# Patient Record
Sex: FEMALE | Race: WHITE | Employment: OTHER | URBAN - METROPOLITAN AREA
[De-identification: names, ages, dates, MRNs, and addresses within clinical notes are randomized per-mention and may not be internally consistent; named-entity substitution may affect disease eponyms.]

---

## 2017-05-03 ENCOUNTER — HOSPITAL ENCOUNTER (OUTPATIENT)
Dept: PHYSICAL THERAPY | Age: 67
Discharge: HOME | End: 2017-05-31
Attending: INTERNAL MEDICINE | Admitting: INTERNAL MEDICINE

## 2017-06-01 ENCOUNTER — HOSPITAL ENCOUNTER (OUTPATIENT)
Dept: OTHER | Age: 67
Discharge: HOME | End: 2017-06-30
Attending: INTERNAL MEDICINE | Admitting: INTERNAL MEDICINE

## 2017-10-03 NOTE — ANESTHESIA PRE-OP
Department of Anesthesiology  Preprocedure Note       Name:  Yang Marie   Age:  78 y.o.  :  1950                                          MRN:  [id number]         Date:  10/2/2017      Surgeon:    Procedure: colonoscopy    Medications prior to admission:   Prior to Admission medications    Medication Sig Start Date End Date Taking? Authorizing Provider   calcium-vitamin D (OSCAL-500) 500-200 MG-UNIT per tablet Take 1 tablet by mouth daily    Historical Provider, MD   methylcellulose (CITRUCEL) 500 MG TABS Take by mouth 2 times daily    Historical Provider, MD   Multiple Vitamin (MULTI VITAMIN DAILY PO) Take by mouth    Historical Provider, MD   vitamin E 400 UNIT capsule Take 400 Units by mouth daily    Historical Provider, MD   betamethasone valerate (VALISONE) 0.1 % cream APPLY THIN APPLICATION TO AFFECTED AREA BID PRN FOR DERMATITIS 17   Historical Provider, MD   hydrochlorothiazide (HYDRODIURIL) 25 MG tablet daily  16   Historical Provider, MD   nadolol (CORGARD) 80 MG tablet daily  16   Historical Provider, MD   letrozole (FEMARA) 2.5 MG tablet Take 2.5 mg by mouth daily    Historical Provider, MD   aspirin 81 MG tablet Take 81 mg by mouth daily    Historical Provider, MD   Loratadine 10 MG CAPS Take by mouth    Historical Provider, MD   traMADol (ULTRAM) 50 MG tablet Take 50 mg by mouth every 6 hours as needed for Pain.     Historical Provider, MD       Current medications:    Current Outpatient Prescriptions   Medication Sig Dispense Refill    calcium-vitamin D (OSCAL-500) 500-200 MG-UNIT per tablet Take 1 tablet by mouth daily      methylcellulose (CITRUCEL) 500 MG TABS Take by mouth 2 times daily      Multiple Vitamin (MULTI VITAMIN DAILY PO) Take by mouth      vitamin E 400 UNIT capsule Take 400 Units by mouth daily      betamethasone valerate (VALISONE) 0.1 % cream APPLY THIN APPLICATION TO AFFECTED AREA BID PRN FOR DERMATITIS  1    hydrochlorothiazide (HYDRODIURIL) 25 MG TIVA     intravenous induction       Pre Anesthesia Assessment complete. Chart reviewed on 10/2/2017. This is a chart review only. Rickey Guerra,    10/2/2017      =================  Addendum =================  Patient records were reviewed and the patient seen and evaluated. Most Recent Results:  BP (!) 173/86  Pulse 84  Temp 98 °F (36.7 °C) (Temporal)   Resp 16  Ht 5' 2\" (1.575 m)  Wt 156 lb (70.8 kg)  SpO2 98%  BMI 28.53 kg/m2    Lab Results   Component Value Date/Time     03/15/2016 09:25 AM    K 3.8 03/15/2016 09:25 AM     03/15/2016 09:25 AM    CO2 27 03/15/2016 09:25 AM    BUN 11 03/15/2016 09:25 AM    CREATININE 0.5 (L) 03/15/2016 09:25 AM     Anesthesiology focused physical examination:  MP2  RRR without murmur  Lungs clear    Assessment/Plan:  NPO status confirmed  beta-blocker - took nadolol @ 0600 this AM  Plan MAC/TIVA    PS 2    Anesthesia procedures discussed - all questions answered.   Information/plan discussed with CRNA involved in the anesthesia care team.    Gaby Landaverde MD

## 2017-10-04 ENCOUNTER — HOSPITAL ENCOUNTER (OUTPATIENT)
Dept: SURGERY | Age: 67
Discharge: HOME | End: 2017-10-04
Attending: SPECIALIST | Admitting: SPECIALIST

## 2017-10-04 VITALS
HEART RATE: 79 BPM | RESPIRATION RATE: 16 BRPM | BODY MASS INDEX: 28.71 KG/M2 | TEMPERATURE: 98.2 F | WEIGHT: 156 LBS | SYSTOLIC BLOOD PRESSURE: 156 MMHG | HEIGHT: 62 IN | DIASTOLIC BLOOD PRESSURE: 87 MMHG | OXYGEN SATURATION: 99 %

## 2017-10-04 RX ORDER — SODIUM CHLORIDE, SODIUM LACTATE, POTASSIUM CHLORIDE, CALCIUM CHLORIDE 600; 310; 30; 20 MG/100ML; MG/100ML; MG/100ML; MG/100ML
INJECTION, SOLUTION INTRAVENOUS CONTINUOUS
Status: DISCONTINUED | OUTPATIENT
Start: 2017-10-04 | End: 2017-10-05 | Stop reason: HOSPADM

## 2017-10-04 RX ADMIN — SODIUM CHLORIDE, SODIUM LACTATE, POTASSIUM CHLORIDE, CALCIUM CHLORIDE: 600; 310; 30; 20 INJECTION, SOLUTION INTRAVENOUS at 10:28

## 2017-10-04 ASSESSMENT — PAIN SCALES - GENERAL
PAINLEVEL_OUTOF10: 0
PAINLEVEL_OUTOF10: 0

## 2017-10-04 ASSESSMENT — PAIN - FUNCTIONAL ASSESSMENT: PAIN_FUNCTIONAL_ASSESSMENT: 0-10

## 2017-10-04 NOTE — IP AVS SNAPSHOT
Take 50 mg by mouth every 6 hours as needed for Pain. 10/4/17  MAY USE AS NEEDED                       vitamin E 400 UNIT capsule   Take 400 Units by mouth daily                  10/4/17        1:00 PM                       Allergies as of 10/4/2017        Reactions    Sulfa Antibiotics       Immunizations as of 10/4/2017     No immunizations on file. Last Vitals          Most Recent Value    Temp  98.2 °F (36.8 °C)    Pulse  83    Resp  16    BP  (!)  137/90         After Visit Summary    This summary was created for you. Thank you for entrusting your care to us. The following information includes details about your hospital/visit stay along with steps you should take to help with your recovery once you leave the hospital.  In this packet, you will find information about the topics listed below:    · Instructions about your medications including a list of your home medications  · A summary of your hospital visit  · Follow-up appointments once you have left the hospital  · Your care plan at home      You may receive a survey regarding the care you received during your stay. Your input is valuable to us. We encourage you to complete and return your survey in the envelope provided. We hope you will choose us in the future for your healthcare needs. Patient Information     Patient Name MEE Marroquin [facility] [place] 1950      Care Provided at:     Name Address Phone       [address] [phone]            Your Visit    Here you will find information about your visit, including the reason for your visit. Please take this sheet with you when you visit your doctor or other health care provider in the future. It will help determine the best possible medical care for you at that time. If you have any questions once you leave the hospital, please call the department phone number listed below. Why you were here     Your primary diagnosis was:  Not on File      Visit Information     Date & Time Provider Department Dept. Phone    10/4/2017 Valdo Herzog MD [facility] [phone]       Follow-up Appointments    Below is a list of your follow-up and future appointments. This may not be a complete list as you may have made appointments directly with providers that we are not aware of or your providers may have made some for you. Please call your providers to confirm appointments. It is important to keep your appointments. Please bring your current insurance card, photo ID, co-pay, and all medication bottles to your appointment. If self-pay, payment is expected at the time of service. Future Appointments     3/19/2018 3:15 PM     Appointment with Elijah Butts MD at [address] [place] [address] [facility] ([phone])   Please arrive 15 minutes prior to appointment, bring photo ID and insurance card. [address]         Preventive Care        Date Due    Hepatitis C screening is recommended for all adults regardless of risk factors born between [facility] at least once (lifetime) who have never been tested. 1950    Tetanus Combination Vaccine (1 - Tdap) 2/2/1970    Cholesterol Screening 2/2/1991    Colonoscopy 2/2/2001    Zoster Vaccine 2/2/2011    Mammograms are recommended every 2 years for low/average risk patients aged 48 - 69, and every year for high risk patients per updated national guidelines. However these guidelines can be individualized by your provider. 11/19/2015    Osteoporosis screening or a bone density scan (Dexa) is recommended once at age 72. Based upon the results and risk factors for bone loss, your provider will recommend whether this needs to be repeated.  2/2/2016    Pneumococcal Vaccines (two) for all adults aged 72 and over (1 of 2 - PCV13) 2/2/2016    Yearly Flu Vaccine (1) 9/1/2017 medical emergencies, dial 911. For questions regarding your [employer] account call [phone]. If you have a clinical question, please call your doctor's office. View your information online  ? Review your current list of  medications, immunization, and allergies. ? Review your future test results online . ? Review your discharge instructions provided by your caregivers at discharge    Certain functionality such as prescription refills, scheduling appointments or sending messages to your provider are not activated if your provider does not use [employer] in his/her office    For questions regarding your MyChart account call [phone]. If you have a clinical question, please call your doctor's office. The information on all pages of the After Visit Summary has been reviewed with me, the patient and/or responsible adult, by my health care provider(s). I had the opportunity to ask questions regarding this information. I understand I should dispose of my armband safely at home to protect my health information. A complete copy of the After Visit Summary has been given to me, the patient and/or responsible adult.            Patient Signature/Responsible Adult:____________________    Clinician Signature:_____________________    Date:_____________________    Time:_____________________

## 2017-10-04 NOTE — BRIEF OP NOTE
BRIEF COLONOSCOPY REPORT:    Impression:    1) mild sigmoid divetrtics    2) otherwise normal        Suggest:   1) repeat in 10 years       The complete operative report (including photos) is available in the following locations:   1) soft chart now   2) report will also be scanned and can then be found by going to \"chart review\" then \"notes\" then \"op report\" or by going to \"chart review\" then \"media\" then \"op report\". For review of photos, may need to go to page 2.

## 2017-10-04 NOTE — ANESTHESIA POST-OP
Anesthesia Post-op Note    Patient:    Hernandez Altamirano  MRN:     [id number]   YOB: 1950    Anesthesia Post Evaluation    Final anesthesia type:  MAC-TIVA  Location of evaluation:  PACU  Patient participation:   complete - patient participated  Level of consciousness:  awake  Pain score:    1  Airway patency:   patent  Nausea & Vomiting:   no nausea and no vomiting  Complications:  no  Cardiovascular status:  blood pressure returned to baseline  Respiratory status:   acceptable  Hydration status:   euvolemic    Gaby Landaverde MD

## 2018-04-26 ENCOUNTER — HOSPITAL ENCOUNTER (OUTPATIENT)
Dept: OTHER | Age: 68
Discharge: HOME | End: 2018-04-26
Attending: INTERNAL MEDICINE | Admitting: INTERNAL MEDICINE

## 2018-04-26 LAB
ALBUMIN SERPL-MCNC: 4.9 GM/DL (ref 3.4–5)
ALP BLD-CCNC: 74 IU/L (ref 40–128)
ALT SERPL-CCNC: 18 U/L (ref 10–40)
ANION GAP SERPL CALCULATED.3IONS-SCNC: 14 MMOL/L (ref 4–16)
AST SERPL-CCNC: 22 IU/L (ref 15–37)
BILIRUB SERPL-MCNC: 0.5 MG/DL (ref 0–1)
BUN BLDV-MCNC: 13 MG/DL (ref 6–23)
CALCIUM SERPL-MCNC: 10.7 MG/DL (ref 8.3–10.6)
CHLORIDE BLD-SCNC: 97 MMOL/L (ref 99–110)
CO2: 31 MMOL/L (ref 21–32)
CREAT SERPL-MCNC: 0.6 MG/DL (ref 0.6–1.1)
GFR AFRICAN AMERICAN: >60 ML/MIN/1.73M2
GFR NON-AFRICAN AMERICAN: >60 ML/MIN/1.73M2
GLUCOSE BLD-MCNC: 96 MG/DL (ref 70–99)
POTASSIUM SERPL-SCNC: 3.9 MMOL/L (ref 3.5–5.1)
SODIUM BLD-SCNC: 142 MMOL/L (ref 135–145)
TOTAL PROTEIN: 7.5 GM/DL (ref 6.4–8.2)

## 2018-04-28 LAB — CA 27.29: 14.1 U/ML

## 2018-05-08 ENCOUNTER — HOSPITAL ENCOUNTER (OUTPATIENT)
Dept: OTHER | Age: 68
Discharge: HOME | End: 2018-05-08
Attending: INTERNAL MEDICINE | Admitting: INTERNAL MEDICINE

## 2018-05-08 LAB
ANION GAP SERPL CALCULATED.3IONS-SCNC: 14 MMOL/L (ref 4–16)
BUN BLDV-MCNC: 12 MG/DL (ref 6–23)
CALCIUM SERPL-MCNC: 10.2 MG/DL (ref 8.3–10.6)
CHLORIDE BLD-SCNC: 100 MMOL/L (ref 99–110)
CO2: 28 MMOL/L (ref 21–32)
CREAT SERPL-MCNC: 0.6 MG/DL (ref 0.6–1.1)
GFR AFRICAN AMERICAN: >60 ML/MIN/1.73M2
GFR NON-AFRICAN AMERICAN: >60 ML/MIN/1.73M2
GLUCOSE BLD-MCNC: 94 MG/DL (ref 70–99)
POTASSIUM SERPL-SCNC: 3.8 MMOL/L (ref 3.5–5.1)
SODIUM BLD-SCNC: 142 MMOL/L (ref 135–145)

## 2018-05-09 LAB — PARATHYROID HORMONE INTACT: 19

## 2019-02-22 ENCOUNTER — HOSPITAL ENCOUNTER (OUTPATIENT)
Age: 69
Setting detail: SPECIMEN
Discharge: HOME | End: 2019-02-22
Payer: MEDICARE

## 2019-02-22 LAB
ALBUMIN SERPL-MCNC: 4.4 GM/DL (ref 3.4–5)
ALP BLD-CCNC: 204 IU/L (ref 40–129)
ALT SERPL-CCNC: 22 U/L (ref 10–40)
ANION GAP SERPL CALCULATED.3IONS-SCNC: 15 MMOL/L (ref 4–16)
AST SERPL-CCNC: 26 IU/L (ref 15–37)
BILIRUB SERPL-MCNC: 0.4 MG/DL (ref 0–1)
BUN BLDV-MCNC: 12 MG/DL (ref 6–23)
CALCIUM SERPL-MCNC: 9.2 MG/DL (ref 8.3–10.6)
CHLORIDE BLD-SCNC: 89 MMOL/L (ref 99–110)
CO2: 29 MMOL/L (ref 21–32)
CREAT SERPL-MCNC: 0.6 MG/DL (ref 0.6–1.1)
GFR AFRICAN AMERICAN: >60 ML/MIN/1.73M2
GFR NON-AFRICAN AMERICAN: >60 ML/MIN/1.73M2
GLUCOSE BLD-MCNC: 126 MG/DL (ref 70–99)
POTASSIUM SERPL-SCNC: 4.1 MMOL/L (ref 3.5–5.1)
SODIUM BLD-SCNC: 133 MMOL/L (ref 135–145)
TOTAL PROTEIN: 7.2 GM/DL (ref 6.4–8.2)

## 2019-02-22 PROCEDURE — 86300 IMMUNOASSAY TUMOR CA 15-3: CPT

## 2019-02-22 PROCEDURE — 83883 ASSAY NEPHELOMETRY NOT SPEC: CPT

## 2019-02-22 PROCEDURE — 80053 COMPREHEN METABOLIC PANEL: CPT

## 2019-02-22 PROCEDURE — 84165 PROTEIN E-PHORESIS SERUM: CPT

## 2019-02-25 LAB
ALBUMIN ELP: 3.8 GM/DL (ref 3.2–5.6)
ALPHA-1-GLOBULIN: 0.4 GM/DL (ref 0.1–0.4)
ALPHA-2-GLOBULIN: 1.1 GM/DL (ref 0.4–1.2)
BETA GLOBULIN: 1 GM/DL (ref 0.5–1.3)
CA 27.29: 37.1 U/ML
GAMMA GLOBULIN: 0.9 GM/DL (ref 0.5–1.6)
KAPPA QUANT FREE LIGHT CHAINS: 1.11
KAPPA/LAMBDA FREE LIGHT CHAIN RATIO: 1.31
LAMBDA FREE LIGHT CHAINS URINE/ VOL: 0.85
TOTAL PROTEIN: 7.2 GM/DL (ref 6.4–8.2)

## 2019-02-28 ENCOUNTER — HOSPITAL ENCOUNTER (OUTPATIENT)
Dept: PET IMAGING | Age: 69
Discharge: HOME | End: 2019-02-28
Payer: MEDICARE

## 2019-02-28 DIAGNOSIS — C50.412 MALIGNANT NEOPLASM OF UPPER-OUTER QUADRANT OF LEFT FEMALE BREAST, UNSPECIFIED ESTROGEN RECEPTOR STATUS (HCC): ICD-10-CM

## 2019-02-28 PROCEDURE — 3430000000 HC RX DIAGNOSTIC RADIOPHARMACEUTICAL: Performed by: INTERNAL MEDICINE

## 2019-02-28 PROCEDURE — 78815 PET IMAGE W/CT SKULL-THIGH: CPT

## 2019-02-28 PROCEDURE — 2580000003 HC RX 258: Performed by: INTERNAL MEDICINE

## 2019-02-28 PROCEDURE — A9552 F18 FDG: HCPCS | Performed by: INTERNAL MEDICINE

## 2019-02-28 RX ORDER — FLUDEOXYGLUCOSE F 18 200 MCI/ML
13.06 INJECTION, SOLUTION INTRAVENOUS
Status: COMPLETED | OUTPATIENT
Start: 2019-02-28 | End: 2019-02-28

## 2019-02-28 RX ORDER — SODIUM CHLORIDE 0.9 % (FLUSH) 0.9 %
10 SYRINGE (ML) INJECTION PRN
Status: DISCONTINUED | OUTPATIENT
Start: 2019-02-28 | End: 2019-03-01 | Stop reason: HOSPADM

## 2019-02-28 RX ADMIN — SODIUM CHLORIDE, PRESERVATIVE FREE 10 ML: 5 INJECTION INTRAVENOUS at 13:05

## 2019-02-28 RX ADMIN — FLUDEOXYGLUCOSE F 18 13.06 MILLICURIE: 200 INJECTION, SOLUTION INTRAVENOUS at 13:05

## 2019-03-13 RX ORDER — ASCORBIC ACID 500 MG
500 TABLET ORAL DAILY
COMMUNITY

## 2019-03-13 RX ORDER — OXYCODONE HYDROCHLORIDE 5 MG/1
5 CAPSULE ORAL EVERY 4 HOURS PRN
COMMUNITY
End: 2019-07-12 | Stop reason: ALTCHOICE

## 2019-03-13 RX ORDER — TRAMADOL HYDROCHLORIDE 50 MG/1
50 TABLET ORAL EVERY 6 HOURS PRN
COMMUNITY
End: 2019-07-12 | Stop reason: ALTCHOICE

## 2019-03-13 RX ORDER — IBUPROFEN 600 MG/1
600 TABLET ORAL EVERY 8 HOURS PRN
COMMUNITY
End: 2019-07-12 | Stop reason: ALTCHOICE

## 2019-03-13 RX ORDER — ACETAMINOPHEN 325 MG/1
650 TABLET ORAL EVERY 6 HOURS PRN
COMMUNITY
End: 2020-03-20

## 2019-03-13 RX ORDER — PREDNISONE 1 MG/1
5 TABLET ORAL DAILY
COMMUNITY
End: 2019-07-12 | Stop reason: ALTCHOICE

## 2019-03-13 RX ORDER — OMEPRAZOLE 20 MG/1
20 CAPSULE, DELAYED RELEASE ORAL DAILY
COMMUNITY

## 2019-03-14 ENCOUNTER — HOSPITAL ENCOUNTER (OUTPATIENT)
Dept: INTERVENTIONAL RADIOLOGY/VASCULAR | Age: 69
Discharge: HOME | End: 2019-03-14
Payer: MEDICARE

## 2019-03-14 ENCOUNTER — HOSPITAL ENCOUNTER (OUTPATIENT)
Dept: CT IMAGING | Age: 69
Discharge: HOME | End: 2019-03-14
Payer: MEDICARE

## 2019-03-14 VITALS
OXYGEN SATURATION: 93 % | BODY MASS INDEX: 28.89 KG/M2 | HEIGHT: 62 IN | SYSTOLIC BLOOD PRESSURE: 138 MMHG | HEART RATE: 95 BPM | DIASTOLIC BLOOD PRESSURE: 68 MMHG | RESPIRATION RATE: 15 BRPM | TEMPERATURE: 98.5 F | WEIGHT: 157 LBS

## 2019-03-14 VITALS
TEMPERATURE: 98.8 F | DIASTOLIC BLOOD PRESSURE: 71 MMHG | OXYGEN SATURATION: 95 % | RESPIRATION RATE: 16 BRPM | SYSTOLIC BLOOD PRESSURE: 148 MMHG | HEART RATE: 93 BPM

## 2019-03-14 DIAGNOSIS — M89.9 BONE LESION: ICD-10-CM

## 2019-03-14 LAB
APTT: 21.3 SECONDS (ref 21.2–33)
HCT VFR BLD CALC: 33.8 % (ref 37–47)
HEMOGLOBIN: 10.8 GM/DL (ref 12.5–16)
INR BLD: 1.19 INDEX
MCH RBC QN AUTO: 30.5 PG (ref 27–31)
MCHC RBC AUTO-ENTMCNC: 32 % (ref 32–36)
MCV RBC AUTO: 95.5 FL (ref 78–100)
PDW BLD-RTO: 14 % (ref 11.7–14.9)
PLATELET # BLD: 340 K/CU MM (ref 140–440)
PMV BLD AUTO: 9.2 FL (ref 7.5–11.1)
PROTHROMBIN TIME: 13.8 SECONDS (ref 9.12–12.5)
RBC # BLD: 3.54 M/CU MM (ref 4.2–5.4)
WBC # BLD: 10.1 K/CU MM (ref 4–10.5)

## 2019-03-14 PROCEDURE — 85730 THROMBOPLASTIN TIME PARTIAL: CPT

## 2019-03-14 PROCEDURE — 88342 IMHCHEM/IMCYTCHM 1ST ANTB: CPT

## 2019-03-14 PROCEDURE — 85027 COMPLETE CBC AUTOMATED: CPT

## 2019-03-14 PROCEDURE — 88333 PATH CONSLTJ SURG CYTO XM 1: CPT

## 2019-03-14 PROCEDURE — 77012 CT SCAN FOR NEEDLE BIOPSY: CPT

## 2019-03-14 PROCEDURE — 6370000000 HC RX 637 (ALT 250 FOR IP): Performed by: RADIOLOGY

## 2019-03-14 PROCEDURE — 88360 TUMOR IMMUNOHISTOCHEM/MANUAL: CPT

## 2019-03-14 PROCEDURE — 88307 TISSUE EXAM BY PATHOLOGIST: CPT

## 2019-03-14 PROCEDURE — 88341 IMHCHEM/IMCYTCHM EA ADD ANTB: CPT

## 2019-03-14 PROCEDURE — 7100000011 HC PHASE II RECOVERY - ADDTL 15 MIN

## 2019-03-14 PROCEDURE — 7100000010 HC PHASE II RECOVERY - FIRST 15 MIN

## 2019-03-14 PROCEDURE — 2580000003 HC RX 258: Performed by: RADIOLOGY

## 2019-03-14 PROCEDURE — 2709999900 HC NON-CHARGEABLE SUPPLY

## 2019-03-14 PROCEDURE — 85610 PROTHROMBIN TIME: CPT

## 2019-03-14 RX ORDER — SODIUM CHLORIDE 0.9 % (FLUSH) 0.9 %
10 SYRINGE (ML) INJECTION 2 TIMES DAILY
Status: DISCONTINUED | OUTPATIENT
Start: 2019-03-14 | End: 2019-03-15 | Stop reason: HOSPADM

## 2019-03-14 RX ORDER — ACETAMINOPHEN 325 MG/1
650 TABLET ORAL EVERY 4 HOURS PRN
Status: DISCONTINUED | OUTPATIENT
Start: 2019-03-14 | End: 2019-03-15 | Stop reason: HOSPADM

## 2019-03-14 RX ADMIN — ACETAMINOPHEN 650 MG: 325 TABLET, FILM COATED ORAL at 11:46

## 2019-03-14 RX ADMIN — SODIUM CHLORIDE, PRESERVATIVE FREE 10 ML: 5 INJECTION INTRAVENOUS at 08:26

## 2019-03-14 ASSESSMENT — PAIN - FUNCTIONAL ASSESSMENT: PAIN_FUNCTIONAL_ASSESSMENT: 0-10

## 2019-03-14 ASSESSMENT — PAIN DESCRIPTION - FREQUENCY
FREQUENCY: CONTINUOUS

## 2019-03-14 ASSESSMENT — PAIN DESCRIPTION - ONSET
ONSET: ON-GOING

## 2019-03-14 ASSESSMENT — PAIN DESCRIPTION - DESCRIPTORS
DESCRIPTORS: ACHING

## 2019-03-14 ASSESSMENT — PAIN DESCRIPTION - LOCATION
LOCATION: BACK

## 2019-03-14 ASSESSMENT — PAIN DESCRIPTION - PROGRESSION
CLINICAL_PROGRESSION: NOT CHANGED
CLINICAL_PROGRESSION: GRADUALLY IMPROVING
CLINICAL_PROGRESSION: GRADUALLY IMPROVING

## 2019-03-14 ASSESSMENT — PAIN DESCRIPTION - PAIN TYPE
TYPE: CHRONIC PAIN

## 2019-03-14 ASSESSMENT — PAIN DESCRIPTION - ORIENTATION
ORIENTATION: MID;LOWER

## 2019-03-14 ASSESSMENT — PAIN SCALES - GENERAL
PAINLEVEL_OUTOF10: 5
PAINLEVEL_OUTOF10: 3
PAINLEVEL_OUTOF10: 3

## 2019-03-21 ENCOUNTER — HOSPITAL ENCOUNTER (OUTPATIENT)
Age: 69
Setting detail: SPECIMEN
Discharge: HOME | End: 2019-03-21
Payer: MEDICARE

## 2019-03-21 LAB
ALBUMIN SERPL-MCNC: 4 GM/DL (ref 3.4–5)
ALP BLD-CCNC: 215 IU/L (ref 40–129)
ALT SERPL-CCNC: 24 U/L (ref 10–40)
ANION GAP SERPL CALCULATED.3IONS-SCNC: 15 MMOL/L (ref 4–16)
AST SERPL-CCNC: 32 IU/L (ref 15–37)
BILIRUB SERPL-MCNC: 0.3 MG/DL (ref 0–1)
BUN BLDV-MCNC: 10 MG/DL (ref 6–23)
CALCIUM SERPL-MCNC: 10.7 MG/DL (ref 8.3–10.6)
CHLORIDE BLD-SCNC: 93 MMOL/L (ref 99–110)
CO2: 30 MMOL/L (ref 21–32)
CREAT SERPL-MCNC: 0.6 MG/DL (ref 0.6–1.1)
GFR AFRICAN AMERICAN: >60 ML/MIN/1.73M2
GFR NON-AFRICAN AMERICAN: >60 ML/MIN/1.73M2
GLUCOSE BLD-MCNC: 133 MG/DL (ref 70–99)
POTASSIUM SERPL-SCNC: 3.6 MMOL/L (ref 3.5–5.1)
SODIUM BLD-SCNC: 138 MMOL/L (ref 135–145)
TOTAL PROTEIN: 6.9 GM/DL (ref 6.4–8.2)

## 2019-03-21 PROCEDURE — 80053 COMPREHEN METABOLIC PANEL: CPT

## 2019-03-25 ENCOUNTER — HOSPITAL ENCOUNTER (OUTPATIENT)
Age: 69
Setting detail: SPECIMEN
Discharge: HOME | End: 2019-03-25
Payer: MEDICARE

## 2019-03-25 LAB
ALBUMIN SERPL-MCNC: 3.9 GM/DL (ref 3.4–5)
ALP BLD-CCNC: 202 IU/L (ref 40–128)
ALT SERPL-CCNC: 23 U/L (ref 10–40)
ANION GAP SERPL CALCULATED.3IONS-SCNC: 13 MMOL/L (ref 4–16)
AST SERPL-CCNC: 32 IU/L (ref 15–37)
BILIRUB SERPL-MCNC: 0.3 MG/DL (ref 0–1)
BUN BLDV-MCNC: 12 MG/DL (ref 6–23)
CALCIUM SERPL-MCNC: 11.3 MG/DL (ref 8.3–10.6)
CHLORIDE BLD-SCNC: 94 MMOL/L (ref 99–110)
CO2: 32 MMOL/L (ref 21–32)
CREAT SERPL-MCNC: 0.7 MG/DL (ref 0.6–1.1)
GFR AFRICAN AMERICAN: >60 ML/MIN/1.73M2
GFR NON-AFRICAN AMERICAN: >60 ML/MIN/1.73M2
GLUCOSE BLD-MCNC: 102 MG/DL (ref 70–99)
POTASSIUM SERPL-SCNC: 3.3 MMOL/L (ref 3.5–5.1)
SODIUM BLD-SCNC: 139 MMOL/L (ref 135–145)
TOTAL PROTEIN: 6.6 GM/DL (ref 6.4–8.2)

## 2019-03-25 PROCEDURE — 80053 COMPREHEN METABOLIC PANEL: CPT

## 2019-04-08 ENCOUNTER — HOSPITAL ENCOUNTER (OUTPATIENT)
Age: 69
Setting detail: SPECIMEN
Discharge: HOME | End: 2019-04-08
Payer: MEDICARE

## 2019-04-08 LAB
ALBUMIN SERPL-MCNC: 3.7 GM/DL (ref 3.4–5)
ALP BLD-CCNC: 160 IU/L (ref 40–129)
ALT SERPL-CCNC: 23 U/L (ref 10–40)
ANION GAP SERPL CALCULATED.3IONS-SCNC: 13 MMOL/L (ref 4–16)
AST SERPL-CCNC: 41 IU/L (ref 15–37)
BILIRUB SERPL-MCNC: 0.2 MG/DL (ref 0–1)
BUN BLDV-MCNC: 12 MG/DL (ref 6–23)
CALCIUM SERPL-MCNC: 8.9 MG/DL (ref 8.3–10.6)
CHLORIDE BLD-SCNC: 95 MMOL/L (ref 99–110)
CO2: 29 MMOL/L (ref 21–32)
CREAT SERPL-MCNC: 0.6 MG/DL (ref 0.6–1.1)
GFR AFRICAN AMERICAN: >60 ML/MIN/1.73M2
GFR NON-AFRICAN AMERICAN: >60 ML/MIN/1.73M2
GLUCOSE BLD-MCNC: 97 MG/DL (ref 70–99)
POTASSIUM SERPL-SCNC: 3.4 MMOL/L (ref 3.5–5.1)
SODIUM BLD-SCNC: 137 MMOL/L (ref 135–145)
TOTAL PROTEIN: 6.2 GM/DL (ref 6.4–8.2)

## 2019-04-08 PROCEDURE — 80053 COMPREHEN METABOLIC PANEL: CPT

## 2019-04-22 ENCOUNTER — HOSPITAL ENCOUNTER (OUTPATIENT)
Age: 69
Setting detail: SPECIMEN
Discharge: HOME | End: 2019-04-22
Payer: MEDICARE

## 2019-04-22 LAB
ALBUMIN SERPL-MCNC: 3.8 GM/DL (ref 3.4–5)
ALP BLD-CCNC: 241 IU/L (ref 40–129)
ALT SERPL-CCNC: 29 U/L (ref 10–40)
ANION GAP SERPL CALCULATED.3IONS-SCNC: 11 MMOL/L (ref 4–16)
AST SERPL-CCNC: 29 IU/L (ref 15–37)
BILIRUB SERPL-MCNC: 0.2 MG/DL (ref 0–1)
BUN BLDV-MCNC: 22 MG/DL (ref 6–23)
CALCIUM SERPL-MCNC: 9.2 MG/DL (ref 8.3–10.6)
CHLORIDE BLD-SCNC: 94 MMOL/L (ref 99–110)
CO2: 29 MMOL/L (ref 21–32)
CREAT SERPL-MCNC: 0.7 MG/DL (ref 0.6–1.1)
GFR AFRICAN AMERICAN: >60 ML/MIN/1.73M2
GFR NON-AFRICAN AMERICAN: >60 ML/MIN/1.73M2
GLUCOSE BLD-MCNC: 106 MG/DL (ref 70–99)
POTASSIUM SERPL-SCNC: 3.7 MMOL/L (ref 3.5–5.1)
SODIUM BLD-SCNC: 134 MMOL/L (ref 135–145)
TOTAL PROTEIN: 6.4 GM/DL (ref 6.4–8.2)

## 2019-04-22 PROCEDURE — 80053 COMPREHEN METABOLIC PANEL: CPT

## 2019-04-29 ENCOUNTER — HOSPITAL ENCOUNTER (OUTPATIENT)
Age: 69
Setting detail: SPECIMEN
Discharge: HOME | End: 2019-04-29
Payer: MEDICARE

## 2019-04-29 LAB
ALBUMIN SERPL-MCNC: 3.9 GM/DL (ref 3.4–5)
ALP BLD-CCNC: 183 IU/L (ref 40–128)
ALT SERPL-CCNC: 24 U/L (ref 10–40)
ANION GAP SERPL CALCULATED.3IONS-SCNC: 14 MMOL/L (ref 4–16)
AST SERPL-CCNC: 34 IU/L (ref 15–37)
BILIRUB SERPL-MCNC: 0.3 MG/DL (ref 0–1)
BUN BLDV-MCNC: 17 MG/DL (ref 6–23)
CALCIUM SERPL-MCNC: 9.5 MG/DL (ref 8.3–10.6)
CHLORIDE BLD-SCNC: 99 MMOL/L (ref 99–110)
CO2: 27 MMOL/L (ref 21–32)
CREAT SERPL-MCNC: 0.6 MG/DL (ref 0.6–1.1)
GFR AFRICAN AMERICAN: >60 ML/MIN/1.73M2
GFR NON-AFRICAN AMERICAN: >60 ML/MIN/1.73M2
GLUCOSE BLD-MCNC: 92 MG/DL (ref 70–99)
POTASSIUM SERPL-SCNC: 3.6 MMOL/L (ref 3.5–5.1)
SODIUM BLD-SCNC: 140 MMOL/L (ref 135–145)
TOTAL PROTEIN: 6 GM/DL (ref 6.4–8.2)

## 2019-04-29 PROCEDURE — 80053 COMPREHEN METABOLIC PANEL: CPT

## 2019-05-06 ENCOUNTER — HOSPITAL ENCOUNTER (OUTPATIENT)
Age: 69
Setting detail: SPECIMEN
Discharge: HOME | End: 2019-05-06
Payer: MEDICARE

## 2019-05-06 LAB
ALBUMIN SERPL-MCNC: 4 GM/DL (ref 3.4–5)
ALP BLD-CCNC: 203 IU/L (ref 40–128)
ALT SERPL-CCNC: 24 U/L (ref 10–40)
ANION GAP SERPL CALCULATED.3IONS-SCNC: 16 MMOL/L (ref 4–16)
AST SERPL-CCNC: 29 IU/L (ref 15–37)
BILIRUB SERPL-MCNC: 0.3 MG/DL (ref 0–1)
BUN BLDV-MCNC: 17 MG/DL (ref 6–23)
CALCIUM SERPL-MCNC: 9.7 MG/DL (ref 8.3–10.6)
CHLORIDE BLD-SCNC: 92 MMOL/L (ref 99–110)
CO2: 30 MMOL/L (ref 21–32)
CREAT SERPL-MCNC: 0.6 MG/DL (ref 0.6–1.1)
GFR AFRICAN AMERICAN: >60 ML/MIN/1.73M2
GFR NON-AFRICAN AMERICAN: >60 ML/MIN/1.73M2
GLUCOSE BLD-MCNC: 92 MG/DL (ref 70–99)
POTASSIUM SERPL-SCNC: 3.1 MMOL/L (ref 3.5–5.1)
SODIUM BLD-SCNC: 138 MMOL/L (ref 135–145)
TOTAL PROTEIN: 6.1 GM/DL (ref 6.4–8.2)

## 2019-05-06 PROCEDURE — 80053 COMPREHEN METABOLIC PANEL: CPT

## 2019-05-20 ENCOUNTER — HOSPITAL ENCOUNTER (OUTPATIENT)
Age: 69
Setting detail: SPECIMEN
Discharge: HOME | End: 2019-05-20
Payer: MEDICARE

## 2019-05-20 PROCEDURE — 86300 IMMUNOASSAY TUMOR CA 15-3: CPT

## 2019-05-22 ENCOUNTER — HOSPITAL ENCOUNTER (OUTPATIENT)
Age: 69
Setting detail: SPECIMEN
Discharge: HOME | End: 2019-05-22
Payer: MEDICARE

## 2019-05-22 LAB
BACTERIA: NEGATIVE /HPF
BILIRUBIN URINE: NEGATIVE MG/DL
BLOOD, URINE: NEGATIVE
CA 27.29: 166.9 U/ML (ref 0–40)
CA 27.29: ABNORMAL U/ML (ref 0–40)
CLARITY: CLEAR
COLOR: ABNORMAL
GLUCOSE, URINE: NEGATIVE MG/DL
KETONES, URINE: NEGATIVE MG/DL
LEUKOCYTE ESTERASE, URINE: NEGATIVE
MUCUS: ABNORMAL HPF
NITRITE URINE, QUANTITATIVE: NEGATIVE
PH, URINE: 7 (ref 5–8)
PROTEIN UA: NEGATIVE MG/DL
RBC URINE: ABNORMAL /HPF (ref 0–6)
SPECIFIC GRAVITY UA: 1 (ref 1–1.03)
SQUAMOUS EPITHELIAL: <1 /HPF
TRICHOMONAS: ABNORMAL /HPF
UROBILINOGEN, URINE: NORMAL MG/DL (ref 0.2–1)
WBC UA: 1 /HPF (ref 0–5)

## 2019-05-22 PROCEDURE — 87086 URINE CULTURE/COLONY COUNT: CPT

## 2019-05-22 PROCEDURE — 81001 URINALYSIS AUTO W/SCOPE: CPT

## 2019-05-24 LAB
CULTURE: ABNORMAL
Lab: ABNORMAL
SPECIMEN: ABNORMAL
TOTAL COLONY COUNT: ABNORMAL

## 2019-06-12 ENCOUNTER — HOSPITAL ENCOUNTER (OUTPATIENT)
Age: 69
Setting detail: SPECIMEN
Discharge: HOME | End: 2019-06-12
Payer: MEDICARE

## 2019-06-12 LAB
ALBUMIN SERPL-MCNC: 3.3 GM/DL (ref 3.4–5)
ALP BLD-CCNC: 330 IU/L (ref 40–128)
ALT SERPL-CCNC: 51 U/L (ref 10–40)
ANION GAP SERPL CALCULATED.3IONS-SCNC: 14 MMOL/L (ref 4–16)
AST SERPL-CCNC: 32 IU/L (ref 15–37)
BILIRUB SERPL-MCNC: 0.3 MG/DL (ref 0–1)
BUN BLDV-MCNC: 19 MG/DL (ref 6–23)
CALCIUM SERPL-MCNC: 8.7 MG/DL (ref 8.3–10.6)
CHLORIDE BLD-SCNC: 94 MMOL/L (ref 99–110)
CO2: 23 MMOL/L (ref 21–32)
CREAT SERPL-MCNC: 0.4 MG/DL (ref 0.6–1.1)
GFR AFRICAN AMERICAN: >60 ML/MIN/1.73M2
GFR NON-AFRICAN AMERICAN: >60 ML/MIN/1.73M2
GLUCOSE BLD-MCNC: 146 MG/DL (ref 70–99)
POTASSIUM SERPL-SCNC: 4.2 MMOL/L (ref 3.5–5.1)
SODIUM BLD-SCNC: 131 MMOL/L (ref 135–145)
TOTAL PROTEIN: 6 GM/DL (ref 6.4–8.2)

## 2019-06-12 PROCEDURE — 80053 COMPREHEN METABOLIC PANEL: CPT

## 2019-07-11 ENCOUNTER — HOSPITAL ENCOUNTER (OUTPATIENT)
Age: 69
Setting detail: SPECIMEN
Discharge: HOME | End: 2019-07-11
Payer: MEDICARE

## 2019-07-11 LAB
ALBUMIN SERPL-MCNC: 3.1 GM/DL (ref 3.4–5)
ALP BLD-CCNC: 229 IU/L (ref 40–128)
ALT SERPL-CCNC: 41 U/L (ref 10–40)
ANION GAP SERPL CALCULATED.3IONS-SCNC: 12 MMOL/L (ref 4–16)
AST SERPL-CCNC: 39 IU/L (ref 15–37)
BILIRUB SERPL-MCNC: 0.2 MG/DL (ref 0–1)
BUN BLDV-MCNC: 15 MG/DL (ref 6–23)
CALCIUM SERPL-MCNC: 7.4 MG/DL (ref 8.3–10.6)
CHLORIDE BLD-SCNC: 97 MMOL/L (ref 99–110)
CO2: 28 MMOL/L (ref 21–32)
CREAT SERPL-MCNC: 0.4 MG/DL (ref 0.6–1.1)
GFR AFRICAN AMERICAN: >60 ML/MIN/1.73M2
GFR NON-AFRICAN AMERICAN: >60 ML/MIN/1.73M2
GLUCOSE BLD-MCNC: 104 MG/DL (ref 70–99)
POTASSIUM SERPL-SCNC: ABNORMAL MMOL/L (ref 3.5–5.1)
SODIUM BLD-SCNC: 137 MMOL/L (ref 135–145)
TOTAL PROTEIN: 4.8 GM/DL (ref 6.4–8.2)

## 2019-07-11 PROCEDURE — 86850 RBC ANTIBODY SCREEN: CPT

## 2019-07-11 PROCEDURE — 86300 IMMUNOASSAY TUMOR CA 15-3: CPT

## 2019-07-11 PROCEDURE — P9016 RBC LEUKOCYTES REDUCED: HCPCS

## 2019-07-11 PROCEDURE — 86922 COMPATIBILITY TEST ANTIGLOB: CPT

## 2019-07-11 PROCEDURE — 80053 COMPREHEN METABOLIC PANEL: CPT

## 2019-07-11 PROCEDURE — 86900 BLOOD TYPING SEROLOGIC ABO: CPT

## 2019-07-11 PROCEDURE — 86901 BLOOD TYPING SEROLOGIC RH(D): CPT

## 2019-07-12 ENCOUNTER — HOSPITAL ENCOUNTER (OUTPATIENT)
Dept: INFUSION THERAPY | Age: 69
Setting detail: INFUSION SERIES
Discharge: HOME | End: 2019-07-12
Payer: MEDICARE

## 2019-07-12 VITALS
HEART RATE: 87 BPM | TEMPERATURE: 98.5 F | RESPIRATION RATE: 14 BRPM | DIASTOLIC BLOOD PRESSURE: 70 MMHG | OXYGEN SATURATION: 98 % | SYSTOLIC BLOOD PRESSURE: 110 MMHG

## 2019-07-12 PROCEDURE — P9034 PLATELETS, PHERESIS: HCPCS

## 2019-07-12 PROCEDURE — 6370000000 HC RX 637 (ALT 250 FOR IP): Performed by: INTERNAL MEDICINE

## 2019-07-12 PROCEDURE — 99211 OFF/OP EST MAY X REQ PHY/QHP: CPT

## 2019-07-12 PROCEDURE — 2580000003 HC RX 258: Performed by: INTERNAL MEDICINE

## 2019-07-12 PROCEDURE — 96374 THER/PROPH/DIAG INJ IV PUSH: CPT

## 2019-07-12 PROCEDURE — 6360000002 HC RX W HCPCS: Performed by: INTERNAL MEDICINE

## 2019-07-12 PROCEDURE — 36430 TRANSFUSION BLD/BLD COMPNT: CPT

## 2019-07-12 PROCEDURE — 2580000003 HC RX 258

## 2019-07-12 PROCEDURE — P9016 RBC LEUKOCYTES REDUCED: HCPCS

## 2019-07-12 RX ORDER — DIPHENHYDRAMINE HCL 25 MG
25 TABLET ORAL SEE ADMIN INSTRUCTIONS
Status: COMPLETED | OUTPATIENT
Start: 2019-07-12 | End: 2019-07-12

## 2019-07-12 RX ORDER — HEPARIN SODIUM (PORCINE) LOCK FLUSH IV SOLN 100 UNIT/ML 100 UNIT/ML
500 SOLUTION INTRAVENOUS PRN
Status: DISCONTINUED | OUTPATIENT
Start: 2019-07-12 | End: 2019-07-13 | Stop reason: HOSPADM

## 2019-07-12 RX ORDER — OLANZAPINE 2.5 MG/1
2.5 TABLET ORAL
COMMUNITY
End: 2019-10-17

## 2019-07-12 RX ORDER — SODIUM CHLORIDE 0.9 % (FLUSH) 0.9 %
10 SYRINGE (ML) INJECTION PRN
Status: DISCONTINUED | OUTPATIENT
Start: 2019-07-12 | End: 2019-07-13 | Stop reason: HOSPADM

## 2019-07-12 RX ORDER — 0.9 % SODIUM CHLORIDE 0.9 %
250 INTRAVENOUS SOLUTION INTRAVENOUS ONCE
Status: COMPLETED | OUTPATIENT
Start: 2019-07-12 | End: 2019-07-12

## 2019-07-12 RX ORDER — FUROSEMIDE 20 MG/1
20 TABLET ORAL DAILY
Status: ON HOLD | COMMUNITY
End: 2019-11-08 | Stop reason: SDUPTHER

## 2019-07-12 RX ORDER — POTASSIUM CHLORIDE 29.8 MG/ML
20 INJECTION INTRAVENOUS ONCE
Status: COMPLETED | OUTPATIENT
Start: 2019-07-12 | End: 2019-07-12

## 2019-07-12 RX ORDER — ONDANSETRON HYDROCHLORIDE 8 MG/1
8 TABLET, FILM COATED ORAL EVERY 8 HOURS PRN
COMMUNITY

## 2019-07-12 RX ORDER — FUROSEMIDE 10 MG/ML
20 INJECTION INTRAMUSCULAR; INTRAVENOUS SEE ADMIN INSTRUCTIONS
Status: DISCONTINUED | OUTPATIENT
Start: 2019-07-12 | End: 2019-07-13 | Stop reason: HOSPADM

## 2019-07-12 RX ORDER — METHYLPREDNISOLONE SODIUM SUCCINATE 40 MG/ML
20 INJECTION, POWDER, LYOPHILIZED, FOR SOLUTION INTRAMUSCULAR; INTRAVENOUS ONCE
Status: COMPLETED | OUTPATIENT
Start: 2019-07-12 | End: 2019-07-12

## 2019-07-12 RX ORDER — MORPHINE SULFATE 30 MG/1
30 CAPSULE, EXTENDED RELEASE ORAL 2 TIMES DAILY
Status: ON HOLD | COMMUNITY
End: 2019-10-02 | Stop reason: SDUPTHER

## 2019-07-12 RX ORDER — ACETAMINOPHEN 325 MG/1
650 TABLET ORAL SEE ADMIN INSTRUCTIONS
Status: COMPLETED | OUTPATIENT
Start: 2019-07-12 | End: 2019-07-12

## 2019-07-12 RX ADMIN — SODIUM CHLORIDE, PRESERVATIVE FREE 500 UNITS: 5 INJECTION INTRAVENOUS at 11:51

## 2019-07-12 RX ADMIN — POTASSIUM CHLORIDE 20 MEQ: 400 INJECTION, SOLUTION INTRAVENOUS at 10:42

## 2019-07-12 RX ADMIN — METHYLPREDNISOLONE SODIUM SUCCINATE 20 MG: 40 INJECTION, POWDER, LYOPHILIZED, FOR SOLUTION INTRAMUSCULAR; INTRAVENOUS at 08:06

## 2019-07-12 RX ADMIN — ACETAMINOPHEN 650 MG: 325 TABLET ORAL at 08:05

## 2019-07-12 RX ADMIN — WATER 10 ML: 1 INJECTION INTRAMUSCULAR; INTRAVENOUS; SUBCUTANEOUS at 08:00

## 2019-07-12 RX ADMIN — Medication 20 ML: at 11:51

## 2019-07-12 RX ADMIN — DIPHENHYDRAMINE HCL 25 MG: 25 TABLET ORAL at 08:05

## 2019-07-12 RX ADMIN — SODIUM CHLORIDE 250 ML: 9 INJECTION, SOLUTION INTRAVENOUS at 08:05

## 2019-07-12 ASSESSMENT — PAIN SCALES - GENERAL: PAINLEVEL_OUTOF10: 0

## 2019-07-13 LAB
ABO/RH: NORMAL
ANTIBODY SCREEN: NEGATIVE
CA 27.29: 126.8 U/ML (ref 0–40)
CA 27.29: ABNORMAL U/ML (ref 0–40)
COMPONENT: NORMAL
CROSSMATCH RESULT: NORMAL
STATUS: NORMAL
TRANSFUSION STATUS: NORMAL
UNIT DIVISION: 0
UNIT NUMBER: NORMAL

## 2019-07-17 ENCOUNTER — HOSPITAL ENCOUNTER (OUTPATIENT)
Age: 69
Setting detail: SPECIMEN
Discharge: HOME | End: 2019-07-17
Payer: MEDICARE

## 2019-07-17 LAB
ALBUMIN SERPL-MCNC: 3.1 GM/DL (ref 3.4–5)
ALP BLD-CCNC: 226 IU/L (ref 40–129)
ALT SERPL-CCNC: 38 U/L (ref 10–40)
ANION GAP SERPL CALCULATED.3IONS-SCNC: 10 MMOL/L (ref 4–16)
AST SERPL-CCNC: 35 IU/L (ref 15–37)
BILIRUB SERPL-MCNC: 0.2 MG/DL (ref 0–1)
BUN BLDV-MCNC: 20 MG/DL (ref 6–23)
CALCIUM SERPL-MCNC: 8.2 MG/DL (ref 8.3–10.6)
CHLORIDE BLD-SCNC: 100 MMOL/L (ref 99–110)
CO2: 27 MMOL/L (ref 21–32)
CREAT SERPL-MCNC: 0.4 MG/DL (ref 0.6–1.1)
GFR AFRICAN AMERICAN: >60 ML/MIN/1.73M2
GFR NON-AFRICAN AMERICAN: >60 ML/MIN/1.73M2
GLUCOSE BLD-MCNC: 130 MG/DL (ref 70–99)
POTASSIUM SERPL-SCNC: 3.9 MMOL/L (ref 3.5–5.1)
SODIUM BLD-SCNC: 137 MMOL/L (ref 135–145)
TOTAL PROTEIN: 5 GM/DL (ref 6.4–8.2)

## 2019-07-17 PROCEDURE — 80053 COMPREHEN METABOLIC PANEL: CPT

## 2019-08-01 ENCOUNTER — HOSPITAL ENCOUNTER (OUTPATIENT)
Age: 69
Setting detail: SPECIMEN
Discharge: HOME | End: 2019-08-01
Payer: MEDICARE

## 2019-08-01 LAB
ALBUMIN SERPL-MCNC: 3.1 GM/DL (ref 3.4–5)
ALP BLD-CCNC: 194 IU/L (ref 40–129)
ALT SERPL-CCNC: 37 U/L (ref 10–40)
ANION GAP SERPL CALCULATED.3IONS-SCNC: 12 MMOL/L (ref 4–16)
AST SERPL-CCNC: 32 IU/L (ref 15–37)
BILIRUB SERPL-MCNC: 0.2 MG/DL (ref 0–1)
BUN BLDV-MCNC: 18 MG/DL (ref 6–23)
CALCIUM SERPL-MCNC: 8.3 MG/DL (ref 8.3–10.6)
CHLORIDE BLD-SCNC: 98 MMOL/L (ref 99–110)
CO2: 29 MMOL/L (ref 21–32)
CREAT SERPL-MCNC: 0.5 MG/DL (ref 0.6–1.1)
GFR AFRICAN AMERICAN: >60 ML/MIN/1.73M2
GFR NON-AFRICAN AMERICAN: >60 ML/MIN/1.73M2
GLUCOSE BLD-MCNC: 99 MG/DL (ref 70–99)
POTASSIUM SERPL-SCNC: 3.1 MMOL/L (ref 3.5–5.1)
SODIUM BLD-SCNC: 139 MMOL/L (ref 135–145)
TOTAL PROTEIN: 5 GM/DL (ref 6.4–8.2)

## 2019-08-01 PROCEDURE — 86850 RBC ANTIBODY SCREEN: CPT

## 2019-08-01 PROCEDURE — P9016 RBC LEUKOCYTES REDUCED: HCPCS

## 2019-08-01 PROCEDURE — 86901 BLOOD TYPING SEROLOGIC RH(D): CPT

## 2019-08-01 PROCEDURE — 80053 COMPREHEN METABOLIC PANEL: CPT

## 2019-08-01 PROCEDURE — 86900 BLOOD TYPING SEROLOGIC ABO: CPT

## 2019-08-01 PROCEDURE — 36430 TRANSFUSION BLD/BLD COMPNT: CPT

## 2019-08-01 PROCEDURE — 86922 COMPATIBILITY TEST ANTIGLOB: CPT

## 2019-08-02 ENCOUNTER — HOSPITAL ENCOUNTER (OUTPATIENT)
Dept: INFUSION THERAPY | Age: 69
Setting detail: INFUSION SERIES
Discharge: HOME | End: 2019-08-02
Payer: MEDICARE

## 2019-08-02 VITALS
TEMPERATURE: 98 F | RESPIRATION RATE: 14 BRPM | DIASTOLIC BLOOD PRESSURE: 69 MMHG | SYSTOLIC BLOOD PRESSURE: 102 MMHG | OXYGEN SATURATION: 99 % | HEART RATE: 97 BPM

## 2019-08-02 PROCEDURE — 36430 TRANSFUSION BLD/BLD COMPNT: CPT

## 2019-08-02 PROCEDURE — 6360000002 HC RX W HCPCS: Performed by: INTERNAL MEDICINE

## 2019-08-02 PROCEDURE — 99211 OFF/OP EST MAY X REQ PHY/QHP: CPT

## 2019-08-02 PROCEDURE — 96374 THER/PROPH/DIAG INJ IV PUSH: CPT

## 2019-08-02 PROCEDURE — P9016 RBC LEUKOCYTES REDUCED: HCPCS

## 2019-08-02 PROCEDURE — 2580000003 HC RX 258: Performed by: INTERNAL MEDICINE

## 2019-08-02 PROCEDURE — 6370000000 HC RX 637 (ALT 250 FOR IP): Performed by: INTERNAL MEDICINE

## 2019-08-02 RX ORDER — SODIUM CHLORIDE 0.9 % (FLUSH) 0.9 %
10 SYRINGE (ML) INJECTION PRN
Status: DISCONTINUED | OUTPATIENT
Start: 2019-08-02 | End: 2019-08-03 | Stop reason: HOSPADM

## 2019-08-02 RX ORDER — FUROSEMIDE 10 MG/ML
20 INJECTION INTRAMUSCULAR; INTRAVENOUS SEE ADMIN INSTRUCTIONS
Status: DISCONTINUED | OUTPATIENT
Start: 2019-08-02 | End: 2019-08-03 | Stop reason: HOSPADM

## 2019-08-02 RX ORDER — HEPARIN SODIUM (PORCINE) LOCK FLUSH IV SOLN 100 UNIT/ML 100 UNIT/ML
500 SOLUTION INTRAVENOUS PRN
Status: DISCONTINUED | OUTPATIENT
Start: 2019-08-02 | End: 2019-08-03 | Stop reason: HOSPADM

## 2019-08-02 RX ORDER — METHYLPREDNISOLONE SODIUM SUCCINATE 40 MG/ML
20 INJECTION, POWDER, LYOPHILIZED, FOR SOLUTION INTRAMUSCULAR; INTRAVENOUS ONCE
Status: COMPLETED | OUTPATIENT
Start: 2019-08-02 | End: 2019-08-02

## 2019-08-02 RX ORDER — POTASSIUM CHLORIDE 1.5 G/1.77G
20 POWDER, FOR SOLUTION ORAL 2 TIMES DAILY
Status: ON HOLD | COMMUNITY
End: 2019-11-04

## 2019-08-02 RX ORDER — DIPHENHYDRAMINE HCL 25 MG
25 TABLET ORAL ONCE
Status: COMPLETED | OUTPATIENT
Start: 2019-08-02 | End: 2019-08-02

## 2019-08-02 RX ORDER — ACETAMINOPHEN 325 MG/1
650 TABLET ORAL SEE ADMIN INSTRUCTIONS
Status: COMPLETED | OUTPATIENT
Start: 2019-08-02 | End: 2019-08-02

## 2019-08-02 RX ORDER — DIPHENHYDRAMINE HCL 25 MG
TABLET ORAL
Status: DISPENSED
Start: 2019-08-02 | End: 2019-08-02

## 2019-08-02 RX ORDER — 0.9 % SODIUM CHLORIDE 0.9 %
250 INTRAVENOUS SOLUTION INTRAVENOUS ONCE
Status: COMPLETED | OUTPATIENT
Start: 2019-08-02 | End: 2019-08-02

## 2019-08-02 RX ADMIN — METHYLPREDNISOLONE SODIUM SUCCINATE 20 MG: 40 INJECTION, POWDER, FOR SOLUTION INTRAMUSCULAR; INTRAVENOUS at 11:24

## 2019-08-02 RX ADMIN — ACETAMINOPHEN 650 MG: 325 TABLET ORAL at 11:24

## 2019-08-02 RX ADMIN — SODIUM CHLORIDE 250 ML: 9 INJECTION, SOLUTION INTRAVENOUS at 11:25

## 2019-08-02 RX ADMIN — SODIUM CHLORIDE, PRESERVATIVE FREE 500 UNITS: 5 INJECTION INTRAVENOUS at 14:12

## 2019-08-02 RX ADMIN — DIPHENHYDRAMINE HCL 25 MG: 25 TABLET ORAL at 11:27

## 2019-08-02 RX ADMIN — SODIUM CHLORIDE, PRESERVATIVE FREE 10 ML: 5 INJECTION INTRAVENOUS at 14:12

## 2019-08-02 ASSESSMENT — PAIN SCALES - GENERAL
PAINLEVEL_OUTOF10: 3
PAINLEVEL_OUTOF10: 3

## 2019-08-02 ASSESSMENT — PAIN DESCRIPTION - LOCATION: LOCATION: BACK

## 2019-08-02 ASSESSMENT — PAIN DESCRIPTION - PAIN TYPE: TYPE: CHRONIC PAIN

## 2019-08-03 LAB
ABO/RH: NORMAL
ANTIBODY SCREEN: NEGATIVE
COMPONENT: NORMAL
CROSSMATCH RESULT: NORMAL
STATUS: NORMAL
TRANSFUSION STATUS: NORMAL
UNIT DIVISION: 0
UNIT NUMBER: NORMAL

## 2019-08-15 ENCOUNTER — HOSPITAL ENCOUNTER (OUTPATIENT)
Age: 69
Setting detail: SPECIMEN
Discharge: HOME | End: 2019-08-15
Payer: MEDICARE

## 2019-08-15 PROCEDURE — P9016 RBC LEUKOCYTES REDUCED: HCPCS

## 2019-08-15 PROCEDURE — 86850 RBC ANTIBODY SCREEN: CPT

## 2019-08-15 PROCEDURE — 86922 COMPATIBILITY TEST ANTIGLOB: CPT

## 2019-08-15 PROCEDURE — 86901 BLOOD TYPING SEROLOGIC RH(D): CPT

## 2019-08-15 PROCEDURE — 86900 BLOOD TYPING SEROLOGIC ABO: CPT

## 2019-08-16 ENCOUNTER — HOSPITAL ENCOUNTER (OUTPATIENT)
Dept: INFUSION THERAPY | Age: 69
Setting detail: INFUSION SERIES
Discharge: HOME | End: 2019-08-16
Payer: MEDICARE

## 2019-08-16 VITALS
SYSTOLIC BLOOD PRESSURE: 133 MMHG | DIASTOLIC BLOOD PRESSURE: 83 MMHG | OXYGEN SATURATION: 97 % | HEART RATE: 88 BPM | TEMPERATURE: 97.9 F | RESPIRATION RATE: 16 BRPM

## 2019-08-16 PROCEDURE — 6360000002 HC RX W HCPCS: Performed by: INTERNAL MEDICINE

## 2019-08-16 PROCEDURE — 96360 HYDRATION IV INFUSION INIT: CPT

## 2019-08-16 PROCEDURE — 36430 TRANSFUSION BLD/BLD COMPNT: CPT

## 2019-08-16 PROCEDURE — 6370000000 HC RX 637 (ALT 250 FOR IP): Performed by: INTERNAL MEDICINE

## 2019-08-16 PROCEDURE — 2580000003 HC RX 258: Performed by: INTERNAL MEDICINE

## 2019-08-16 PROCEDURE — 99211 OFF/OP EST MAY X REQ PHY/QHP: CPT

## 2019-08-16 PROCEDURE — P9016 RBC LEUKOCYTES REDUCED: HCPCS

## 2019-08-16 PROCEDURE — 96375 TX/PRO/DX INJ NEW DRUG ADDON: CPT

## 2019-08-16 PROCEDURE — 96374 THER/PROPH/DIAG INJ IV PUSH: CPT

## 2019-08-16 RX ORDER — FUROSEMIDE 10 MG/ML
20 INJECTION INTRAMUSCULAR; INTRAVENOUS SEE ADMIN INSTRUCTIONS
Status: COMPLETED | OUTPATIENT
Start: 2019-08-16 | End: 2019-08-16

## 2019-08-16 RX ORDER — ACETAMINOPHEN 325 MG/1
650 TABLET ORAL SEE ADMIN INSTRUCTIONS
Status: COMPLETED | OUTPATIENT
Start: 2019-08-16 | End: 2019-08-16

## 2019-08-16 RX ORDER — 0.9 % SODIUM CHLORIDE 0.9 %
250 INTRAVENOUS SOLUTION INTRAVENOUS ONCE
Status: COMPLETED | OUTPATIENT
Start: 2019-08-16 | End: 2019-08-16

## 2019-08-16 RX ORDER — METHYLPREDNISOLONE SODIUM SUCCINATE 40 MG/ML
20 INJECTION, POWDER, LYOPHILIZED, FOR SOLUTION INTRAMUSCULAR; INTRAVENOUS ONCE
Status: COMPLETED | OUTPATIENT
Start: 2019-08-16 | End: 2019-08-16

## 2019-08-16 RX ORDER — DIPHENHYDRAMINE HCL 25 MG
25 TABLET ORAL SEE ADMIN INSTRUCTIONS
Status: COMPLETED | OUTPATIENT
Start: 2019-08-16 | End: 2019-08-16

## 2019-08-16 RX ORDER — SODIUM CHLORIDE 0.9 % (FLUSH) 0.9 %
10 SYRINGE (ML) INJECTION PRN
Status: DISCONTINUED | OUTPATIENT
Start: 2019-08-16 | End: 2019-08-17 | Stop reason: HOSPADM

## 2019-08-16 RX ORDER — HEPARIN SODIUM (PORCINE) LOCK FLUSH IV SOLN 100 UNIT/ML 100 UNIT/ML
500 SOLUTION INTRAVENOUS PRN
Status: DISCONTINUED | OUTPATIENT
Start: 2019-08-16 | End: 2019-08-17 | Stop reason: HOSPADM

## 2019-08-16 RX ADMIN — SODIUM CHLORIDE 250 ML: 9 INJECTION, SOLUTION INTRAVENOUS at 09:50

## 2019-08-16 RX ADMIN — Medication 20 ML: at 12:35

## 2019-08-16 RX ADMIN — Medication 10 ML: at 09:45

## 2019-08-16 RX ADMIN — Medication 500 UNITS: at 12:35

## 2019-08-16 RX ADMIN — METHYLPREDNISOLONE SODIUM SUCCINATE 20 MG: 40 INJECTION, POWDER, FOR SOLUTION INTRAMUSCULAR; INTRAVENOUS at 09:50

## 2019-08-16 RX ADMIN — ACETAMINOPHEN 650 MG: 325 TABLET ORAL at 09:50

## 2019-08-16 RX ADMIN — DIPHENHYDRAMINE HYDROCHLORIDE 25 MG: 25 TABLET ORAL at 09:50

## 2019-08-16 RX ADMIN — FUROSEMIDE 20 MG: 10 INJECTION, SOLUTION INTRAVENOUS at 12:32

## 2019-08-16 ASSESSMENT — PAIN SCALES - GENERAL
PAINLEVEL_OUTOF10: 0
PAINLEVEL_OUTOF10: 0

## 2019-08-16 NOTE — PROGRESS NOTES
Reviewed discharge instructions, voiced understanding, and copies of AVS given to take home. Pt tolerated Blood Transfusion well, with no s/s of an allergic reaction. Pt to private auto via [facility] per . Refused need for volunteer services.

## 2019-09-04 ENCOUNTER — HOSPITAL ENCOUNTER (OUTPATIENT)
Age: 69
Setting detail: SPECIMEN
Discharge: HOME | End: 2019-09-04
Payer: MEDICARE

## 2019-09-04 PROCEDURE — 86901 BLOOD TYPING SEROLOGIC RH(D): CPT

## 2019-09-04 PROCEDURE — P9016 RBC LEUKOCYTES REDUCED: HCPCS

## 2019-09-04 PROCEDURE — 86850 RBC ANTIBODY SCREEN: CPT

## 2019-09-04 PROCEDURE — 86900 BLOOD TYPING SEROLOGIC ABO: CPT

## 2019-09-04 PROCEDURE — 86922 COMPATIBILITY TEST ANTIGLOB: CPT

## 2019-09-05 ENCOUNTER — HOSPITAL ENCOUNTER (OUTPATIENT)
Dept: INFUSION THERAPY | Age: 69
Setting detail: INFUSION SERIES
Discharge: HOME | End: 2019-09-05
Payer: MEDICARE

## 2019-09-05 VITALS
OXYGEN SATURATION: 97 % | TEMPERATURE: 98.4 F | BODY MASS INDEX: 28.66 KG/M2 | DIASTOLIC BLOOD PRESSURE: 77 MMHG | RESPIRATION RATE: 12 BRPM | WEIGHT: 146 LBS | HEIGHT: 60 IN | HEART RATE: 80 BPM | SYSTOLIC BLOOD PRESSURE: 119 MMHG

## 2019-09-05 PROCEDURE — 99211 OFF/OP EST MAY X REQ PHY/QHP: CPT

## 2019-09-05 PROCEDURE — 2580000003 HC RX 258: Performed by: INTERNAL MEDICINE

## 2019-09-05 PROCEDURE — 6360000002 HC RX W HCPCS: Performed by: INTERNAL MEDICINE

## 2019-09-05 PROCEDURE — 96374 THER/PROPH/DIAG INJ IV PUSH: CPT

## 2019-09-05 PROCEDURE — 6370000000 HC RX 637 (ALT 250 FOR IP): Performed by: INTERNAL MEDICINE

## 2019-09-05 PROCEDURE — 36430 TRANSFUSION BLD/BLD COMPNT: CPT

## 2019-09-05 PROCEDURE — P9016 RBC LEUKOCYTES REDUCED: HCPCS

## 2019-09-05 RX ORDER — SODIUM CHLORIDE 0.9 % (FLUSH) 0.9 %
10 SYRINGE (ML) INJECTION PRN
Status: DISCONTINUED | OUTPATIENT
Start: 2019-09-05 | End: 2019-09-06 | Stop reason: HOSPADM

## 2019-09-05 RX ORDER — METHYLPREDNISOLONE SODIUM SUCCINATE 40 MG/ML
20 INJECTION, POWDER, LYOPHILIZED, FOR SOLUTION INTRAMUSCULAR; INTRAVENOUS ONCE
Status: COMPLETED | OUTPATIENT
Start: 2019-09-05 | End: 2019-09-05

## 2019-09-05 RX ORDER — FUROSEMIDE 10 MG/ML
20 INJECTION INTRAMUSCULAR; INTRAVENOUS SEE ADMIN INSTRUCTIONS
Status: DISCONTINUED | OUTPATIENT
Start: 2019-09-05 | End: 2019-09-06 | Stop reason: HOSPADM

## 2019-09-05 RX ORDER — DIPHENHYDRAMINE HCL 25 MG
25 TABLET ORAL SEE ADMIN INSTRUCTIONS
Status: COMPLETED | OUTPATIENT
Start: 2019-09-05 | End: 2019-09-05

## 2019-09-05 RX ORDER — HEPARIN SODIUM (PORCINE) LOCK FLUSH IV SOLN 100 UNIT/ML 100 UNIT/ML
500 SOLUTION INTRAVENOUS PRN
Status: DISCONTINUED | OUTPATIENT
Start: 2019-09-05 | End: 2019-09-06 | Stop reason: HOSPADM

## 2019-09-05 RX ORDER — 0.9 % SODIUM CHLORIDE 0.9 %
250 INTRAVENOUS SOLUTION INTRAVENOUS ONCE
Status: COMPLETED | OUTPATIENT
Start: 2019-09-05 | End: 2019-09-05

## 2019-09-05 RX ORDER — ACETAMINOPHEN 325 MG/1
650 TABLET ORAL SEE ADMIN INSTRUCTIONS
Status: COMPLETED | OUTPATIENT
Start: 2019-09-05 | End: 2019-09-05

## 2019-09-05 RX ORDER — 0.9 % SODIUM CHLORIDE 0.9 %
20 VIAL (ML) INJECTION PRN
Status: DISCONTINUED | OUTPATIENT
Start: 2019-09-05 | End: 2019-09-06 | Stop reason: HOSPADM

## 2019-09-05 RX ADMIN — ACETAMINOPHEN 650 MG: 325 TABLET ORAL at 08:35

## 2019-09-05 RX ADMIN — METHYLPREDNISOLONE SODIUM SUCCINATE 20 MG: 40 INJECTION, POWDER, LYOPHILIZED, FOR SOLUTION INTRAMUSCULAR; INTRAVENOUS at 08:35

## 2019-09-05 RX ADMIN — DIPHENHYDRAMINE HCL 25 MG: 25 TABLET ORAL at 08:35

## 2019-09-05 RX ADMIN — Medication 20 ML: at 11:10

## 2019-09-05 RX ADMIN — Medication 500 UNITS: at 11:10

## 2019-09-05 RX ADMIN — Medication 10 ML: at 08:15

## 2019-09-05 RX ADMIN — SODIUM CHLORIDE 250 ML: 9 INJECTION, SOLUTION INTRAVENOUS at 08:35

## 2019-09-05 ASSESSMENT — PAIN SCALES - GENERAL: PAINLEVEL_OUTOF10: 0

## 2019-09-05 NOTE — DISCHARGE SUMMARY
Tolerated Transfusion  well. Discharge instructions explained written copy given. Understanding verbalized. Discharged home with spouse. Down to private auto per wheelchair.

## 2019-09-12 ENCOUNTER — HOSPITAL ENCOUNTER (OUTPATIENT)
Age: 69
Setting detail: SPECIMEN
Discharge: HOME | End: 2019-09-12
Payer: MEDICARE

## 2019-09-12 PROCEDURE — 86300 IMMUNOASSAY TUMOR CA 15-3: CPT

## 2019-09-15 LAB
CA 27.29: 661.6 U/ML (ref 0–40)
CA 27.29: ABNORMAL U/ML (ref 0–40)

## 2019-09-19 ENCOUNTER — HOSPITAL ENCOUNTER (OUTPATIENT)
Age: 69
Setting detail: SPECIMEN
Discharge: HOME | End: 2019-09-19
Payer: MEDICARE

## 2019-09-19 PROCEDURE — 86922 COMPATIBILITY TEST ANTIGLOB: CPT

## 2019-09-19 PROCEDURE — 86850 RBC ANTIBODY SCREEN: CPT

## 2019-09-19 PROCEDURE — 86900 BLOOD TYPING SEROLOGIC ABO: CPT

## 2019-09-19 PROCEDURE — P9016 RBC LEUKOCYTES REDUCED: HCPCS

## 2019-09-19 PROCEDURE — 86901 BLOOD TYPING SEROLOGIC RH(D): CPT

## 2019-09-20 ENCOUNTER — HOSPITAL ENCOUNTER (OUTPATIENT)
Dept: INFUSION THERAPY | Age: 69
Setting detail: INFUSION SERIES
Discharge: HOME | End: 2019-09-20
Payer: MEDICARE

## 2019-09-20 VITALS
OXYGEN SATURATION: 96 % | BODY MASS INDEX: 28.66 KG/M2 | WEIGHT: 146 LBS | HEART RATE: 90 BPM | RESPIRATION RATE: 12 BRPM | DIASTOLIC BLOOD PRESSURE: 70 MMHG | SYSTOLIC BLOOD PRESSURE: 117 MMHG | HEIGHT: 60 IN | TEMPERATURE: 98.4 F

## 2019-09-20 PROCEDURE — 96375 TX/PRO/DX INJ NEW DRUG ADDON: CPT

## 2019-09-20 PROCEDURE — 99211 OFF/OP EST MAY X REQ PHY/QHP: CPT

## 2019-09-20 PROCEDURE — 6370000000 HC RX 637 (ALT 250 FOR IP): Performed by: INTERNAL MEDICINE

## 2019-09-20 PROCEDURE — 36430 TRANSFUSION BLD/BLD COMPNT: CPT

## 2019-09-20 PROCEDURE — 2580000003 HC RX 258: Performed by: INTERNAL MEDICINE

## 2019-09-20 PROCEDURE — 96374 THER/PROPH/DIAG INJ IV PUSH: CPT

## 2019-09-20 PROCEDURE — P9016 RBC LEUKOCYTES REDUCED: HCPCS

## 2019-09-20 PROCEDURE — 6360000002 HC RX W HCPCS: Performed by: INTERNAL MEDICINE

## 2019-09-20 RX ORDER — METHYLPREDNISOLONE SODIUM SUCCINATE 40 MG/ML
20 INJECTION, POWDER, LYOPHILIZED, FOR SOLUTION INTRAMUSCULAR; INTRAVENOUS ONCE
Status: COMPLETED | OUTPATIENT
Start: 2019-09-20 | End: 2019-09-20

## 2019-09-20 RX ORDER — 0.9 % SODIUM CHLORIDE 0.9 %
10 VIAL (ML) INJECTION PRN
Status: DISCONTINUED | OUTPATIENT
Start: 2019-09-20 | End: 2019-09-21 | Stop reason: HOSPADM

## 2019-09-20 RX ORDER — DIPHENHYDRAMINE HCL 25 MG
25 TABLET ORAL SEE ADMIN INSTRUCTIONS
Status: COMPLETED | OUTPATIENT
Start: 2019-09-20 | End: 2019-09-20

## 2019-09-20 RX ORDER — 0.9 % SODIUM CHLORIDE 0.9 %
250 INTRAVENOUS SOLUTION INTRAVENOUS ONCE
Status: COMPLETED | OUTPATIENT
Start: 2019-09-20 | End: 2019-09-20

## 2019-09-20 RX ORDER — FUROSEMIDE 10 MG/ML
20 INJECTION INTRAMUSCULAR; INTRAVENOUS SEE ADMIN INSTRUCTIONS
Status: COMPLETED | OUTPATIENT
Start: 2019-09-20 | End: 2019-09-20

## 2019-09-20 RX ORDER — ACETAMINOPHEN 325 MG/1
650 TABLET ORAL SEE ADMIN INSTRUCTIONS
Status: COMPLETED | OUTPATIENT
Start: 2019-09-20 | End: 2019-09-20

## 2019-09-20 RX ORDER — HEPARIN SODIUM (PORCINE) LOCK FLUSH IV SOLN 100 UNIT/ML 100 UNIT/ML
500 SOLUTION INTRAVENOUS PRN
Status: DISCONTINUED | OUTPATIENT
Start: 2019-09-20 | End: 2019-09-21 | Stop reason: HOSPADM

## 2019-09-20 RX ADMIN — METHYLPREDNISOLONE SODIUM SUCCINATE 20 MG: 40 INJECTION, POWDER, FOR SOLUTION INTRAMUSCULAR; INTRAVENOUS at 08:30

## 2019-09-20 RX ADMIN — DIPHENHYDRAMINE HYDROCHLORIDE 25 MG: 25 TABLET ORAL at 08:25

## 2019-09-20 RX ADMIN — ACETAMINOPHEN 650 MG: 325 TABLET ORAL at 08:25

## 2019-09-20 RX ADMIN — Medication 500 UNITS: at 11:04

## 2019-09-20 RX ADMIN — Medication 500 UNITS: at 11:02

## 2019-09-20 RX ADMIN — SODIUM CHLORIDE 250 ML: 9 INJECTION, SOLUTION INTRAVENOUS at 08:30

## 2019-09-20 RX ADMIN — Medication 10 ML: at 08:25

## 2019-09-20 RX ADMIN — FUROSEMIDE 20 MG: 10 INJECTION, SOLUTION INTRAVENOUS at 11:01

## 2019-09-20 RX ADMIN — Medication 10 ML: at 11:03

## 2019-09-20 ASSESSMENT — PAIN SCALES - GENERAL: PAINLEVEL_OUTOF10: 0

## 2019-09-20 NOTE — PLAN OF CARE
To unit room 3 for Blood Transfusion. Reorientated to unit. Procedure and plan of care explained. Questions answered. Understanding verbalized.

## 2019-09-27 ENCOUNTER — HOSPITAL ENCOUNTER (INPATIENT)
Age: 69
LOS: 5 days | Discharge: NURSING, REHAB OR LONG-TERM CARE | DRG: 470 | End: 2019-10-02
Attending: EMERGENCY MEDICINE | Admitting: INTERNAL MEDICINE
Payer: MEDICARE

## 2019-09-27 ENCOUNTER — APPOINTMENT (OUTPATIENT)
Dept: GENERAL RADIOLOGY | Age: 69
DRG: 470 | End: 2019-09-27
Payer: MEDICARE

## 2019-09-27 ENCOUNTER — APPOINTMENT (OUTPATIENT)
Dept: CT IMAGING | Age: 69
DRG: 470 | End: 2019-09-27
Payer: MEDICARE

## 2019-09-27 DIAGNOSIS — S22.41XA CLOSED FRACTURE OF MULTIPLE RIBS OF RIGHT SIDE, INITIAL ENCOUNTER: ICD-10-CM

## 2019-09-27 DIAGNOSIS — C50.919 METASTATIC BREAST CANCER (HCC): ICD-10-CM

## 2019-09-27 DIAGNOSIS — S72.002A CLOSED LEFT HIP FRACTURE, INITIAL ENCOUNTER (HCC): Primary | ICD-10-CM

## 2019-09-27 PROBLEM — S72.045A NONDISPLACED FRACTURE OF BASE OF NECK OF LEFT FEMUR, INITIAL ENCOUNTER FOR CLOSED FRACTURE (HCC): Status: ACTIVE | Noted: 2019-09-27

## 2019-09-27 LAB
ALBUMIN SERPL-MCNC: 3.3 GM/DL (ref 3.4–5)
ALP BLD-CCNC: 371 IU/L (ref 40–129)
ALT SERPL-CCNC: 83 U/L (ref 10–40)
ANION GAP SERPL CALCULATED.3IONS-SCNC: 12 MMOL/L (ref 4–16)
ANISOCYTOSIS: ABNORMAL
AST SERPL-CCNC: 102 IU/L (ref 15–37)
BANDED NEUTROPHILS ABSOLUTE COUNT: 0.67 K/CU MM
BANDED NEUTROPHILS RELATIVE PERCENT: 16 % (ref 5–11)
BILIRUB SERPL-MCNC: 0.7 MG/DL (ref 0–1)
BUN BLDV-MCNC: 14 MG/DL (ref 6–23)
CALCIUM SERPL-MCNC: 8.8 MG/DL (ref 8.3–10.6)
CHLORIDE BLD-SCNC: 94 MMOL/L (ref 99–110)
CO2: 31 MMOL/L (ref 21–32)
CREAT SERPL-MCNC: 0.4 MG/DL (ref 0.6–1.1)
DIFFERENTIAL TYPE: ABNORMAL
GFR AFRICAN AMERICAN: >60 ML/MIN/1.73M2
GFR NON-AFRICAN AMERICAN: >60 ML/MIN/1.73M2
GLUCOSE BLD-MCNC: 144 MG/DL (ref 70–99)
HCT VFR BLD CALC: 27.9 % (ref 37–47)
HEMOGLOBIN: 8.7 GM/DL (ref 12.5–16)
HYPOCHROMIA: ABNORMAL
INR BLD: 1.35 INDEX
LYMPHOCYTES ABSOLUTE: 0.5 K/CU MM
LYMPHOCYTES RELATIVE PERCENT: 11 % (ref 24–44)
MCH RBC QN AUTO: 29.2 PG (ref 27–31)
MCHC RBC AUTO-ENTMCNC: 31.2 % (ref 32–36)
MCV RBC AUTO: 93.6 FL (ref 78–100)
METAMYELOCYTES ABSOLUTE COUNT: 0.13 K/CU MM
METAMYELOCYTES PERCENT: 3 %
MONOCYTES ABSOLUTE: 0.7 K/CU MM
MONOCYTES RELATIVE PERCENT: 16 % (ref 0–4)
MYELOCYTE PERCENT: 1 %
MYELOCYTES ABSOLUTE COUNT: 0.04 K/CU MM
NUCLEATED RED BLOOD CELLS: 12
PDW BLD-RTO: 18.7 % (ref 11.7–14.9)
PLATELET # BLD: ABNORMAL K/CU MM (ref 140–440)
PMV BLD AUTO: 11 FL (ref 7.5–11.1)
POTASSIUM SERPL-SCNC: ABNORMAL MMOL/L (ref 3.5–5.1)
PROMYELOCYTES ABSOLUTE COUNT: 0.04 K/CU MM
PROMYELOCYTES PERCENT: 1 %
PROTHROMBIN TIME: 15.7 SECONDS (ref 9.12–12.5)
RBC # BLD: 2.98 M/CU MM (ref 4.2–5.4)
SEGMENTED NEUTROPHILS ABSOLUTE COUNT: 2.1 K/CU MM
SEGMENTED NEUTROPHILS RELATIVE PERCENT: 52 % (ref 36–66)
SODIUM BLD-SCNC: 137 MMOL/L (ref 135–145)
TOTAL PROTEIN: 6 GM/DL (ref 6.4–8.2)
WBC # BLD: 4.2 K/CU MM (ref 4–10.5)

## 2019-09-27 PROCEDURE — 0SRS0J9 REPLACEMENT OF LEFT HIP JOINT, FEMORAL SURFACE WITH SYNTHETIC SUBSTITUTE, CEMENTED, OPEN APPROACH: ICD-10-PCS | Performed by: ORTHOPAEDIC SURGERY

## 2019-09-27 PROCEDURE — 73501 X-RAY EXAM HIP UNI 1 VIEW: CPT

## 2019-09-27 PROCEDURE — 85610 PROTHROMBIN TIME: CPT

## 2019-09-27 PROCEDURE — 99285 EMERGENCY DEPT VISIT HI MDM: CPT

## 2019-09-27 PROCEDURE — 96376 TX/PRO/DX INJ SAME DRUG ADON: CPT

## 2019-09-27 PROCEDURE — 86850 RBC ANTIBODY SCREEN: CPT

## 2019-09-27 PROCEDURE — 85007 BL SMEAR W/DIFF WBC COUNT: CPT

## 2019-09-27 PROCEDURE — P9016 RBC LEUKOCYTES REDUCED: HCPCS

## 2019-09-27 PROCEDURE — 6360000002 HC RX W HCPCS: Performed by: PHYSICIAN ASSISTANT

## 2019-09-27 PROCEDURE — 96374 THER/PROPH/DIAG INJ IV PUSH: CPT

## 2019-09-27 PROCEDURE — 6360000002 HC RX W HCPCS: Performed by: INTERNAL MEDICINE

## 2019-09-27 PROCEDURE — 86900 BLOOD TYPING SEROLOGIC ABO: CPT

## 2019-09-27 PROCEDURE — 85027 COMPLETE CBC AUTOMATED: CPT

## 2019-09-27 PROCEDURE — 86922 COMPATIBILITY TEST ANTIGLOB: CPT

## 2019-09-27 PROCEDURE — 86901 BLOOD TYPING SEROLOGIC RH(D): CPT

## 2019-09-27 PROCEDURE — 1200000000 HC SEMI PRIVATE

## 2019-09-27 PROCEDURE — 71250 CT THORAX DX C-: CPT

## 2019-09-27 PROCEDURE — 80053 COMPREHEN METABOLIC PANEL: CPT

## 2019-09-27 PROCEDURE — 71101 X-RAY EXAM UNILAT RIBS/CHEST: CPT

## 2019-09-27 RX ORDER — HYDROMORPHONE HCL 110MG/55ML
1 PATIENT CONTROLLED ANALGESIA SYRINGE INTRAVENOUS ONCE
Status: DISCONTINUED | OUTPATIENT
Start: 2019-09-27 | End: 2019-09-27

## 2019-09-27 RX ORDER — ONDANSETRON 2 MG/ML
4 INJECTION INTRAMUSCULAR; INTRAVENOUS EVERY 6 HOURS PRN
Status: DISCONTINUED | OUTPATIENT
Start: 2019-09-27 | End: 2019-10-02 | Stop reason: HOSPADM

## 2019-09-27 RX ORDER — PANTOPRAZOLE SODIUM 40 MG/1
40 TABLET, DELAYED RELEASE ORAL
Status: DISCONTINUED | OUTPATIENT
Start: 2019-09-28 | End: 2019-10-02 | Stop reason: HOSPADM

## 2019-09-27 RX ORDER — POTASSIUM CHLORIDE 7.45 MG/ML
10 INJECTION INTRAVENOUS ONCE
Status: COMPLETED | OUTPATIENT
Start: 2019-09-28 | End: 2019-09-28

## 2019-09-27 RX ORDER — HYDROCODONE BITARTRATE AND ACETAMINOPHEN 7.5; 325 MG/1; MG/1
1 TABLET ORAL EVERY 6 HOURS PRN
Status: DISCONTINUED | OUTPATIENT
Start: 2019-09-27 | End: 2019-09-29

## 2019-09-27 RX ORDER — HYDROCODONE BITARTRATE AND ACETAMINOPHEN 7.5; 325 MG/1; MG/1
1 TABLET ORAL EVERY 6 HOURS PRN
Status: ON HOLD | COMMUNITY
End: 2019-10-02 | Stop reason: SDUPTHER

## 2019-09-27 RX ORDER — SODIUM CHLORIDE 0.9 % (FLUSH) 0.9 %
10 SYRINGE (ML) INJECTION EVERY 12 HOURS SCHEDULED
Status: DISCONTINUED | OUTPATIENT
Start: 2019-09-27 | End: 2019-10-02 | Stop reason: HOSPADM

## 2019-09-27 RX ORDER — POTASSIUM CHLORIDE 29.8 MG/ML
20 INJECTION INTRAVENOUS ONCE
Status: DISCONTINUED | OUTPATIENT
Start: 2019-09-27 | End: 2019-09-27 | Stop reason: CLARIF

## 2019-09-27 RX ORDER — ASPIRIN 81 MG/1
81 TABLET, CHEWABLE ORAL DAILY
Status: DISCONTINUED | OUTPATIENT
Start: 2019-09-28 | End: 2019-09-28

## 2019-09-27 RX ORDER — VITAMIN E 268 MG
400 CAPSULE ORAL DAILY
Status: DISCONTINUED | OUTPATIENT
Start: 2019-09-28 | End: 2019-10-02 | Stop reason: HOSPADM

## 2019-09-27 RX ORDER — NADOLOL 20 MG/1
80 TABLET ORAL DAILY
Status: DISCONTINUED | OUTPATIENT
Start: 2019-09-28 | End: 2019-10-02 | Stop reason: HOSPADM

## 2019-09-27 RX ORDER — HYDROCHLOROTHIAZIDE 25 MG/1
25 TABLET ORAL DAILY
Status: DISCONTINUED | OUTPATIENT
Start: 2019-09-28 | End: 2019-09-28

## 2019-09-27 RX ORDER — POTASSIUM CHLORIDE 1.5 G/1.77G
20 POWDER, FOR SOLUTION ORAL DAILY
Status: DISCONTINUED | OUTPATIENT
Start: 2019-09-28 | End: 2019-10-02 | Stop reason: HOSPADM

## 2019-09-27 RX ORDER — ASCORBIC ACID 500 MG
500 TABLET ORAL DAILY
Status: DISCONTINUED | OUTPATIENT
Start: 2019-09-28 | End: 2019-10-02 | Stop reason: HOSPADM

## 2019-09-27 RX ORDER — ONDANSETRON 4 MG/1
8 TABLET, ORALLY DISINTEGRATING ORAL EVERY 8 HOURS PRN
Status: DISCONTINUED | OUTPATIENT
Start: 2019-09-27 | End: 2019-10-02 | Stop reason: HOSPADM

## 2019-09-27 RX ORDER — DEXAMETHASONE 4 MG/1
0.5 TABLET ORAL EVERY OTHER DAY
COMMUNITY

## 2019-09-27 RX ORDER — HYDROMORPHONE HCL 110MG/55ML
0.5 PATIENT CONTROLLED ANALGESIA SYRINGE INTRAVENOUS EVERY 30 MIN PRN
Status: DISCONTINUED | OUTPATIENT
Start: 2019-09-27 | End: 2019-09-27

## 2019-09-27 RX ORDER — MORPHINE SULFATE 4 MG/ML
2 INJECTION, SOLUTION INTRAMUSCULAR; INTRAVENOUS EVERY 4 HOURS PRN
Status: DISCONTINUED | OUTPATIENT
Start: 2019-09-27 | End: 2019-10-02 | Stop reason: HOSPADM

## 2019-09-27 RX ORDER — MORPHINE SULFATE 30 MG/1
30 TABLET, FILM COATED, EXTENDED RELEASE ORAL 2 TIMES DAILY
Status: DISCONTINUED | OUTPATIENT
Start: 2019-09-27 | End: 2019-10-02 | Stop reason: HOSPADM

## 2019-09-27 RX ORDER — MULTIVITAMIN WITH FOLIC ACID 400 MCG
1 TABLET ORAL DAILY
Status: DISCONTINUED | OUTPATIENT
Start: 2019-09-28 | End: 2019-10-02 | Stop reason: HOSPADM

## 2019-09-27 RX ORDER — CALCIUM POLYCARBOPHIL 625 MG 625 MG/1
625 TABLET ORAL 2 TIMES DAILY WITH MEALS
Status: DISCONTINUED | OUTPATIENT
Start: 2019-09-28 | End: 2019-10-02 | Stop reason: HOSPADM

## 2019-09-27 RX ORDER — 0.9 % SODIUM CHLORIDE 0.9 %
250 INTRAVENOUS SOLUTION INTRAVENOUS ONCE
Status: DISCONTINUED | OUTPATIENT
Start: 2019-09-27 | End: 2019-10-02 | Stop reason: HOSPADM

## 2019-09-27 RX ORDER — CETIRIZINE HYDROCHLORIDE 10 MG/1
10 TABLET ORAL DAILY
Status: DISCONTINUED | OUTPATIENT
Start: 2019-09-28 | End: 2019-10-02 | Stop reason: HOSPADM

## 2019-09-27 RX ORDER — DEXAMETHASONE 2 MG/1
2 TABLET ORAL
Status: DISCONTINUED | OUTPATIENT
Start: 2019-09-29 | End: 2019-10-02 | Stop reason: HOSPADM

## 2019-09-27 RX ORDER — SODIUM CHLORIDE 0.9 % (FLUSH) 0.9 %
10 SYRINGE (ML) INJECTION PRN
Status: DISCONTINUED | OUTPATIENT
Start: 2019-09-27 | End: 2019-10-02 | Stop reason: HOSPADM

## 2019-09-27 RX ORDER — DEXAMETHASONE 4 MG/1
4 TABLET ORAL
Status: DISCONTINUED | OUTPATIENT
Start: 2019-09-28 | End: 2019-10-02 | Stop reason: HOSPADM

## 2019-09-27 RX ORDER — POTASSIUM CHLORIDE 7.45 MG/ML
10 INJECTION INTRAVENOUS ONCE
Status: COMPLETED | OUTPATIENT
Start: 2019-09-27 | End: 2019-09-28

## 2019-09-27 RX ADMIN — HYDROMORPHONE HYDROCHLORIDE 0.5 MG: 2 INJECTION, SOLUTION INTRAMUSCULAR; INTRAVENOUS; SUBCUTANEOUS at 14:55

## 2019-09-27 RX ADMIN — HYDROMORPHONE HYDROCHLORIDE 0.5 MG: 2 INJECTION, SOLUTION INTRAMUSCULAR; INTRAVENOUS; SUBCUTANEOUS at 20:11

## 2019-09-27 RX ADMIN — MORPHINE SULFATE 2 MG: 4 INJECTION, SOLUTION INTRAMUSCULAR; INTRAVENOUS at 22:56

## 2019-09-27 RX ADMIN — HYDROMORPHONE HYDROCHLORIDE 0.5 MG: 2 INJECTION, SOLUTION INTRAMUSCULAR; INTRAVENOUS; SUBCUTANEOUS at 17:20

## 2019-09-27 ASSESSMENT — PAIN DESCRIPTION - ONSET: ONSET: ON-GOING

## 2019-09-27 ASSESSMENT — PAIN SCALES - GENERAL
PAINLEVEL_OUTOF10: 8
PAINLEVEL_OUTOF10: 4
PAINLEVEL_OUTOF10: 5
PAINLEVEL_OUTOF10: 5
PAINLEVEL_OUTOF10: 4
PAINLEVEL_OUTOF10: 6
PAINLEVEL_OUTOF10: 5
PAINLEVEL_OUTOF10: 5

## 2019-09-27 ASSESSMENT — PAIN DESCRIPTION - ORIENTATION: ORIENTATION: MID;LEFT;RIGHT

## 2019-09-27 ASSESSMENT — PAIN DESCRIPTION - FREQUENCY: FREQUENCY: CONTINUOUS

## 2019-09-27 ASSESSMENT — PAIN DESCRIPTION - LOCATION: LOCATION: BACK;HIP;RIB CAGE

## 2019-09-27 ASSESSMENT — PAIN DESCRIPTION - PROGRESSION: CLINICAL_PROGRESSION: NOT CHANGED

## 2019-09-27 ASSESSMENT — PAIN DESCRIPTION - PAIN TYPE
TYPE: ACUTE PAIN
TYPE: ACUTE PAIN

## 2019-09-27 ASSESSMENT — PAIN DESCRIPTION - DESCRIPTORS: DESCRIPTORS: ACHING;DISCOMFORT;SHARP

## 2019-09-27 ASSESSMENT — PAIN - FUNCTIONAL ASSESSMENT: PAIN_FUNCTIONAL_ASSESSMENT: PREVENTS OR INTERFERES WITH MANY ACTIVE NOT PASSIVE ACTIVITIES

## 2019-09-28 ENCOUNTER — APPOINTMENT (OUTPATIENT)
Dept: GENERAL RADIOLOGY | Age: 69
DRG: 470 | End: 2019-09-28
Payer: MEDICARE

## 2019-09-28 ENCOUNTER — ANESTHESIA EVENT (OUTPATIENT)
Dept: OPERATING ROOM | Age: 69
DRG: 470 | End: 2019-09-28
Payer: MEDICARE

## 2019-09-28 ENCOUNTER — ANESTHESIA (OUTPATIENT)
Dept: OPERATING ROOM | Age: 69
DRG: 470 | End: 2019-09-28
Payer: MEDICARE

## 2019-09-28 VITALS
SYSTOLIC BLOOD PRESSURE: 130 MMHG | OXYGEN SATURATION: 100 % | TEMPERATURE: 99.4 F | RESPIRATION RATE: 20 BRPM | DIASTOLIC BLOOD PRESSURE: 71 MMHG

## 2019-09-28 LAB
ANION GAP SERPL CALCULATED.3IONS-SCNC: 11 MMOL/L (ref 4–16)
ANISOCYTOSIS: ABNORMAL
BANDED NEUTROPHILS ABSOLUTE COUNT: 0.63 K/CU MM
BANDED NEUTROPHILS RELATIVE PERCENT: 18 % (ref 5–11)
BUN BLDV-MCNC: 14 MG/DL (ref 6–23)
CALCIUM SERPL-MCNC: 8.8 MG/DL (ref 8.3–10.6)
CHLORIDE BLD-SCNC: 94 MMOL/L (ref 99–110)
CO2: 31 MMOL/L (ref 21–32)
CREAT SERPL-MCNC: 0.4 MG/DL (ref 0.6–1.1)
DIFFERENTIAL TYPE: ABNORMAL
EOSINOPHILS ABSOLUTE: 0 K/CU MM
EOSINOPHILS RELATIVE PERCENT: 1 % (ref 0–3)
GFR AFRICAN AMERICAN: >60 ML/MIN/1.73M2
GFR NON-AFRICAN AMERICAN: >60 ML/MIN/1.73M2
GLUCOSE BLD-MCNC: 133 MG/DL (ref 70–99)
HCT VFR BLD CALC: 25.1 % (ref 37–47)
HEMOGLOBIN: 7.9 GM/DL (ref 12.5–16)
LYMPHOCYTES ABSOLUTE: 0.7 K/CU MM
LYMPHOCYTES RELATIVE PERCENT: 19 % (ref 24–44)
MACROCYTES: ABNORMAL
MAGNESIUM: 1.3 MG/DL (ref 1.8–2.4)
MCH RBC QN AUTO: 29.2 PG (ref 27–31)
MCHC RBC AUTO-ENTMCNC: 31.5 % (ref 32–36)
MCV RBC AUTO: 92.6 FL (ref 78–100)
METAMYELOCYTES ABSOLUTE COUNT: 0.07 K/CU MM
METAMYELOCYTES PERCENT: 2 %
MONOCYTES ABSOLUTE: 0.6 K/CU MM
MONOCYTES RELATIVE PERCENT: 16 % (ref 0–4)
MYELOCYTE PERCENT: 2 %
MYELOCYTES ABSOLUTE COUNT: 0.07 K/CU MM
NUCLEATED RED BLOOD CELLS: 31
PDW BLD-RTO: 19 % (ref 11.7–14.9)
PLATELET # BLD: 68 K/CU MM (ref 140–440)
PMV BLD AUTO: 11.2 FL (ref 7.5–11.1)
POLYCHROMASIA: ABNORMAL
POTASSIUM SERPL-SCNC: 3.1 MMOL/L (ref 3.5–5.1)
PROMYELOCYTES ABSOLUTE COUNT: 0.04 K/CU MM
PROMYELOCYTES PERCENT: 1 %
RBC # BLD: 2.71 M/CU MM (ref 4.2–5.4)
SEGMENTED NEUTROPHILS ABSOLUTE COUNT: 1.4 K/CU MM
SEGMENTED NEUTROPHILS RELATIVE PERCENT: 41 % (ref 36–66)
SODIUM BLD-SCNC: 136 MMOL/L (ref 135–145)
WBC # BLD: 3.5 K/CU MM (ref 4–10.5)
WBC # BLD: ABNORMAL 10*3/UL

## 2019-09-28 PROCEDURE — 94150 VITAL CAPACITY TEST: CPT

## 2019-09-28 PROCEDURE — 6360000002 HC RX W HCPCS: Performed by: INTERNAL MEDICINE

## 2019-09-28 PROCEDURE — 94664 DEMO&/EVAL PT USE INHALER: CPT

## 2019-09-28 PROCEDURE — 88342 IMHCHEM/IMCYTCHM 1ST ANTB: CPT

## 2019-09-28 PROCEDURE — 88341 IMHCHEM/IMCYTCHM EA ADD ANTB: CPT

## 2019-09-28 PROCEDURE — 6370000000 HC RX 637 (ALT 250 FOR IP): Performed by: ORTHOPAEDIC SURGERY

## 2019-09-28 PROCEDURE — 88305 TISSUE EXAM BY PATHOLOGIST: CPT

## 2019-09-28 PROCEDURE — 1200000000 HC SEMI PRIVATE

## 2019-09-28 PROCEDURE — C1776 JOINT DEVICE (IMPLANTABLE): HCPCS | Performed by: ORTHOPAEDIC SURGERY

## 2019-09-28 PROCEDURE — 3700000000 HC ANESTHESIA ATTENDED CARE: Performed by: ORTHOPAEDIC SURGERY

## 2019-09-28 PROCEDURE — 83735 ASSAY OF MAGNESIUM: CPT

## 2019-09-28 PROCEDURE — 6370000000 HC RX 637 (ALT 250 FOR IP): Performed by: INTERNAL MEDICINE

## 2019-09-28 PROCEDURE — 94761 N-INVAS EAR/PLS OXIMETRY MLT: CPT

## 2019-09-28 PROCEDURE — 7100000001 HC PACU RECOVERY - ADDTL 15 MIN: Performed by: ORTHOPAEDIC SURGERY

## 2019-09-28 PROCEDURE — 6360000002 HC RX W HCPCS: Performed by: ORTHOPAEDIC SURGERY

## 2019-09-28 PROCEDURE — 6360000002 HC RX W HCPCS: Performed by: NURSE ANESTHETIST, CERTIFIED REGISTERED

## 2019-09-28 PROCEDURE — 3600000004 HC SURGERY LEVEL 4 BASE: Performed by: ORTHOPAEDIC SURGERY

## 2019-09-28 PROCEDURE — 73501 X-RAY EXAM HIP UNI 1 VIEW: CPT

## 2019-09-28 PROCEDURE — 2500000003 HC RX 250 WO HCPCS: Performed by: NURSE ANESTHETIST, CERTIFIED REGISTERED

## 2019-09-28 PROCEDURE — 80048 BASIC METABOLIC PNL TOTAL CA: CPT

## 2019-09-28 PROCEDURE — 88360 TUMOR IMMUNOHISTOCHEM/MANUAL: CPT

## 2019-09-28 PROCEDURE — 85007 BL SMEAR W/DIFF WBC COUNT: CPT

## 2019-09-28 PROCEDURE — 88311 DECALCIFY TISSUE: CPT

## 2019-09-28 PROCEDURE — 7100000000 HC PACU RECOVERY - FIRST 15 MIN: Performed by: ORTHOPAEDIC SURGERY

## 2019-09-28 PROCEDURE — 85027 COMPLETE CBC AUTOMATED: CPT

## 2019-09-28 PROCEDURE — C1713 ANCHOR/SCREW BN/BN,TIS/BN: HCPCS | Performed by: ORTHOPAEDIC SURGERY

## 2019-09-28 PROCEDURE — 3700000001 HC ADD 15 MINUTES (ANESTHESIA): Performed by: ORTHOPAEDIC SURGERY

## 2019-09-28 PROCEDURE — 2580000003 HC RX 258: Performed by: SPECIALIST

## 2019-09-28 PROCEDURE — 2580000003 HC RX 258: Performed by: ORTHOPAEDIC SURGERY

## 2019-09-28 PROCEDURE — 2580000003 HC RX 258: Performed by: INTERNAL MEDICINE

## 2019-09-28 PROCEDURE — 3600000014 HC SURGERY LEVEL 4 ADDTL 15MIN: Performed by: ORTHOPAEDIC SURGERY

## 2019-09-28 PROCEDURE — 6360000002 HC RX W HCPCS: Performed by: ANESTHESIOLOGY

## 2019-09-28 PROCEDURE — 2709999900 HC NON-CHARGEABLE SUPPLY: Performed by: ORTHOPAEDIC SURGERY

## 2019-09-28 DEVICE — ADAPTER FEM L-4MM UPLR NEUT NK: Type: IMPLANTABLE DEVICE | Site: HIP | Status: FUNCTIONAL

## 2019-09-28 DEVICE — CEMENT BNE 20ML 40GM HALF DOSE PMMA W/O GENT HI VISC RADPQ: Type: IMPLANTABLE DEVICE | Site: HIP | Status: FUNCTIONAL

## 2019-09-28 DEVICE — KIT BNE CEM PREP FEM QUIK-USE 1 PER CA: Type: IMPLANTABLE DEVICE | Site: HIP | Status: FUNCTIONAL

## 2019-09-28 DEVICE — IMPLANTABLE DEVICE: Type: IMPLANTABLE DEVICE | Site: HIP | Status: FUNCTIONAL

## 2019-09-28 RX ORDER — 0.9 % SODIUM CHLORIDE 0.9 %
500 INTRAVENOUS SOLUTION INTRAVENOUS
Status: DISCONTINUED | OUTPATIENT
Start: 2019-09-28 | End: 2019-09-28 | Stop reason: HOSPADM

## 2019-09-28 RX ORDER — PROMETHAZINE HYDROCHLORIDE 25 MG/ML
6.25 INJECTION, SOLUTION INTRAMUSCULAR; INTRAVENOUS
Status: DISCONTINUED | OUTPATIENT
Start: 2019-09-28 | End: 2019-09-28 | Stop reason: HOSPADM

## 2019-09-28 RX ORDER — FENTANYL CITRATE 50 UG/ML
50 INJECTION, SOLUTION INTRAMUSCULAR; INTRAVENOUS EVERY 5 MIN PRN
Status: DISCONTINUED | OUTPATIENT
Start: 2019-09-28 | End: 2019-09-28 | Stop reason: HOSPADM

## 2019-09-28 RX ORDER — PROMETHAZINE HYDROCHLORIDE 12.5 MG/1
12.5 TABLET ORAL EVERY 6 HOURS PRN
Status: DISCONTINUED | OUTPATIENT
Start: 2019-09-28 | End: 2019-10-02 | Stop reason: HOSPADM

## 2019-09-28 RX ORDER — ROCURONIUM BROMIDE 10 MG/ML
INJECTION, SOLUTION INTRAVENOUS PRN
Status: DISCONTINUED | OUTPATIENT
Start: 2019-09-28 | End: 2019-09-28 | Stop reason: SDUPTHER

## 2019-09-28 RX ORDER — LIDOCAINE HYDROCHLORIDE 20 MG/ML
INJECTION, SOLUTION INTRAVENOUS PRN
Status: DISCONTINUED | OUTPATIENT
Start: 2019-09-28 | End: 2019-09-28 | Stop reason: SDUPTHER

## 2019-09-28 RX ORDER — MAGNESIUM SULFATE 4 G/50ML
4 INJECTION INTRAVENOUS ONCE
Status: COMPLETED | OUTPATIENT
Start: 2019-09-28 | End: 2019-09-28

## 2019-09-28 RX ORDER — MEPERIDINE HYDROCHLORIDE 25 MG/ML
12.5 INJECTION INTRAMUSCULAR; INTRAVENOUS; SUBCUTANEOUS EVERY 5 MIN PRN
Status: DISCONTINUED | OUTPATIENT
Start: 2019-09-28 | End: 2019-09-28 | Stop reason: HOSPADM

## 2019-09-28 RX ORDER — DIPHENHYDRAMINE HYDROCHLORIDE 50 MG/ML
12.5 INJECTION INTRAMUSCULAR; INTRAVENOUS
Status: DISCONTINUED | OUTPATIENT
Start: 2019-09-28 | End: 2019-09-28 | Stop reason: HOSPADM

## 2019-09-28 RX ORDER — DEXAMETHASONE SODIUM PHOSPHATE 4 MG/ML
INJECTION, SOLUTION INTRA-ARTICULAR; INTRALESIONAL; INTRAMUSCULAR; INTRAVENOUS; SOFT TISSUE PRN
Status: DISCONTINUED | OUTPATIENT
Start: 2019-09-28 | End: 2019-09-28 | Stop reason: SDUPTHER

## 2019-09-28 RX ORDER — HYDROMORPHONE HCL 110MG/55ML
0.5 PATIENT CONTROLLED ANALGESIA SYRINGE INTRAVENOUS EVERY 5 MIN PRN
Status: DISCONTINUED | OUTPATIENT
Start: 2019-09-28 | End: 2019-09-28 | Stop reason: HOSPADM

## 2019-09-28 RX ORDER — PROPOFOL 10 MG/ML
INJECTION, EMULSION INTRAVENOUS PRN
Status: DISCONTINUED | OUTPATIENT
Start: 2019-09-28 | End: 2019-09-28 | Stop reason: SDUPTHER

## 2019-09-28 RX ORDER — LABETALOL 20 MG/4 ML (5 MG/ML) INTRAVENOUS SYRINGE
5 EVERY 10 MIN PRN
Status: DISCONTINUED | OUTPATIENT
Start: 2019-09-28 | End: 2019-09-28 | Stop reason: HOSPADM

## 2019-09-28 RX ORDER — SODIUM CHLORIDE 9 MG/ML
INJECTION, SOLUTION INTRAVENOUS CONTINUOUS
Status: DISCONTINUED | OUTPATIENT
Start: 2019-09-28 | End: 2019-09-28

## 2019-09-28 RX ORDER — CEFAZOLIN SODIUM 1 G/50ML
1 INJECTION, SOLUTION INTRAVENOUS EVERY 8 HOURS
Status: COMPLETED | OUTPATIENT
Start: 2019-09-28 | End: 2019-09-29

## 2019-09-28 RX ORDER — ONDANSETRON 2 MG/ML
4 INJECTION INTRAMUSCULAR; INTRAVENOUS
Status: DISCONTINUED | OUTPATIENT
Start: 2019-09-28 | End: 2019-09-28 | Stop reason: HOSPADM

## 2019-09-28 RX ORDER — ONDANSETRON 2 MG/ML
INJECTION INTRAMUSCULAR; INTRAVENOUS PRN
Status: DISCONTINUED | OUTPATIENT
Start: 2019-09-28 | End: 2019-09-28 | Stop reason: SDUPTHER

## 2019-09-28 RX ORDER — CEFAZOLIN SODIUM 2 G/100ML
2 INJECTION, SOLUTION INTRAVENOUS ONCE
Status: DISCONTINUED | OUTPATIENT
Start: 2019-09-28 | End: 2019-09-28 | Stop reason: HOSPADM

## 2019-09-28 RX ORDER — FENTANYL CITRATE 50 UG/ML
INJECTION, SOLUTION INTRAMUSCULAR; INTRAVENOUS PRN
Status: DISCONTINUED | OUTPATIENT
Start: 2019-09-28 | End: 2019-09-28 | Stop reason: SDUPTHER

## 2019-09-28 RX ORDER — SODIUM CHLORIDE 9 MG/ML
INJECTION, SOLUTION INTRAVENOUS CONTINUOUS
Status: DISCONTINUED | OUTPATIENT
Start: 2019-09-28 | End: 2019-09-29

## 2019-09-28 RX ADMIN — MORPHINE SULFATE 2 MG: 4 INJECTION, SOLUTION INTRAMUSCULAR; INTRAVENOUS at 04:23

## 2019-09-28 RX ADMIN — POTASSIUM CHLORIDE 10 MEQ: 7.46 INJECTION, SOLUTION INTRAVENOUS at 01:07

## 2019-09-28 RX ADMIN — CEFAZOLIN SODIUM 2 G: 1 INJECTION, SOLUTION INTRAVENOUS at 11:44

## 2019-09-28 RX ADMIN — SODIUM CHLORIDE, PRESERVATIVE FREE 10 ML: 5 INJECTION INTRAVENOUS at 21:58

## 2019-09-28 RX ADMIN — ONDANSETRON 4 MG: 2 INJECTION INTRAMUSCULAR; INTRAVENOUS at 11:43

## 2019-09-28 RX ADMIN — ROCURONIUM BROMIDE 10 MG: 10 INJECTION INTRAVENOUS at 12:05

## 2019-09-28 RX ADMIN — SODIUM CHLORIDE: 9 INJECTION, SOLUTION INTRAVENOUS at 10:47

## 2019-09-28 RX ADMIN — FENTANYL CITRATE 100 MCG: 50 INJECTION INTRAMUSCULAR; INTRAVENOUS at 11:39

## 2019-09-28 RX ADMIN — NADOLOL 80 MG: 20 TABLET ORAL at 10:05

## 2019-09-28 RX ADMIN — MAGNESIUM SULFATE HEPTAHYDRATE 4 G: 80 INJECTION, SOLUTION INTRAVENOUS at 14:22

## 2019-09-28 RX ADMIN — PHENYLEPHRINE HYDROCHLORIDE 100 MCG: 10 INJECTION INTRAVENOUS at 11:49

## 2019-09-28 RX ADMIN — DEXAMETHASONE SODIUM PHOSPHATE 8 MG: 4 INJECTION, SOLUTION INTRAMUSCULAR; INTRAVENOUS at 11:43

## 2019-09-28 RX ADMIN — PROPOFOL 120 MG: 10 INJECTION, EMULSION INTRAVENOUS at 11:39

## 2019-09-28 RX ADMIN — MORPHINE SULFATE 30 MG: 30 TABLET, FILM COATED, EXTENDED RELEASE ORAL at 21:58

## 2019-09-28 RX ADMIN — LIDOCAINE HYDROCHLORIDE 60 MG: 20 INJECTION, SOLUTION INTRAVENOUS at 11:39

## 2019-09-28 RX ADMIN — PHENYLEPHRINE HYDROCHLORIDE 100 MCG: 10 INJECTION INTRAVENOUS at 12:02

## 2019-09-28 RX ADMIN — SODIUM CHLORIDE: 9 INJECTION, SOLUTION INTRAVENOUS at 14:14

## 2019-09-28 RX ADMIN — FENTANYL CITRATE 50 MCG: 50 INJECTION INTRAMUSCULAR; INTRAVENOUS at 13:46

## 2019-09-28 RX ADMIN — PHENYLEPHRINE HYDROCHLORIDE 100 MCG: 10 INJECTION INTRAVENOUS at 12:31

## 2019-09-28 RX ADMIN — HYDROCODONE BITARTRATE AND ACETAMINOPHEN 1 TABLET: 7.5; 325 TABLET ORAL at 17:28

## 2019-09-28 RX ADMIN — SODIUM CHLORIDE, PRESERVATIVE FREE 10 ML: 5 INJECTION INTRAVENOUS at 00:04

## 2019-09-28 RX ADMIN — ONDANSETRON 4 MG: 2 INJECTION INTRAMUSCULAR; INTRAVENOUS at 04:29

## 2019-09-28 RX ADMIN — SODIUM CHLORIDE: 9 INJECTION, SOLUTION INTRAVENOUS at 11:34

## 2019-09-28 RX ADMIN — POTASSIUM CHLORIDE 10 MEQ: 7.46 INJECTION, SOLUTION INTRAVENOUS at 00:03

## 2019-09-28 RX ADMIN — ROCURONIUM BROMIDE 40 MG: 10 INJECTION INTRAVENOUS at 11:40

## 2019-09-28 RX ADMIN — MORPHINE SULFATE 30 MG: 30 TABLET, FILM COATED, EXTENDED RELEASE ORAL at 00:02

## 2019-09-28 RX ADMIN — CALCIUM POLYCARBOPHIL 625 MG TABLET 625 MG: at 17:28

## 2019-09-28 RX ADMIN — FENTANYL CITRATE 50 MCG: 50 INJECTION INTRAMUSCULAR; INTRAVENOUS at 14:26

## 2019-09-28 ASSESSMENT — PULMONARY FUNCTION TESTS
PIF_VALUE: 20
PIF_VALUE: 22
PIF_VALUE: 22
PIF_VALUE: 7
PIF_VALUE: 22
PIF_VALUE: 19
PIF_VALUE: 22
PIF_VALUE: 21
PIF_VALUE: 10
PIF_VALUE: 12
PIF_VALUE: 22
PIF_VALUE: 25
PIF_VALUE: 22
PIF_VALUE: 24
PIF_VALUE: 22
PIF_VALUE: 23
PIF_VALUE: 5
PIF_VALUE: 0
PIF_VALUE: 22
PIF_VALUE: 23
PIF_VALUE: 25
PIF_VALUE: 24
PIF_VALUE: 13
PIF_VALUE: 23
PIF_VALUE: 25
PIF_VALUE: 2
PIF_VALUE: 26
PIF_VALUE: 24
PIF_VALUE: 22
PIF_VALUE: 2
PIF_VALUE: 22
PIF_VALUE: 23
PIF_VALUE: 22
PIF_VALUE: 20
PIF_VALUE: 19
PIF_VALUE: 22
PIF_VALUE: 21
PIF_VALUE: 4
PIF_VALUE: 22
PIF_VALUE: 20
PIF_VALUE: 20
PIF_VALUE: 21
PIF_VALUE: 22
PIF_VALUE: 22
PIF_VALUE: 14
PIF_VALUE: 14
PIF_VALUE: 11
PIF_VALUE: 23
PIF_VALUE: 24
PIF_VALUE: 23
PIF_VALUE: 2
PIF_VALUE: 24
PIF_VALUE: 4
PIF_VALUE: 1
PIF_VALUE: 21
PIF_VALUE: 21
PIF_VALUE: 19
PIF_VALUE: 19
PIF_VALUE: 22
PIF_VALUE: 11
PIF_VALUE: 21
PIF_VALUE: 15
PIF_VALUE: 19
PIF_VALUE: 22
PIF_VALUE: 26
PIF_VALUE: 13
PIF_VALUE: 22
PIF_VALUE: 21
PIF_VALUE: 20
PIF_VALUE: 11
PIF_VALUE: 22
PIF_VALUE: 26
PIF_VALUE: 20
PIF_VALUE: 22
PIF_VALUE: 21
PIF_VALUE: 16
PIF_VALUE: 24
PIF_VALUE: 22
PIF_VALUE: 25
PIF_VALUE: 22
PIF_VALUE: 24
PIF_VALUE: 28
PIF_VALUE: 30
PIF_VALUE: 27
PIF_VALUE: 25
PIF_VALUE: 11
PIF_VALUE: 21
PIF_VALUE: 25
PIF_VALUE: 21
PIF_VALUE: 22
PIF_VALUE: 20
PIF_VALUE: 21
PIF_VALUE: 21
PIF_VALUE: 0
PIF_VALUE: 25
PIF_VALUE: 21
PIF_VALUE: 20
PIF_VALUE: 26
PIF_VALUE: 23
PIF_VALUE: 26
PIF_VALUE: 21
PIF_VALUE: 26
PIF_VALUE: 22
PIF_VALUE: 20
PIF_VALUE: 30
PIF_VALUE: 21
PIF_VALUE: 19
PIF_VALUE: 30
PIF_VALUE: 20
PIF_VALUE: 22
PIF_VALUE: 25
PIF_VALUE: 11

## 2019-09-28 ASSESSMENT — PAIN DESCRIPTION - ORIENTATION
ORIENTATION: LEFT
ORIENTATION: LEFT
ORIENTATION: LEFT;MID;RIGHT
ORIENTATION: LEFT;MID;RIGHT
ORIENTATION: LEFT
ORIENTATION: LEFT

## 2019-09-28 ASSESSMENT — PAIN DESCRIPTION - ONSET
ONSET: ON-GOING

## 2019-09-28 ASSESSMENT — PAIN SCALES - GENERAL
PAINLEVEL_OUTOF10: 8
PAINLEVEL_OUTOF10: 3
PAINLEVEL_OUTOF10: 7
PAINLEVEL_OUTOF10: 6
PAINLEVEL_OUTOF10: 0
PAINLEVEL_OUTOF10: 5
PAINLEVEL_OUTOF10: 0
PAINLEVEL_OUTOF10: 5
PAINLEVEL_OUTOF10: 8

## 2019-09-28 ASSESSMENT — PAIN - FUNCTIONAL ASSESSMENT
PAIN_FUNCTIONAL_ASSESSMENT: PREVENTS OR INTERFERES WITH MANY ACTIVE NOT PASSIVE ACTIVITIES
PAIN_FUNCTIONAL_ASSESSMENT: PREVENTS OR INTERFERES SOME ACTIVE ACTIVITIES AND ADLS
PAIN_FUNCTIONAL_ASSESSMENT: PREVENTS OR INTERFERES WITH MANY ACTIVE NOT PASSIVE ACTIVITIES

## 2019-09-28 ASSESSMENT — PAIN DESCRIPTION - DESCRIPTORS
DESCRIPTORS: DISCOMFORT
DESCRIPTORS: DISCOMFORT
DESCRIPTORS: ACHING;DISCOMFORT
DESCRIPTORS: ACHING
DESCRIPTORS: ACHING;SHARP;DISCOMFORT
DESCRIPTORS: ACHING;SHARP;DISCOMFORT

## 2019-09-28 ASSESSMENT — PAIN DESCRIPTION - LOCATION
LOCATION: HIP
LOCATION: HIP;BACK;RIB CAGE
LOCATION: HIP
LOCATION: HIP
LOCATION: BACK;HIP;RIB CAGE
LOCATION: HIP

## 2019-09-28 ASSESSMENT — PAIN DESCRIPTION - PROGRESSION
CLINICAL_PROGRESSION: GRADUALLY WORSENING
CLINICAL_PROGRESSION: NOT CHANGED

## 2019-09-28 ASSESSMENT — PAIN DESCRIPTION - FREQUENCY
FREQUENCY: CONTINUOUS

## 2019-09-28 ASSESSMENT — PAIN DESCRIPTION - PAIN TYPE
TYPE: SURGICAL PAIN
TYPE: ACUTE PAIN
TYPE: SURGICAL PAIN
TYPE: SURGICAL PAIN
TYPE: ACUTE PAIN
TYPE: SURGICAL PAIN

## 2019-09-28 NOTE — ANESTHESIA PRE PROCEDURE
ovary that ruptured and said I was bleeding internally\"/ then back age 2 ( 6) did exploratory surgery for hilario colon \"    APPENDECTOMY  age 3    BONE BIOPSY N/A 03/14/2019    IR    BREAST SURGERY Left 12/2014    Masectomy    COLONOSCOPY  03/2006    Normal exam    COLONOSCOPY  10/04/2017    mild sigmoid divertics    TUNNELED VENOUS PORT PLACEMENT      Removed 7/2016( inserted in 2015)       Social History:    Social History     Tobacco Use    Smoking status: Never Smoker    Smokeless tobacco: Never Used   Substance Use Topics    Alcohol use: Yes     Comment: per pt on 3/13/2019\"use to drink average one glass per week- none recently\"                                Counseling given: Not Answered      Vital Signs (Current):   Vitals:    09/27/19 2002 09/27/19 2101 09/27/19 2215 09/28/19 0544   BP: 128/74 124/71 131/65 (!) 120/58   Pulse: 93 90 90 99   Resp: 18 17 18 18   Temp:   36.9 °C (98.5 °F) 36.7 °C (98 °F)   TempSrc:   Oral Oral   SpO2: 98% 96%  93%   Weight:       Height:                                                  BP Readings from Last 3 Encounters:   09/28/19 (!) 120/58   09/20/19 117/70   09/05/19 119/77       NPO Status:                                                                                 BMI:   Wt Readings from Last 3 Encounters:   09/27/19 149 lb (67.6 kg)   09/20/19 146 lb (66.2 kg)   09/05/19 146 lb (66.2 kg)     Body mass index is 29.1 kg/m².     CBC:   Lab Results   Component Value Date    WBC 3.5 09/28/2019    RBC 2.71 09/28/2019    HGB 7.9 09/28/2019    HCT 25.1 09/28/2019    MCV 92.6 09/28/2019    RDW 19.0 09/28/2019    PLT 68 09/28/2019       CMP:   Lab Results   Component Value Date     09/28/2019    K 3.1 09/28/2019    CL 94 09/28/2019    CO2 31 09/28/2019    BUN 14 09/28/2019    CREATININE 0.4 09/28/2019    GFRAA >60 09/28/2019    LABGLOM >60 09/28/2019    GLUCOSE 133 09/28/2019    PROT 6.0 09/27/2019    CALCIUM 8.8 09/28/2019    BILITOT 0.7 09/27/2019    ALKPHOS

## 2019-09-29 LAB
AMORPHOUS: ABNORMAL /HPF
ANION GAP SERPL CALCULATED.3IONS-SCNC: 8 MMOL/L (ref 4–16)
ANISOCYTOSIS: ABNORMAL
BACTERIA: ABNORMAL /HPF
BANDED NEUTROPHILS ABSOLUTE COUNT: 0.35 K/CU MM
BANDED NEUTROPHILS RELATIVE PERCENT: 13 % (ref 5–11)
BILIRUBIN URINE: NEGATIVE MG/DL
BLOOD, URINE: NEGATIVE
BUN BLDV-MCNC: 18 MG/DL (ref 6–23)
CALCIUM SERPL-MCNC: 7.9 MG/DL (ref 8.3–10.6)
CHLORIDE BLD-SCNC: 99 MMOL/L (ref 99–110)
CLARITY: CLEAR
CO2: 32 MMOL/L (ref 21–32)
COLOR: YELLOW
CREAT SERPL-MCNC: 0.4 MG/DL (ref 0.6–1.1)
DIFFERENTIAL TYPE: ABNORMAL
EOSINOPHILS ABSOLUTE: 0 K/CU MM
EOSINOPHILS RELATIVE PERCENT: 1 % (ref 0–3)
FERRITIN: ABNORMAL NG/ML (ref 15–150)
FOLATE: 15.6 NG/ML (ref 3.1–17.5)
GFR AFRICAN AMERICAN: >60 ML/MIN/1.73M2
GFR NON-AFRICAN AMERICAN: >60 ML/MIN/1.73M2
GLUCOSE BLD-MCNC: 119 MG/DL (ref 70–99)
GLUCOSE, URINE: 50 MG/DL
GRANULAR CASTS: 1 /LPF
HCT VFR BLD CALC: 21.1 % (ref 37–47)
HCT VFR BLD CALC: 27.5 % (ref 37–47)
HEMOGLOBIN: 8.7 GM/DL (ref 12.5–16)
HEMOGLOBIN: ABNORMAL GM/DL (ref 12.5–16)
HYALINE CASTS: 0 /LPF
HYPOCHROMIA: ABNORMAL
IRON: 90 UG/DL (ref 37–145)
KETONES, URINE: NEGATIVE MG/DL
LEUKOCYTE ESTERASE, URINE: NEGATIVE
LYMPHOCYTES ABSOLUTE: 0.5 K/CU MM
LYMPHOCYTES RELATIVE PERCENT: 17 % (ref 24–44)
MAGNESIUM: 1.8 MG/DL (ref 1.8–2.4)
MCH RBC QN AUTO: 29.1 PG (ref 27–31)
MCHC RBC AUTO-ENTMCNC: 30.3 % (ref 32–36)
MCV RBC AUTO: 95.9 FL (ref 78–100)
METAMYELOCYTES ABSOLUTE COUNT: 0.11 K/CU MM
METAMYELOCYTES PERCENT: 4 %
MICROCYTES: ABNORMAL
MONOCYTES ABSOLUTE: 0.4 K/CU MM
MONOCYTES RELATIVE PERCENT: 14 % (ref 0–4)
MUCUS: ABNORMAL HPF
MYELOCYTE PERCENT: 1 %
MYELOCYTES ABSOLUTE COUNT: 0.03 K/CU MM
NITRITE URINE, QUANTITATIVE: NEGATIVE
NUCLEATED RED BLOOD CELLS: 31
PCT TRANSFERRIN: 61 % (ref 10–44)
PDW BLD-RTO: 19.1 % (ref 11.7–14.9)
PH, URINE: 7 (ref 5–8)
PLATELET # BLD: 56 K/CU MM (ref 140–440)
PMV BLD AUTO: 10.8 FL (ref 7.5–11.1)
POLYCHROMASIA: ABNORMAL
POTASSIUM SERPL-SCNC: 3.5 MMOL/L (ref 3.5–5.1)
PROMYELOCYTES ABSOLUTE COUNT: 0.03 K/CU MM
PROMYELOCYTES PERCENT: 1 %
PROTEIN UA: 30 MG/DL
RBC # BLD: 2.2 M/CU MM (ref 4.2–5.4)
RBC URINE: 2 /HPF (ref 0–6)
SEGMENTED NEUTROPHILS ABSOLUTE COUNT: 1.3 K/CU MM
SEGMENTED NEUTROPHILS RELATIVE PERCENT: 49 % (ref 36–66)
SODIUM BLD-SCNC: 139 MMOL/L (ref 135–145)
SPECIFIC GRAVITY UA: 1.02 (ref 1–1.03)
TOTAL IRON BINDING CAPACITY: 147 UG/DL (ref 250–450)
TRICHOMONAS: ABNORMAL /HPF
UNSATURATED IRON BINDING CAPACITY: 57 UG/DL (ref 110–370)
UROBILINOGEN, URINE: 1 MG/DL (ref 0.2–1)
VITAMIN B-12: 1621 PG/ML (ref 211–911)
VITAMIN D 25-HYDROXY: 26.53 NG/ML
WBC # BLD: 2.7 K/CU MM (ref 4–10.5)
WBC UA: ABNORMAL /HPF (ref 0–5)

## 2019-09-29 PROCEDURE — 83550 IRON BINDING TEST: CPT

## 2019-09-29 PROCEDURE — 1200000000 HC SEMI PRIVATE

## 2019-09-29 PROCEDURE — 85018 HEMOGLOBIN: CPT

## 2019-09-29 PROCEDURE — 85007 BL SMEAR W/DIFF WBC COUNT: CPT

## 2019-09-29 PROCEDURE — 81001 URINALYSIS AUTO W/SCOPE: CPT

## 2019-09-29 PROCEDURE — 6370000000 HC RX 637 (ALT 250 FOR IP): Performed by: ORTHOPAEDIC SURGERY

## 2019-09-29 PROCEDURE — 83540 ASSAY OF IRON: CPT

## 2019-09-29 PROCEDURE — 6360000002 HC RX W HCPCS: Performed by: ORTHOPAEDIC SURGERY

## 2019-09-29 PROCEDURE — 2580000003 HC RX 258: Performed by: ORTHOPAEDIC SURGERY

## 2019-09-29 PROCEDURE — 82728 ASSAY OF FERRITIN: CPT

## 2019-09-29 PROCEDURE — 97162 PT EVAL MOD COMPLEX 30 MIN: CPT

## 2019-09-29 PROCEDURE — 80048 BASIC METABOLIC PNL TOTAL CA: CPT

## 2019-09-29 PROCEDURE — 86900 BLOOD TYPING SEROLOGIC ABO: CPT

## 2019-09-29 PROCEDURE — 94761 N-INVAS EAR/PLS OXIMETRY MLT: CPT

## 2019-09-29 PROCEDURE — 85027 COMPLETE CBC AUTOMATED: CPT

## 2019-09-29 PROCEDURE — 82306 VITAMIN D 25 HYDROXY: CPT

## 2019-09-29 PROCEDURE — 86901 BLOOD TYPING SEROLOGIC RH(D): CPT

## 2019-09-29 PROCEDURE — 2700000000 HC OXYGEN THERAPY PER DAY

## 2019-09-29 PROCEDURE — 6370000000 HC RX 637 (ALT 250 FOR IP): Performed by: INTERNAL MEDICINE

## 2019-09-29 PROCEDURE — 83735 ASSAY OF MAGNESIUM: CPT

## 2019-09-29 PROCEDURE — 86850 RBC ANTIBODY SCREEN: CPT

## 2019-09-29 PROCEDURE — 87086 URINE CULTURE/COLONY COUNT: CPT

## 2019-09-29 PROCEDURE — 97530 THERAPEUTIC ACTIVITIES: CPT

## 2019-09-29 PROCEDURE — 36415 COLL VENOUS BLD VENIPUNCTURE: CPT

## 2019-09-29 PROCEDURE — 82607 VITAMIN B-12: CPT

## 2019-09-29 PROCEDURE — 2580000003 HC RX 258: Performed by: NURSE PRACTITIONER

## 2019-09-29 PROCEDURE — 82746 ASSAY OF FOLIC ACID SERUM: CPT

## 2019-09-29 PROCEDURE — 85014 HEMATOCRIT: CPT

## 2019-09-29 RX ORDER — HYDROCODONE BITARTRATE AND ACETAMINOPHEN 5; 325 MG/1; MG/1
2 TABLET ORAL EVERY 4 HOURS PRN
Status: DISCONTINUED | OUTPATIENT
Start: 2019-09-29 | End: 2019-09-29

## 2019-09-29 RX ORDER — SENNA PLUS 8.6 MG/1
2 TABLET ORAL 2 TIMES DAILY PRN
Status: DISCONTINUED | OUTPATIENT
Start: 2019-09-29 | End: 2019-10-02 | Stop reason: HOSPADM

## 2019-09-29 RX ORDER — SENNA PLUS 8.6 MG/1
1 TABLET ORAL 2 TIMES DAILY
Status: DISCONTINUED | OUTPATIENT
Start: 2019-09-29 | End: 2019-10-02 | Stop reason: HOSPADM

## 2019-09-29 RX ORDER — 0.9 % SODIUM CHLORIDE 0.9 %
250 INTRAVENOUS SOLUTION INTRAVENOUS ONCE
Status: COMPLETED | OUTPATIENT
Start: 2019-09-29 | End: 2019-09-29

## 2019-09-29 RX ORDER — HYDROCODONE BITARTRATE AND ACETAMINOPHEN 7.5; 325 MG/1; MG/1
1 TABLET ORAL EVERY 4 HOURS PRN
Status: DISCONTINUED | OUTPATIENT
Start: 2019-09-29 | End: 2019-10-02 | Stop reason: HOSPADM

## 2019-09-29 RX ORDER — HYDROCODONE BITARTRATE AND ACETAMINOPHEN 5; 325 MG/1; MG/1
1 TABLET ORAL EVERY 4 HOURS PRN
Status: DISCONTINUED | OUTPATIENT
Start: 2019-09-29 | End: 2019-09-29

## 2019-09-29 RX ORDER — POLYETHYLENE GLYCOL 3350 17 G/17G
17 POWDER, FOR SOLUTION ORAL DAILY
Status: DISCONTINUED | OUTPATIENT
Start: 2019-09-29 | End: 2019-10-02 | Stop reason: HOSPADM

## 2019-09-29 RX ADMIN — SODIUM CHLORIDE 250 ML: 9 INJECTION, SOLUTION INTRAVENOUS at 11:41

## 2019-09-29 RX ADMIN — SODIUM CHLORIDE, PRESERVATIVE FREE 10 ML: 5 INJECTION INTRAVENOUS at 22:23

## 2019-09-29 RX ADMIN — CALCIUM POLYCARBOPHIL 625 MG TABLET 625 MG: at 10:54

## 2019-09-29 RX ADMIN — CETIRIZINE HYDROCHLORIDE 10 MG: 10 TABLET, FILM COATED ORAL at 10:55

## 2019-09-29 RX ADMIN — PANTOPRAZOLE SODIUM 40 MG: 40 TABLET, DELAYED RELEASE ORAL at 06:40

## 2019-09-29 RX ADMIN — SENNOSIDES 8.6 MG: 8.6 TABLET, FILM COATED ORAL at 22:20

## 2019-09-29 RX ADMIN — DEXAMETHASONE 2 MG: 2 TABLET ORAL at 11:55

## 2019-09-29 RX ADMIN — MORPHINE SULFATE 30 MG: 30 TABLET, FILM COATED, EXTENDED RELEASE ORAL at 22:20

## 2019-09-29 RX ADMIN — CEFAZOLIN SODIUM 1 G: 1 INJECTION, SOLUTION INTRAVENOUS at 00:27

## 2019-09-29 RX ADMIN — CALCIUM POLYCARBOPHIL 625 MG TABLET 625 MG: at 18:45

## 2019-09-29 RX ADMIN — CEFAZOLIN SODIUM 1 G: 1 INJECTION, SOLUTION INTRAVENOUS at 18:50

## 2019-09-29 RX ADMIN — Medication 400 UNITS: at 10:54

## 2019-09-29 RX ADMIN — NADOLOL 80 MG: 20 TABLET ORAL at 10:55

## 2019-09-29 RX ADMIN — POLYETHYLENE GLYCOL (3350) 17 G: 17 POWDER, FOR SOLUTION ORAL at 18:44

## 2019-09-29 RX ADMIN — SENNOSIDES 8.6 MG: 8.6 TABLET, FILM COATED ORAL at 18:45

## 2019-09-29 RX ADMIN — SODIUM CHLORIDE, PRESERVATIVE FREE 10 ML: 5 INJECTION INTRAVENOUS at 11:55

## 2019-09-29 RX ADMIN — HYDROCODONE BITARTRATE AND ACETAMINOPHEN 1 TABLET: 7.5; 325 TABLET ORAL at 07:44

## 2019-09-29 RX ADMIN — OXYCODONE HYDROCHLORIDE AND ACETAMINOPHEN 500 MG: 500 TABLET ORAL at 10:54

## 2019-09-29 RX ADMIN — MORPHINE SULFATE 30 MG: 30 TABLET, FILM COATED, EXTENDED RELEASE ORAL at 10:54

## 2019-09-29 RX ADMIN — THERA TABS 1 TABLET: TAB at 11:55

## 2019-09-29 RX ADMIN — POTASSIUM CHLORIDE 20 MEQ: 1.5 POWDER, FOR SOLUTION ORAL at 10:54

## 2019-09-29 ASSESSMENT — PAIN DESCRIPTION - LOCATION
LOCATION: HIP
LOCATION: HIP

## 2019-09-29 ASSESSMENT — PAIN DESCRIPTION - PAIN TYPE
TYPE: SURGICAL PAIN
TYPE: SURGICAL PAIN

## 2019-09-29 ASSESSMENT — PAIN - FUNCTIONAL ASSESSMENT
PAIN_FUNCTIONAL_ASSESSMENT: PREVENTS OR INTERFERES SOME ACTIVE ACTIVITIES AND ADLS
PAIN_FUNCTIONAL_ASSESSMENT: PREVENTS OR INTERFERES SOME ACTIVE ACTIVITIES AND ADLS

## 2019-09-29 ASSESSMENT — PAIN DESCRIPTION - FREQUENCY
FREQUENCY: CONTINUOUS
FREQUENCY: CONTINUOUS

## 2019-09-29 ASSESSMENT — PAIN SCALES - GENERAL
PAINLEVEL_OUTOF10: 2
PAINLEVEL_OUTOF10: 5
PAINLEVEL_OUTOF10: 4
PAINLEVEL_OUTOF10: 4

## 2019-09-29 ASSESSMENT — PAIN DESCRIPTION - ONSET
ONSET: ON-GOING
ONSET: PROGRESSIVE

## 2019-09-29 ASSESSMENT — PAIN DESCRIPTION - ORIENTATION
ORIENTATION: LEFT
ORIENTATION: LEFT

## 2019-09-29 ASSESSMENT — PAIN DESCRIPTION - DESCRIPTORS
DESCRIPTORS: ACHING;DISCOMFORT
DESCRIPTORS: ACHING;DISCOMFORT

## 2019-09-29 ASSESSMENT — PAIN DESCRIPTION - PROGRESSION
CLINICAL_PROGRESSION: GRADUALLY WORSENING
CLINICAL_PROGRESSION: NOT CHANGED

## 2019-09-30 LAB
ALBUMIN SERPL-MCNC: 2.7 GM/DL (ref 3.4–5)
ALP BLD-CCNC: 315 IU/L (ref 40–129)
ALT SERPL-CCNC: 63 U/L (ref 10–40)
ANION GAP SERPL CALCULATED.3IONS-SCNC: 10 MMOL/L (ref 4–16)
ANISOCYTOSIS: ABNORMAL
AST SERPL-CCNC: 94 IU/L (ref 15–37)
BANDED NEUTROPHILS ABSOLUTE COUNT: 0.11 K/CU MM
BANDED NEUTROPHILS RELATIVE PERCENT: 3 % (ref 5–11)
BILIRUB SERPL-MCNC: 0.5 MG/DL (ref 0–1)
BILIRUBIN DIRECT: 0.3 MG/DL (ref 0–0.3)
BILIRUBIN, INDIRECT: 0.2 MG/DL (ref 0–0.7)
BUN BLDV-MCNC: 17 MG/DL (ref 6–23)
CALCIUM SERPL-MCNC: 8.8 MG/DL (ref 8.3–10.6)
CHLORIDE BLD-SCNC: 101 MMOL/L (ref 99–110)
CO2: 29 MMOL/L (ref 21–32)
CREAT SERPL-MCNC: 0.5 MG/DL (ref 0.6–1.1)
DIFFERENTIAL TYPE: ABNORMAL
GFR AFRICAN AMERICAN: >60 ML/MIN/1.73M2
GFR NON-AFRICAN AMERICAN: >60 ML/MIN/1.73M2
GLUCOSE BLD-MCNC: 203 MG/DL (ref 70–99)
HCT VFR BLD CALC: 27.5 % (ref 37–47)
HEMOGLOBIN: 8.6 GM/DL (ref 12.5–16)
LYMPHOCYTES ABSOLUTE: 0.8 K/CU MM
LYMPHOCYTES RELATIVE PERCENT: 22 % (ref 24–44)
MAGNESIUM: 1.4 MG/DL (ref 1.8–2.4)
MCH RBC QN AUTO: 29.7 PG (ref 27–31)
MCHC RBC AUTO-ENTMCNC: 31.3 % (ref 32–36)
MCV RBC AUTO: 94.8 FL (ref 78–100)
MONOCYTES ABSOLUTE: 0.4 K/CU MM
MONOCYTES RELATIVE PERCENT: 10 % (ref 0–4)
NUCLEATED RED BLOOD CELLS: 18
PDW BLD-RTO: 17.7 % (ref 11.7–14.9)
PLATELET # BLD: 58 K/CU MM (ref 140–440)
PMV BLD AUTO: 11.1 FL (ref 7.5–11.1)
POLYCHROMASIA: ABNORMAL
POTASSIUM SERPL-SCNC: 4.3 MMOL/L (ref 3.5–5.1)
RBC # BLD: 2.9 M/CU MM (ref 4.2–5.4)
SEGMENTED NEUTROPHILS ABSOLUTE COUNT: 2.2 K/CU MM
SEGMENTED NEUTROPHILS RELATIVE PERCENT: 65 % (ref 36–66)
SODIUM BLD-SCNC: 140 MMOL/L (ref 135–145)
TOTAL PROTEIN: 4.5 GM/DL (ref 6.4–8.2)
WBC # BLD: 3.5 K/CU MM (ref 4–10.5)

## 2019-09-30 PROCEDURE — 6360000002 HC RX W HCPCS: Performed by: ORTHOPAEDIC SURGERY

## 2019-09-30 PROCEDURE — 6360000002 HC RX W HCPCS: Performed by: INTERNAL MEDICINE

## 2019-09-30 PROCEDURE — 82248 BILIRUBIN DIRECT: CPT

## 2019-09-30 PROCEDURE — 6370000000 HC RX 637 (ALT 250 FOR IP): Performed by: INTERNAL MEDICINE

## 2019-09-30 PROCEDURE — 94150 VITAL CAPACITY TEST: CPT

## 2019-09-30 PROCEDURE — 2580000003 HC RX 258: Performed by: ORTHOPAEDIC SURGERY

## 2019-09-30 PROCEDURE — 97530 THERAPEUTIC ACTIVITIES: CPT

## 2019-09-30 PROCEDURE — 6370000000 HC RX 637 (ALT 250 FOR IP): Performed by: ORTHOPAEDIC SURGERY

## 2019-09-30 PROCEDURE — 36415 COLL VENOUS BLD VENIPUNCTURE: CPT

## 2019-09-30 PROCEDURE — 83735 ASSAY OF MAGNESIUM: CPT

## 2019-09-30 PROCEDURE — 92610 EVALUATE SWALLOWING FUNCTION: CPT

## 2019-09-30 PROCEDURE — 80053 COMPREHEN METABOLIC PANEL: CPT

## 2019-09-30 PROCEDURE — 1200000000 HC SEMI PRIVATE

## 2019-09-30 PROCEDURE — 85027 COMPLETE CBC AUTOMATED: CPT

## 2019-09-30 PROCEDURE — 85007 BL SMEAR W/DIFF WBC COUNT: CPT

## 2019-09-30 PROCEDURE — 97167 OT EVAL HIGH COMPLEX 60 MIN: CPT

## 2019-09-30 PROCEDURE — 97535 SELF CARE MNGMENT TRAINING: CPT

## 2019-09-30 PROCEDURE — 94761 N-INVAS EAR/PLS OXIMETRY MLT: CPT

## 2019-09-30 PROCEDURE — 97110 THERAPEUTIC EXERCISES: CPT

## 2019-09-30 RX ORDER — MAGNESIUM SULFATE 4 G/50ML
4 INJECTION INTRAVENOUS ONCE
Status: COMPLETED | OUTPATIENT
Start: 2019-09-30 | End: 2019-09-30

## 2019-09-30 RX ADMIN — Medication 400 UNITS: at 08:39

## 2019-09-30 RX ADMIN — CALCIUM POLYCARBOPHIL 625 MG TABLET 625 MG: at 18:04

## 2019-09-30 RX ADMIN — POLYETHYLENE GLYCOL (3350) 17 G: 17 POWDER, FOR SOLUTION ORAL at 08:39

## 2019-09-30 RX ADMIN — DEXAMETHASONE 4 MG: 4 TABLET ORAL at 08:38

## 2019-09-30 RX ADMIN — Medication 10 ML: at 16:13

## 2019-09-30 RX ADMIN — NADOLOL 80 MG: 20 TABLET ORAL at 08:39

## 2019-09-30 RX ADMIN — SODIUM CHLORIDE, PRESERVATIVE FREE 10 ML: 5 INJECTION INTRAVENOUS at 08:37

## 2019-09-30 RX ADMIN — SENNOSIDES 8.6 MG: 8.6 TABLET, FILM COATED ORAL at 23:08

## 2019-09-30 RX ADMIN — THERA TABS 1 TABLET: TAB at 08:38

## 2019-09-30 RX ADMIN — CETIRIZINE HYDROCHLORIDE 10 MG: 10 TABLET, FILM COATED ORAL at 08:38

## 2019-09-30 RX ADMIN — POTASSIUM CHLORIDE 20 MEQ: 1.5 POWDER, FOR SOLUTION ORAL at 08:37

## 2019-09-30 RX ADMIN — PANTOPRAZOLE SODIUM 40 MG: 40 TABLET, DELAYED RELEASE ORAL at 05:38

## 2019-09-30 RX ADMIN — SENNOSIDES 8.6 MG: 8.6 TABLET, FILM COATED ORAL at 08:38

## 2019-09-30 RX ADMIN — CALCIUM POLYCARBOPHIL 625 MG TABLET 625 MG: at 08:39

## 2019-09-30 RX ADMIN — OXYCODONE HYDROCHLORIDE AND ACETAMINOPHEN 500 MG: 500 TABLET ORAL at 08:38

## 2019-09-30 RX ADMIN — MORPHINE SULFATE 30 MG: 30 TABLET, FILM COATED, EXTENDED RELEASE ORAL at 23:07

## 2019-09-30 RX ADMIN — MORPHINE SULFATE 30 MG: 30 TABLET, FILM COATED, EXTENDED RELEASE ORAL at 08:38

## 2019-09-30 RX ADMIN — MAGNESIUM SULFATE HEPTAHYDRATE 4 G: 80 INJECTION, SOLUTION INTRAVENOUS at 16:13

## 2019-09-30 ASSESSMENT — PAIN SCALES - GENERAL
PAINLEVEL_OUTOF10: 0
PAINLEVEL_OUTOF10: 2
PAINLEVEL_OUTOF10: 2

## 2019-10-01 LAB
ABO/RH: NORMAL
ANION GAP SERPL CALCULATED.3IONS-SCNC: 9 MMOL/L (ref 4–16)
ANISOCYTOSIS: ABNORMAL
ANTIBODY SCREEN: NEGATIVE
BANDED NEUTROPHILS ABSOLUTE COUNT: 0.05 K/CU MM
BANDED NEUTROPHILS RELATIVE PERCENT: 1 % (ref 5–11)
BUN BLDV-MCNC: 20 MG/DL (ref 6–23)
CALCIUM SERPL-MCNC: 8.7 MG/DL (ref 8.3–10.6)
CHLORIDE BLD-SCNC: 96 MMOL/L (ref 99–110)
CO2: 30 MMOL/L (ref 21–32)
COMPONENT: NORMAL
CREAT SERPL-MCNC: 0.3 MG/DL (ref 0.6–1.1)
CROSSMATCH RESULT: NORMAL
CULTURE: NORMAL
DIFFERENTIAL TYPE: ABNORMAL
GFR AFRICAN AMERICAN: >60 ML/MIN/1.73M2
GFR NON-AFRICAN AMERICAN: >60 ML/MIN/1.73M2
GLUCOSE BLD-MCNC: 111 MG/DL (ref 70–99)
HCT VFR BLD CALC: 26.5 % (ref 37–47)
HEMOGLOBIN: 8.1 GM/DL (ref 12.5–16)
LYMPHOCYTES ABSOLUTE: 0.5 K/CU MM
LYMPHOCYTES RELATIVE PERCENT: 11 % (ref 24–44)
Lab: NORMAL
MAGNESIUM: 1.8 MG/DL (ref 1.8–2.4)
MCH RBC QN AUTO: 29.7 PG (ref 27–31)
MCHC RBC AUTO-ENTMCNC: 30.6 % (ref 32–36)
MCV RBC AUTO: 97.1 FL (ref 78–100)
MONOCYTES ABSOLUTE: 0.8 K/CU MM
MONOCYTES RELATIVE PERCENT: 18 % (ref 0–4)
MYELOCYTE PERCENT: 2 %
MYELOCYTES ABSOLUTE COUNT: 0.09 K/CU MM
NUCLEATED RED BLOOD CELLS: 18
PDW BLD-RTO: 17.8 % (ref 11.7–14.9)
PLATELET # BLD: 59 K/CU MM (ref 140–440)
PMV BLD AUTO: 11 FL (ref 7.5–11.1)
POLYCHROMASIA: ABNORMAL
POTASSIUM SERPL-SCNC: 4.1 MMOL/L (ref 3.5–5.1)
PROMYELOCYTES ABSOLUTE COUNT: 0.14 K/CU MM
PROMYELOCYTES PERCENT: 3 %
RBC # BLD: 2.73 M/CU MM (ref 4.2–5.4)
SEGMENTED NEUTROPHILS ABSOLUTE COUNT: 3 K/CU MM
SEGMENTED NEUTROPHILS RELATIVE PERCENT: 65 % (ref 36–66)
SODIUM BLD-SCNC: 135 MMOL/L (ref 135–145)
SPECIMEN: NORMAL
STATUS: NORMAL
TRANSFUSION STATUS: NORMAL
UNIT DIVISION: 0
UNIT NUMBER: NORMAL
WBC # BLD: 4.6 K/CU MM (ref 4–10.5)
WBC # BLD: ABNORMAL 10*3/UL

## 2019-10-01 PROCEDURE — 97530 THERAPEUTIC ACTIVITIES: CPT

## 2019-10-01 PROCEDURE — 94761 N-INVAS EAR/PLS OXIMETRY MLT: CPT

## 2019-10-01 PROCEDURE — 36415 COLL VENOUS BLD VENIPUNCTURE: CPT

## 2019-10-01 PROCEDURE — 6370000000 HC RX 637 (ALT 250 FOR IP): Performed by: ORTHOPAEDIC SURGERY

## 2019-10-01 PROCEDURE — 97110 THERAPEUTIC EXERCISES: CPT

## 2019-10-01 PROCEDURE — 83735 ASSAY OF MAGNESIUM: CPT

## 2019-10-01 PROCEDURE — 1200000000 HC SEMI PRIVATE

## 2019-10-01 PROCEDURE — 85007 BL SMEAR W/DIFF WBC COUNT: CPT

## 2019-10-01 PROCEDURE — 94150 VITAL CAPACITY TEST: CPT

## 2019-10-01 PROCEDURE — 80048 BASIC METABOLIC PNL TOTAL CA: CPT

## 2019-10-01 PROCEDURE — 2580000003 HC RX 258: Performed by: ORTHOPAEDIC SURGERY

## 2019-10-01 PROCEDURE — 6370000000 HC RX 637 (ALT 250 FOR IP): Performed by: INTERNAL MEDICINE

## 2019-10-01 PROCEDURE — 97542 WHEELCHAIR MNGMENT TRAINING: CPT

## 2019-10-01 PROCEDURE — 85027 COMPLETE CBC AUTOMATED: CPT

## 2019-10-01 RX ADMIN — SODIUM CHLORIDE, PRESERVATIVE FREE 10 ML: 5 INJECTION INTRAVENOUS at 20:09

## 2019-10-01 RX ADMIN — CALCIUM POLYCARBOPHIL 625 MG TABLET 625 MG: at 10:11

## 2019-10-01 RX ADMIN — OXYCODONE HYDROCHLORIDE AND ACETAMINOPHEN 500 MG: 500 TABLET ORAL at 10:11

## 2019-10-01 RX ADMIN — SODIUM CHLORIDE, PRESERVATIVE FREE 10 ML: 5 INJECTION INTRAVENOUS at 10:10

## 2019-10-01 RX ADMIN — POLYETHYLENE GLYCOL (3350) 17 G: 17 POWDER, FOR SOLUTION ORAL at 10:10

## 2019-10-01 RX ADMIN — POTASSIUM CHLORIDE 20 MEQ: 1.5 POWDER, FOR SOLUTION ORAL at 10:10

## 2019-10-01 RX ADMIN — Medication 400 UNITS: at 10:11

## 2019-10-01 RX ADMIN — NADOLOL 80 MG: 20 TABLET ORAL at 10:10

## 2019-10-01 RX ADMIN — MORPHINE SULFATE 30 MG: 30 TABLET, FILM COATED, EXTENDED RELEASE ORAL at 10:10

## 2019-10-01 RX ADMIN — THERA TABS 1 TABLET: TAB at 10:11

## 2019-10-01 RX ADMIN — SENNOSIDES 8.6 MG: 8.6 TABLET, FILM COATED ORAL at 10:11

## 2019-10-01 RX ADMIN — CALCIUM POLYCARBOPHIL 625 MG TABLET 625 MG: at 16:58

## 2019-10-01 RX ADMIN — CETIRIZINE HYDROCHLORIDE 10 MG: 10 TABLET, FILM COATED ORAL at 10:10

## 2019-10-01 RX ADMIN — MORPHINE SULFATE 30 MG: 30 TABLET, FILM COATED, EXTENDED RELEASE ORAL at 21:26

## 2019-10-01 RX ADMIN — PANTOPRAZOLE SODIUM 40 MG: 40 TABLET, DELAYED RELEASE ORAL at 06:16

## 2019-10-01 RX ADMIN — DEXAMETHASONE 2 MG: 2 TABLET ORAL at 10:19

## 2019-10-01 ASSESSMENT — PAIN DESCRIPTION - ONSET: ONSET: ON-GOING

## 2019-10-01 ASSESSMENT — PAIN SCALES - GENERAL
PAINLEVEL_OUTOF10: 4
PAINLEVEL_OUTOF10: 4

## 2019-10-01 ASSESSMENT — PAIN DESCRIPTION - LOCATION: LOCATION: HIP

## 2019-10-01 ASSESSMENT — PAIN DESCRIPTION - DESCRIPTORS: DESCRIPTORS: ACHING

## 2019-10-01 ASSESSMENT — PAIN DESCRIPTION - PROGRESSION: CLINICAL_PROGRESSION: NOT CHANGED

## 2019-10-01 ASSESSMENT — PAIN DESCRIPTION - ORIENTATION: ORIENTATION: LEFT

## 2019-10-01 ASSESSMENT — PAIN DESCRIPTION - FREQUENCY: FREQUENCY: CONTINUOUS

## 2019-10-01 ASSESSMENT — PAIN DESCRIPTION - DIRECTION: RADIATING_TOWARDS: LEFT HIP

## 2019-10-01 ASSESSMENT — PAIN DESCRIPTION - PAIN TYPE: TYPE: SURGICAL PAIN

## 2019-10-02 VITALS
WEIGHT: 149 LBS | TEMPERATURE: 99.2 F | SYSTOLIC BLOOD PRESSURE: 143 MMHG | HEART RATE: 105 BPM | BODY MASS INDEX: 29.25 KG/M2 | RESPIRATION RATE: 18 BRPM | DIASTOLIC BLOOD PRESSURE: 72 MMHG | OXYGEN SATURATION: 97 % | HEIGHT: 60 IN

## 2019-10-02 PROBLEM — S72.045A NONDISPLACED FRACTURE OF BASE OF NECK OF LEFT FEMUR, INITIAL ENCOUNTER FOR CLOSED FRACTURE (HCC): Status: RESOLVED | Noted: 2019-09-27 | Resolved: 2019-10-02

## 2019-10-02 LAB
ANION GAP SERPL CALCULATED.3IONS-SCNC: 13 MMOL/L (ref 4–16)
ANISOCYTOSIS: ABNORMAL
BANDED NEUTROPHILS ABSOLUTE COUNT: 0.09 K/CU MM
BANDED NEUTROPHILS RELATIVE PERCENT: 2 % (ref 5–11)
BUN BLDV-MCNC: 21 MG/DL (ref 6–23)
CALCIUM SERPL-MCNC: 8.8 MG/DL (ref 8.3–10.6)
CHLORIDE BLD-SCNC: 103 MMOL/L (ref 99–110)
CO2: 24 MMOL/L (ref 21–32)
CREAT SERPL-MCNC: 0.4 MG/DL (ref 0.6–1.1)
DIFFERENTIAL TYPE: ABNORMAL
GFR AFRICAN AMERICAN: >60 ML/MIN/1.73M2
GFR NON-AFRICAN AMERICAN: >60 ML/MIN/1.73M2
GLUCOSE BLD-MCNC: 97 MG/DL (ref 70–99)
HCT VFR BLD CALC: 24.9 % (ref 37–47)
HEMOGLOBIN: 7.5 GM/DL (ref 12.5–16)
LYMPHOCYTES ABSOLUTE: 0.8 K/CU MM
LYMPHOCYTES RELATIVE PERCENT: 17 % (ref 24–44)
MAGNESIUM: 1.6 MG/DL (ref 1.8–2.4)
MCH RBC QN AUTO: 29.9 PG (ref 27–31)
MCHC RBC AUTO-ENTMCNC: 30.1 % (ref 32–36)
MCV RBC AUTO: 99.2 FL (ref 78–100)
MONOCYTES ABSOLUTE: 0.4 K/CU MM
MONOCYTES RELATIVE PERCENT: 9 % (ref 0–4)
NUCLEATED RED BLOOD CELLS: 24
PDW BLD-RTO: 17.9 % (ref 11.7–14.9)
PLATELET # BLD: 54 K/CU MM (ref 140–440)
PMV BLD AUTO: 12 FL (ref 7.5–11.1)
POLYCHROMASIA: ABNORMAL
POTASSIUM SERPL-SCNC: 4.7 MMOL/L (ref 3.5–5.1)
PROMYELOCYTES ABSOLUTE COUNT: 0.05 K/CU MM
PROMYELOCYTES PERCENT: 1 %
RBC # BLD: 2.51 M/CU MM (ref 4.2–5.4)
SEGMENTED NEUTROPHILS ABSOLUTE COUNT: 3.4 K/CU MM
SEGMENTED NEUTROPHILS RELATIVE PERCENT: 71 % (ref 36–66)
SODIUM BLD-SCNC: 140 MMOL/L (ref 135–145)
WBC # BLD: 4.7 K/CU MM (ref 4–10.5)

## 2019-10-02 PROCEDURE — 6370000000 HC RX 637 (ALT 250 FOR IP): Performed by: INTERNAL MEDICINE

## 2019-10-02 PROCEDURE — 86900 BLOOD TYPING SEROLOGIC ABO: CPT

## 2019-10-02 PROCEDURE — 97116 GAIT TRAINING THERAPY: CPT

## 2019-10-02 PROCEDURE — 97530 THERAPEUTIC ACTIVITIES: CPT

## 2019-10-02 PROCEDURE — 2580000003 HC RX 258: Performed by: ORTHOPAEDIC SURGERY

## 2019-10-02 PROCEDURE — 36415 COLL VENOUS BLD VENIPUNCTURE: CPT

## 2019-10-02 PROCEDURE — 83735 ASSAY OF MAGNESIUM: CPT

## 2019-10-02 PROCEDURE — 85027 COMPLETE CBC AUTOMATED: CPT

## 2019-10-02 PROCEDURE — 86850 RBC ANTIBODY SCREEN: CPT

## 2019-10-02 PROCEDURE — P9016 RBC LEUKOCYTES REDUCED: HCPCS

## 2019-10-02 PROCEDURE — 86901 BLOOD TYPING SEROLOGIC RH(D): CPT

## 2019-10-02 PROCEDURE — 80048 BASIC METABOLIC PNL TOTAL CA: CPT

## 2019-10-02 PROCEDURE — 6360000002 HC RX W HCPCS: Performed by: ORTHOPAEDIC SURGERY

## 2019-10-02 PROCEDURE — 36430 TRANSFUSION BLD/BLD COMPNT: CPT

## 2019-10-02 PROCEDURE — 85007 BL SMEAR W/DIFF WBC COUNT: CPT

## 2019-10-02 PROCEDURE — 6370000000 HC RX 637 (ALT 250 FOR IP): Performed by: ORTHOPAEDIC SURGERY

## 2019-10-02 PROCEDURE — 86922 COMPATIBILITY TEST ANTIGLOB: CPT

## 2019-10-02 RX ORDER — HYDROCODONE BITARTRATE AND ACETAMINOPHEN 7.5; 325 MG/1; MG/1
1 TABLET ORAL EVERY 6 HOURS PRN
Qty: 14 TABLET | Refills: 0 | Status: SHIPPED | OUTPATIENT
Start: 2019-10-02 | End: 2019-10-09

## 2019-10-02 RX ORDER — MORPHINE SULFATE 30 MG/1
30 CAPSULE, EXTENDED RELEASE ORAL 2 TIMES DAILY
Qty: 60 CAPSULE | Refills: 0 | Status: SHIPPED | OUTPATIENT
Start: 2019-10-02 | End: 2019-11-01

## 2019-10-02 RX ORDER — 0.9 % SODIUM CHLORIDE 0.9 %
250 INTRAVENOUS SOLUTION INTRAVENOUS ONCE
Status: DISCONTINUED | OUTPATIENT
Start: 2019-10-02 | End: 2019-10-02 | Stop reason: HOSPADM

## 2019-10-02 RX ADMIN — MORPHINE SULFATE 30 MG: 30 TABLET, FILM COATED, EXTENDED RELEASE ORAL at 10:29

## 2019-10-02 RX ADMIN — THERA TABS 1 TABLET: TAB at 10:34

## 2019-10-02 RX ADMIN — POTASSIUM CHLORIDE 20 MEQ: 1.5 POWDER, FOR SOLUTION ORAL at 10:29

## 2019-10-02 RX ADMIN — NADOLOL 80 MG: 20 TABLET ORAL at 10:29

## 2019-10-02 RX ADMIN — PANTOPRAZOLE SODIUM 40 MG: 40 TABLET, DELAYED RELEASE ORAL at 06:43

## 2019-10-02 RX ADMIN — SODIUM CHLORIDE, PRESERVATIVE FREE 10 ML: 5 INJECTION INTRAVENOUS at 10:56

## 2019-10-02 RX ADMIN — DEXAMETHASONE 4 MG: 4 TABLET ORAL at 10:30

## 2019-10-02 RX ADMIN — CETIRIZINE HYDROCHLORIDE 10 MG: 10 TABLET, FILM COATED ORAL at 10:30

## 2019-10-02 RX ADMIN — POLYETHYLENE GLYCOL (3350) 17 G: 17 POWDER, FOR SOLUTION ORAL at 10:29

## 2019-10-02 RX ADMIN — SENNOSIDES 8.6 MG: 8.6 TABLET, FILM COATED ORAL at 10:31

## 2019-10-02 RX ADMIN — CALCIUM POLYCARBOPHIL 625 MG TABLET 625 MG: at 10:30

## 2019-10-02 RX ADMIN — Medication 400 UNITS: at 10:56

## 2019-10-02 RX ADMIN — OXYCODONE HYDROCHLORIDE AND ACETAMINOPHEN 500 MG: 500 TABLET ORAL at 10:29

## 2019-10-02 RX ADMIN — MORPHINE SULFATE 2 MG: 4 INJECTION, SOLUTION INTRAMUSCULAR; INTRAVENOUS at 08:36

## 2019-10-02 ASSESSMENT — PAIN DESCRIPTION - PAIN TYPE
TYPE: SURGICAL PAIN

## 2019-10-02 ASSESSMENT — PAIN DESCRIPTION - LOCATION
LOCATION: HIP

## 2019-10-02 ASSESSMENT — PAIN DESCRIPTION - ONSET
ONSET: ON-GOING

## 2019-10-02 ASSESSMENT — PAIN DESCRIPTION - DESCRIPTORS
DESCRIPTORS: ACHING
DESCRIPTORS: CONSTANT;ACHING
DESCRIPTORS: CONSTANT;ACHING

## 2019-10-02 ASSESSMENT — PAIN SCALES - GENERAL
PAINLEVEL_OUTOF10: 3
PAINLEVEL_OUTOF10: 2
PAINLEVEL_OUTOF10: 4
PAINLEVEL_OUTOF10: 4

## 2019-10-02 ASSESSMENT — PAIN DESCRIPTION - ORIENTATION
ORIENTATION: LEFT

## 2019-10-02 ASSESSMENT — PAIN DESCRIPTION - FREQUENCY
FREQUENCY: INTERMITTENT

## 2019-10-02 ASSESSMENT — PAIN DESCRIPTION - PROGRESSION
CLINICAL_PROGRESSION: NOT CHANGED
CLINICAL_PROGRESSION: NOT CHANGED

## 2019-10-02 ASSESSMENT — PAIN DESCRIPTION - DIRECTION: RADIATING_TOWARDS: LEFT HIP

## 2019-10-07 ENCOUNTER — HOSPITAL ENCOUNTER (OUTPATIENT)
Age: 69
Setting detail: SPECIMEN
Discharge: HOME | End: 2019-10-07
Payer: MEDICARE

## 2019-10-07 LAB
ANION GAP SERPL CALCULATED.3IONS-SCNC: 13 MMOL/L (ref 4–16)
BUN BLDV-MCNC: 23 MG/DL (ref 6–23)
CALCIUM SERPL-MCNC: 8.3 MG/DL (ref 8.3–10.6)
CHLORIDE BLD-SCNC: 94 MMOL/L (ref 99–110)
CO2: 30 MMOL/L (ref 21–32)
CREAT SERPL-MCNC: 0.4 MG/DL (ref 0.6–1.1)
GFR AFRICAN AMERICAN: >60 ML/MIN/1.73M2
GFR NON-AFRICAN AMERICAN: >60 ML/MIN/1.73M2
GLUCOSE BLD-MCNC: 165 MG/DL (ref 70–99)
HCT VFR BLD CALC: 32.5 % (ref 37–47)
HEMOGLOBIN: 10.3 GM/DL (ref 12.5–16)
MCH RBC QN AUTO: 29.3 PG (ref 27–31)
MCHC RBC AUTO-ENTMCNC: 31.7 % (ref 32–36)
MCV RBC AUTO: 92.3 FL (ref 78–100)
PDW BLD-RTO: 18.5 % (ref 11.7–14.9)
PLATELET # BLD: 43 K/CU MM (ref 140–440)
PMV BLD AUTO: 10.1 FL (ref 7.5–11.1)
POTASSIUM SERPL-SCNC: 3.4 MMOL/L (ref 3.5–5.1)
RBC # BLD: 3.52 M/CU MM (ref 4.2–5.4)
SODIUM BLD-SCNC: 137 MMOL/L (ref 135–145)
WBC # BLD: 10.2 K/CU MM (ref 4–10.5)

## 2019-10-07 PROCEDURE — 80048 BASIC METABOLIC PNL TOTAL CA: CPT

## 2019-10-07 PROCEDURE — 85027 COMPLETE CBC AUTOMATED: CPT

## 2019-10-07 PROCEDURE — 36415 COLL VENOUS BLD VENIPUNCTURE: CPT

## 2019-10-11 ENCOUNTER — HOSPITAL ENCOUNTER (OUTPATIENT)
Age: 69
Discharge: HOME | End: 2019-10-11

## 2019-10-11 LAB
ANION GAP SERPL CALCULATED.3IONS-SCNC: 12 MMOL/L (ref 4–16)
BUN BLDV-MCNC: 24 MG/DL (ref 6–23)
CALCIUM SERPL-MCNC: 8.6 MG/DL (ref 8.3–10.6)
CHLORIDE BLD-SCNC: 95 MMOL/L (ref 99–110)
CO2: 31 MMOL/L (ref 21–32)
CREAT SERPL-MCNC: 0.5 MG/DL (ref 0.6–1.1)
GFR AFRICAN AMERICAN: >60 ML/MIN/1.73M2
GFR NON-AFRICAN AMERICAN: >60 ML/MIN/1.73M2
GLUCOSE BLD-MCNC: 90 MG/DL (ref 70–99)
POTASSIUM SERPL-SCNC: 3.5 MMOL/L (ref 3.5–5.1)
SODIUM BLD-SCNC: 138 MMOL/L (ref 135–145)

## 2019-10-11 PROCEDURE — 36415 COLL VENOUS BLD VENIPUNCTURE: CPT

## 2019-10-11 PROCEDURE — 80048 BASIC METABOLIC PNL TOTAL CA: CPT

## 2019-10-15 ENCOUNTER — HOSPITAL ENCOUNTER (OUTPATIENT)
Age: 69
Discharge: HOME | End: 2019-10-15

## 2019-10-15 LAB
ANION GAP SERPL CALCULATED.3IONS-SCNC: 12 MMOL/L (ref 4–16)
BUN BLDV-MCNC: 25 MG/DL (ref 6–23)
CALCIUM SERPL-MCNC: 8.4 MG/DL (ref 8.3–10.6)
CHLORIDE BLD-SCNC: 101 MMOL/L (ref 99–110)
CO2: 29 MMOL/L (ref 21–32)
CREAT SERPL-MCNC: 0.4 MG/DL (ref 0.6–1.1)
GFR AFRICAN AMERICAN: >60 ML/MIN/1.73M2
GFR NON-AFRICAN AMERICAN: >60 ML/MIN/1.73M2
GLUCOSE BLD-MCNC: 117 MG/DL (ref 70–99)
HCT VFR BLD CALC: 26.9 % (ref 37–47)
HEMOGLOBIN: 8.1 GM/DL (ref 12.5–16)
MCH RBC QN AUTO: 28.9 PG (ref 27–31)
MCHC RBC AUTO-ENTMCNC: 30.1 % (ref 32–36)
MCV RBC AUTO: 96.1 FL (ref 78–100)
PDW BLD-RTO: 19.8 % (ref 11.7–14.9)
PLATELET # BLD: 42 K/CU MM (ref 140–440)
PMV BLD AUTO: 12.6 FL (ref 7.5–11.1)
POTASSIUM SERPL-SCNC: 3.7 MMOL/L (ref 3.5–5.1)
RBC # BLD: 2.8 M/CU MM (ref 4.2–5.4)
SODIUM BLD-SCNC: 142 MMOL/L (ref 135–145)
WBC # BLD: 8.1 K/CU MM (ref 4–10.5)

## 2019-10-15 PROCEDURE — 80048 BASIC METABOLIC PNL TOTAL CA: CPT

## 2019-10-15 PROCEDURE — 85027 COMPLETE CBC AUTOMATED: CPT

## 2019-10-15 PROCEDURE — 36415 COLL VENOUS BLD VENIPUNCTURE: CPT

## 2019-10-17 ENCOUNTER — HOSPITAL ENCOUNTER (OUTPATIENT)
Dept: INFUSION THERAPY | Age: 69
Setting detail: INFUSION SERIES
Discharge: HOME | End: 2019-10-17
Payer: COMMERCIAL

## 2019-10-17 ENCOUNTER — HOSPITAL ENCOUNTER (OUTPATIENT)
Age: 69
Discharge: HOME | End: 2019-10-17

## 2019-10-17 VITALS
DIASTOLIC BLOOD PRESSURE: 70 MMHG | RESPIRATION RATE: 14 BRPM | SYSTOLIC BLOOD PRESSURE: 127 MMHG | HEART RATE: 100 BPM | TEMPERATURE: 97.7 F | OXYGEN SATURATION: 95 %

## 2019-10-17 PROCEDURE — 86901 BLOOD TYPING SEROLOGIC RH(D): CPT

## 2019-10-17 PROCEDURE — 6370000000 HC RX 637 (ALT 250 FOR IP): Performed by: NURSE PRACTITIONER

## 2019-10-17 PROCEDURE — 99211 OFF/OP EST MAY X REQ PHY/QHP: CPT

## 2019-10-17 PROCEDURE — 86850 RBC ANTIBODY SCREEN: CPT

## 2019-10-17 PROCEDURE — 86922 COMPATIBILITY TEST ANTIGLOB: CPT

## 2019-10-17 PROCEDURE — 2580000003 HC RX 258: Performed by: NURSE PRACTITIONER

## 2019-10-17 PROCEDURE — 96374 THER/PROPH/DIAG INJ IV PUSH: CPT

## 2019-10-17 PROCEDURE — P9016 RBC LEUKOCYTES REDUCED: HCPCS

## 2019-10-17 PROCEDURE — 86900 BLOOD TYPING SEROLOGIC ABO: CPT

## 2019-10-17 PROCEDURE — 36415 COLL VENOUS BLD VENIPUNCTURE: CPT

## 2019-10-17 PROCEDURE — 6360000002 HC RX W HCPCS: Performed by: NURSE PRACTITIONER

## 2019-10-17 PROCEDURE — 36430 TRANSFUSION BLD/BLD COMPNT: CPT

## 2019-10-17 RX ORDER — 0.9 % SODIUM CHLORIDE 0.9 %
250 INTRAVENOUS SOLUTION INTRAVENOUS ONCE
Status: COMPLETED | OUTPATIENT
Start: 2019-10-17 | End: 2019-10-17

## 2019-10-17 RX ORDER — HEPARIN SODIUM (PORCINE) LOCK FLUSH IV SOLN 100 UNIT/ML 100 UNIT/ML
500 SOLUTION INTRAVENOUS PRN
Status: DISCONTINUED | OUTPATIENT
Start: 2019-10-17 | End: 2019-10-18 | Stop reason: HOSPADM

## 2019-10-17 RX ORDER — POLYETHYLENE GLYCOL 1450
17 POWDER (GRAM) MISCELLANEOUS 2 TIMES DAILY
Status: ON HOLD | COMMUNITY
End: 2019-11-08 | Stop reason: HOSPADM

## 2019-10-17 RX ORDER — DIPHENHYDRAMINE HCL 25 MG
25 TABLET ORAL SEE ADMIN INSTRUCTIONS
Status: DISCONTINUED | OUTPATIENT
Start: 2019-10-17 | End: 2019-10-18 | Stop reason: HOSPADM

## 2019-10-17 RX ORDER — ACETAMINOPHEN 325 MG/1
650 TABLET ORAL SEE ADMIN INSTRUCTIONS
Status: COMPLETED | OUTPATIENT
Start: 2019-10-17 | End: 2019-10-17

## 2019-10-17 RX ORDER — FUROSEMIDE 10 MG/ML
20 INJECTION INTRAMUSCULAR; INTRAVENOUS SEE ADMIN INSTRUCTIONS
Status: DISCONTINUED | OUTPATIENT
Start: 2019-10-17 | End: 2019-10-18 | Stop reason: HOSPADM

## 2019-10-17 RX ORDER — METHYLPREDNISOLONE SODIUM SUCCINATE 40 MG/ML
20 INJECTION, POWDER, LYOPHILIZED, FOR SOLUTION INTRAMUSCULAR; INTRAVENOUS ONCE
Status: COMPLETED | OUTPATIENT
Start: 2019-10-17 | End: 2019-10-17

## 2019-10-17 RX ORDER — HYDROCODONE BITARTRATE AND ACETAMINOPHEN 7.5; 325 MG/1; MG/1
1 TABLET ORAL EVERY 6 HOURS PRN
Status: ON HOLD | COMMUNITY
End: 2019-11-08 | Stop reason: SDUPTHER

## 2019-10-17 RX ORDER — DEXAMETHASONE 2 MG/1
1 TABLET ORAL EVERY OTHER DAY
COMMUNITY
End: 2020-02-28 | Stop reason: ALTCHOICE

## 2019-10-17 RX ORDER — ONDANSETRON HYDROCHLORIDE 8 MG/1
8 TABLET, FILM COATED ORAL DAILY
COMMUNITY
End: 2020-01-03 | Stop reason: ALTCHOICE

## 2019-10-17 RX ORDER — SODIUM CHLORIDE 0.9 % (FLUSH) 0.9 %
10 SYRINGE (ML) INJECTION PRN
Status: DISCONTINUED | OUTPATIENT
Start: 2019-10-17 | End: 2019-10-18 | Stop reason: HOSPADM

## 2019-10-17 RX ADMIN — ACETAMINOPHEN 650 MG: 325 TABLET ORAL at 08:58

## 2019-10-17 RX ADMIN — Medication 500 UNITS: at 11:54

## 2019-10-17 RX ADMIN — METHYLPREDNISOLONE SODIUM SUCCINATE 20 MG: 40 INJECTION, POWDER, FOR SOLUTION INTRAMUSCULAR; INTRAVENOUS at 08:59

## 2019-10-17 RX ADMIN — Medication 10 ML: at 11:54

## 2019-10-17 RX ADMIN — SODIUM CHLORIDE 250 ML: 9 INJECTION, SOLUTION INTRAVENOUS at 08:58

## 2019-10-17 ASSESSMENT — PAIN SCALES - GENERAL
PAINLEVEL_OUTOF10: 1
PAINLEVEL_OUTOF10: 0

## 2019-10-17 ASSESSMENT — PAIN DESCRIPTION - PAIN TYPE: TYPE: CHRONIC PAIN

## 2019-10-22 ENCOUNTER — HOSPITAL ENCOUNTER (OUTPATIENT)
Age: 69
Discharge: HOME | End: 2019-10-22

## 2019-10-22 LAB
ANION GAP SERPL CALCULATED.3IONS-SCNC: 13 MMOL/L (ref 4–16)
BUN BLDV-MCNC: 24 MG/DL (ref 6–23)
CALCIUM SERPL-MCNC: 8.5 MG/DL (ref 8.3–10.6)
CHLORIDE BLD-SCNC: 96 MMOL/L (ref 99–110)
CO2: 30 MMOL/L (ref 21–32)
CREAT SERPL-MCNC: 0.5 MG/DL (ref 0.6–1.1)
GFR AFRICAN AMERICAN: >60 ML/MIN/1.73M2
GFR NON-AFRICAN AMERICAN: >60 ML/MIN/1.73M2
GLUCOSE BLD-MCNC: 82 MG/DL (ref 70–99)
HCT VFR BLD CALC: 33.2 % (ref 37–47)
HEMOGLOBIN: 10.1 GM/DL (ref 12.5–16)
MCH RBC QN AUTO: 29.3 PG (ref 27–31)
MCHC RBC AUTO-ENTMCNC: 30.4 % (ref 32–36)
MCV RBC AUTO: 96.2 FL (ref 78–100)
PDW BLD-RTO: 19.4 % (ref 11.7–14.9)
PLATELET # BLD: 57 K/CU MM (ref 140–440)
POTASSIUM SERPL-SCNC: 3.3 MMOL/L (ref 3.5–5.1)
RBC # BLD: 3.45 M/CU MM (ref 4.2–5.4)
SODIUM BLD-SCNC: 139 MMOL/L (ref 135–145)
WBC # BLD: 7.5 K/CU MM (ref 4–10.5)

## 2019-10-22 PROCEDURE — 80048 BASIC METABOLIC PNL TOTAL CA: CPT

## 2019-10-22 PROCEDURE — 36415 COLL VENOUS BLD VENIPUNCTURE: CPT

## 2019-10-22 PROCEDURE — 85027 COMPLETE CBC AUTOMATED: CPT

## 2019-10-25 ENCOUNTER — HOSPITAL ENCOUNTER (OUTPATIENT)
Age: 69
Setting detail: SPECIMEN
Discharge: HOME | End: 2019-10-25

## 2019-10-25 LAB
ANION GAP SERPL CALCULATED.3IONS-SCNC: 8 MMOL/L (ref 4–16)
BUN BLDV-MCNC: 22 MG/DL (ref 6–23)
CALCIUM SERPL-MCNC: 8.8 MG/DL (ref 8.3–10.6)
CHLORIDE BLD-SCNC: 102 MMOL/L (ref 99–110)
CO2: 32 MMOL/L (ref 21–32)
CREAT SERPL-MCNC: 0.4 MG/DL (ref 0.6–1.1)
GFR AFRICAN AMERICAN: >60 ML/MIN/1.73M2
GFR NON-AFRICAN AMERICAN: >60 ML/MIN/1.73M2
GLUCOSE BLD-MCNC: 87 MG/DL (ref 70–99)
POTASSIUM SERPL-SCNC: 4.6 MMOL/L (ref 3.5–5.1)
SODIUM BLD-SCNC: 142 MMOL/L (ref 135–145)

## 2019-10-25 PROCEDURE — 80048 BASIC METABOLIC PNL TOTAL CA: CPT

## 2019-10-25 PROCEDURE — 36415 COLL VENOUS BLD VENIPUNCTURE: CPT

## 2019-10-29 ENCOUNTER — HOSPITAL ENCOUNTER (OUTPATIENT)
Age: 69
Discharge: HOME | End: 2019-10-29

## 2019-10-29 LAB
ANION GAP SERPL CALCULATED.3IONS-SCNC: 7 MMOL/L (ref 4–16)
BUN BLDV-MCNC: 25 MG/DL (ref 6–23)
CALCIUM SERPL-MCNC: 8.7 MG/DL (ref 8.3–10.6)
CHLORIDE BLD-SCNC: 96 MMOL/L (ref 99–110)
CO2: 33 MMOL/L (ref 21–32)
CREAT SERPL-MCNC: 0.4 MG/DL (ref 0.6–1.1)
GFR AFRICAN AMERICAN: >60 ML/MIN/1.73M2
GFR NON-AFRICAN AMERICAN: >60 ML/MIN/1.73M2
GLUCOSE BLD-MCNC: 80 MG/DL (ref 70–99)
HCT VFR BLD CALC: 27.5 % (ref 37–47)
HEMOGLOBIN: 8.3 GM/DL (ref 12.5–16)
MCH RBC QN AUTO: 29.5 PG (ref 27–31)
MCHC RBC AUTO-ENTMCNC: 30.2 % (ref 32–36)
MCV RBC AUTO: 97.9 FL (ref 78–100)
PDW BLD-RTO: 20.2 % (ref 11.7–14.9)
PLATELET # BLD: ABNORMAL K/CU MM (ref 140–440)
POTASSIUM SERPL-SCNC: 3.6 MMOL/L (ref 3.5–5.1)
RBC # BLD: 2.81 M/CU MM (ref 4.2–5.4)
SODIUM BLD-SCNC: 136 MMOL/L (ref 135–145)
WBC # BLD: 6.5 K/CU MM (ref 4–10.5)

## 2019-10-29 PROCEDURE — 80048 BASIC METABOLIC PNL TOTAL CA: CPT

## 2019-10-29 PROCEDURE — 36415 COLL VENOUS BLD VENIPUNCTURE: CPT

## 2019-10-29 PROCEDURE — 85027 COMPLETE CBC AUTOMATED: CPT

## 2019-11-03 ENCOUNTER — HOSPITAL ENCOUNTER (INPATIENT)
Age: 69
LOS: 5 days | Discharge: NURSING, REHAB OR LONG-TERM CARE | DRG: 470 | End: 2019-11-08
Attending: EMERGENCY MEDICINE | Admitting: INTERNAL MEDICINE
Payer: MEDICARE

## 2019-11-03 ENCOUNTER — APPOINTMENT (OUTPATIENT)
Dept: GENERAL RADIOLOGY | Age: 69
DRG: 470 | End: 2019-11-03
Payer: MEDICARE

## 2019-11-03 DIAGNOSIS — S72.001A CLOSED FRACTURE OF NECK OF RIGHT FEMUR, INITIAL ENCOUNTER (HCC): ICD-10-CM

## 2019-11-03 DIAGNOSIS — C79.9 METASTATIC CANCER (HCC): Primary | ICD-10-CM

## 2019-11-03 PROBLEM — M84.459A: Status: ACTIVE | Noted: 2019-11-03

## 2019-11-03 LAB
ALBUMIN SERPL-MCNC: 2.6 GM/DL (ref 3.4–5)
ALP BLD-CCNC: 725 IU/L (ref 40–129)
ALT SERPL-CCNC: 162 U/L (ref 10–40)
ANION GAP SERPL CALCULATED.3IONS-SCNC: 9 MMOL/L (ref 4–16)
APTT: 26.5 SECONDS (ref 25.1–37.1)
AST SERPL-CCNC: 263 IU/L (ref 15–37)
ATYPICAL LYMPHOCYTE ABSOLUTE COUNT: ABNORMAL
BACTERIA: ABNORMAL /HPF
BANDED NEUTROPHILS ABSOLUTE COUNT: 1.56 K/CU MM
BANDED NEUTROPHILS RELATIVE PERCENT: 13 % (ref 5–11)
BILIRUB SERPL-MCNC: 2.1 MG/DL (ref 0–1)
BILIRUBIN URINE: NEGATIVE MG/DL
BLOOD, URINE: NEGATIVE
BUN BLDV-MCNC: 28 MG/DL (ref 6–23)
CALCIUM SERPL-MCNC: 7.9 MG/DL (ref 8.3–10.6)
CHLORIDE BLD-SCNC: 95 MMOL/L (ref 99–110)
CLARITY: CLEAR
CO2: 32 MMOL/L (ref 21–32)
COLOR: YELLOW
CREAT SERPL-MCNC: 0.5 MG/DL (ref 0.6–1.1)
DIFFERENTIAL TYPE: ABNORMAL
EOSINOPHILS ABSOLUTE: 0.1 K/CU MM
EOSINOPHILS RELATIVE PERCENT: 1 % (ref 0–3)
GFR AFRICAN AMERICAN: >60 ML/MIN/1.73M2
GFR NON-AFRICAN AMERICAN: >60 ML/MIN/1.73M2
GIANT PLATELETS: ABNORMAL
GLUCOSE BLD-MCNC: 242 MG/DL (ref 70–99)
GLUCOSE, URINE: NEGATIVE MG/DL
HCT VFR BLD CALC: 29.6 % (ref 37–47)
HEMOGLOBIN: 9.1 GM/DL (ref 12.5–16)
HYALINE CASTS: 10 /LPF
HYPOCHROMIA: ABNORMAL
INR BLD: 1.48 INDEX
KETONES, URINE: NEGATIVE MG/DL
LEUKOCYTE ESTERASE, URINE: NEGATIVE
LYMPHOCYTES ABSOLUTE: 2.3 K/CU MM
LYMPHOCYTES RELATIVE PERCENT: 19 % (ref 24–44)
MAGNESIUM: 1.8 MG/DL (ref 1.8–2.4)
MCH RBC QN AUTO: 29.7 PG (ref 27–31)
MCHC RBC AUTO-ENTMCNC: 30.7 % (ref 32–36)
MCV RBC AUTO: 96.7 FL (ref 78–100)
METAMYELOCYTES ABSOLUTE COUNT: 0.12 K/CU MM
METAMYELOCYTES PERCENT: 1 %
MONOCYTES ABSOLUTE: 0.7 K/CU MM
MONOCYTES RELATIVE PERCENT: 6 % (ref 0–4)
MUCUS: ABNORMAL HPF
MYELOCYTE PERCENT: 2 %
MYELOCYTES ABSOLUTE COUNT: 0.24 K/CU MM
NITRITE URINE, QUANTITATIVE: NEGATIVE
NON SQUAM EPI CELLS: 1 /HPF
NUCLEATED RED BLOOD CELLS: 24
PDW BLD-RTO: 22.6 % (ref 11.7–14.9)
PH, URINE: 6 (ref 5–8)
PLATELET # BLD: 54 K/CU MM (ref 140–440)
POTASSIUM SERPL-SCNC: 3.1 MMOL/L (ref 3.5–5.1)
PROTEIN UA: NEGATIVE MG/DL
PROTHROMBIN TIME: 17.2 SECONDS (ref 11.7–14.5)
RBC # BLD: 3.06 M/CU MM (ref 4.2–5.4)
RBC URINE: <1 /HPF (ref 0–6)
SEGMENTED NEUTROPHILS ABSOLUTE COUNT: 7 K/CU MM
SEGMENTED NEUTROPHILS RELATIVE PERCENT: 58 % (ref 36–66)
SODIUM BLD-SCNC: 136 MMOL/L (ref 135–145)
SPECIFIC GRAVITY UA: 1.01 (ref 1–1.03)
STOMATOCYTES: ABNORMAL
TOTAL PROTEIN: 5.3 GM/DL (ref 6.4–8.2)
TRICHOMONAS: ABNORMAL /HPF
UNCLASSIFIED CAST: 2 /LPF
UROBILINOGEN, URINE: NORMAL MG/DL (ref 0.2–1)
WBC # BLD: 12 K/CU MM (ref 4–10.5)
WBC UA: 5 /HPF (ref 0–5)

## 2019-11-03 PROCEDURE — 85610 PROTHROMBIN TIME: CPT

## 2019-11-03 PROCEDURE — 85007 BL SMEAR W/DIFF WBC COUNT: CPT

## 2019-11-03 PROCEDURE — 6360000002 HC RX W HCPCS: Performed by: EMERGENCY MEDICINE

## 2019-11-03 PROCEDURE — 80053 COMPREHEN METABOLIC PANEL: CPT

## 2019-11-03 PROCEDURE — 1200000000 HC SEMI PRIVATE

## 2019-11-03 PROCEDURE — 96374 THER/PROPH/DIAG INJ IV PUSH: CPT

## 2019-11-03 PROCEDURE — 73501 X-RAY EXAM HIP UNI 1 VIEW: CPT

## 2019-11-03 PROCEDURE — 86901 BLOOD TYPING SEROLOGIC RH(D): CPT

## 2019-11-03 PROCEDURE — 99285 EMERGENCY DEPT VISIT HI MDM: CPT

## 2019-11-03 PROCEDURE — 83735 ASSAY OF MAGNESIUM: CPT

## 2019-11-03 PROCEDURE — 85730 THROMBOPLASTIN TIME PARTIAL: CPT

## 2019-11-03 PROCEDURE — 85027 COMPLETE CBC AUTOMATED: CPT

## 2019-11-03 PROCEDURE — 71045 X-RAY EXAM CHEST 1 VIEW: CPT

## 2019-11-03 PROCEDURE — P9016 RBC LEUKOCYTES REDUCED: HCPCS

## 2019-11-03 PROCEDURE — 86922 COMPATIBILITY TEST ANTIGLOB: CPT

## 2019-11-03 PROCEDURE — 86900 BLOOD TYPING SEROLOGIC ABO: CPT

## 2019-11-03 PROCEDURE — 81001 URINALYSIS AUTO W/SCOPE: CPT

## 2019-11-03 PROCEDURE — 86850 RBC ANTIBODY SCREEN: CPT

## 2019-11-03 RX ORDER — PROCHLORPERAZINE EDISYLATE 5 MG/ML
10 INJECTION INTRAMUSCULAR; INTRAVENOUS EVERY 6 HOURS PRN
Status: DISCONTINUED | OUTPATIENT
Start: 2019-11-03 | End: 2019-11-08 | Stop reason: HOSPADM

## 2019-11-03 RX ORDER — ASCORBIC ACID 500 MG
500 TABLET ORAL DAILY
Status: DISCONTINUED | OUTPATIENT
Start: 2019-11-04 | End: 2019-11-08 | Stop reason: HOSPADM

## 2019-11-03 RX ORDER — VITAMIN E 268 MG
400 CAPSULE ORAL DAILY
Status: DISCONTINUED | OUTPATIENT
Start: 2019-11-04 | End: 2019-11-08 | Stop reason: HOSPADM

## 2019-11-03 RX ORDER — DEXTROSE MONOHYDRATE 25 G/50ML
12.5 INJECTION, SOLUTION INTRAVENOUS PRN
Status: DISCONTINUED | OUTPATIENT
Start: 2019-11-03 | End: 2019-11-08 | Stop reason: HOSPADM

## 2019-11-03 RX ORDER — CETIRIZINE HYDROCHLORIDE 10 MG/1
5 TABLET ORAL DAILY
Status: DISCONTINUED | OUTPATIENT
Start: 2019-11-04 | End: 2019-11-08 | Stop reason: HOSPADM

## 2019-11-03 RX ORDER — MORPHINE SULFATE 30 MG/1
30 TABLET ORAL EVERY 4 HOURS PRN
Status: ON HOLD | COMMUNITY
End: 2019-11-08 | Stop reason: SDUPTHER

## 2019-11-03 RX ORDER — FOLIC ACID 1 MG/1
800 TABLET ORAL DAILY
COMMUNITY

## 2019-11-03 RX ORDER — ONDANSETRON 2 MG/ML
4 INJECTION INTRAMUSCULAR; INTRAVENOUS EVERY 6 HOURS PRN
Status: DISCONTINUED | OUTPATIENT
Start: 2019-11-03 | End: 2019-11-08 | Stop reason: HOSPADM

## 2019-11-03 RX ORDER — ONDANSETRON 4 MG/1
8 TABLET, ORALLY DISINTEGRATING ORAL DAILY
Status: DISCONTINUED | OUTPATIENT
Start: 2019-11-04 | End: 2019-11-08 | Stop reason: HOSPADM

## 2019-11-03 RX ORDER — PANTOPRAZOLE SODIUM 20 MG/1
20 TABLET, DELAYED RELEASE ORAL
Status: DISCONTINUED | OUTPATIENT
Start: 2019-11-04 | End: 2019-11-04

## 2019-11-03 RX ORDER — ASPIRIN 81 MG/1
81 TABLET, CHEWABLE ORAL EVERY EVENING
Status: DISCONTINUED | OUTPATIENT
Start: 2019-11-04 | End: 2019-11-08 | Stop reason: HOSPADM

## 2019-11-03 RX ORDER — ACETAMINOPHEN 325 MG/1
650 TABLET ORAL EVERY 6 HOURS PRN
Status: DISCONTINUED | OUTPATIENT
Start: 2019-11-03 | End: 2019-11-08 | Stop reason: HOSPADM

## 2019-11-03 RX ORDER — FENTANYL CITRATE 50 UG/ML
25 INJECTION, SOLUTION INTRAMUSCULAR; INTRAVENOUS EVERY 30 MIN PRN
Status: DISCONTINUED | OUTPATIENT
Start: 2019-11-03 | End: 2019-11-03 | Stop reason: ALTCHOICE

## 2019-11-03 RX ORDER — FOLIC ACID 1 MG/1
1 TABLET ORAL DAILY
Status: DISCONTINUED | OUTPATIENT
Start: 2019-11-04 | End: 2019-11-08 | Stop reason: HOSPADM

## 2019-11-03 RX ORDER — OLANZAPINE 2.5 MG/1
2.5 TABLET ORAL NIGHTLY
COMMUNITY

## 2019-11-03 RX ORDER — POLYETHYLENE GLYCOL 1450
17 POWDER (GRAM) MISCELLANEOUS 2 TIMES DAILY PRN
Status: DISCONTINUED | OUTPATIENT
Start: 2019-11-03 | End: 2019-11-03

## 2019-11-03 RX ORDER — POTASSIUM CHLORIDE 750 MG/1
40 TABLET, FILM COATED, EXTENDED RELEASE ORAL ONCE
Status: COMPLETED | OUTPATIENT
Start: 2019-11-03 | End: 2019-11-04

## 2019-11-03 RX ORDER — M-VIT,TX,IRON,MINS/CALC/FOLIC 27MG-0.4MG
1 TABLET ORAL DAILY
Status: DISCONTINUED | OUTPATIENT
Start: 2019-11-04 | End: 2019-11-08 | Stop reason: HOSPADM

## 2019-11-03 RX ORDER — MAGNESIUM SULFATE IN WATER 40 MG/ML
2 INJECTION, SOLUTION INTRAVENOUS ONCE
Status: COMPLETED | OUTPATIENT
Start: 2019-11-03 | End: 2019-11-04

## 2019-11-03 RX ORDER — POLYETHYLENE GLYCOL 3350 17 G/17G
17 POWDER, FOR SOLUTION ORAL 2 TIMES DAILY PRN
Status: DISCONTINUED | OUTPATIENT
Start: 2019-11-03 | End: 2019-11-08 | Stop reason: HOSPADM

## 2019-11-03 RX ORDER — DEXAMETHASONE 4 MG/1
2 TABLET ORAL DAILY
Status: DISCONTINUED | OUTPATIENT
Start: 2019-11-04 | End: 2019-11-08 | Stop reason: HOSPADM

## 2019-11-03 RX ORDER — HYDROCODONE BITARTRATE AND ACETAMINOPHEN 7.5; 325 MG/1; MG/1
1 TABLET ORAL EVERY 4 HOURS PRN
Status: DISCONTINUED | OUTPATIENT
Start: 2019-11-03 | End: 2019-11-08 | Stop reason: HOSPADM

## 2019-11-03 RX ORDER — MORPHINE SULFATE 4 MG/ML
4 INJECTION, SOLUTION INTRAMUSCULAR; INTRAVENOUS EVERY 4 HOURS PRN
Status: DISCONTINUED | OUTPATIENT
Start: 2019-11-03 | End: 2019-11-05

## 2019-11-03 RX ORDER — ONDANSETRON 4 MG/1
8 TABLET, ORALLY DISINTEGRATING ORAL EVERY 8 HOURS PRN
Status: DISCONTINUED | OUTPATIENT
Start: 2019-11-03 | End: 2019-11-08 | Stop reason: HOSPADM

## 2019-11-03 RX ORDER — NICOTINE POLACRILEX 4 MG
15 LOZENGE BUCCAL PRN
Status: DISCONTINUED | OUTPATIENT
Start: 2019-11-03 | End: 2019-11-08 | Stop reason: HOSPADM

## 2019-11-03 RX ORDER — DEXTROSE MONOHYDRATE 50 MG/ML
100 INJECTION, SOLUTION INTRAVENOUS PRN
Status: DISCONTINUED | OUTPATIENT
Start: 2019-11-03 | End: 2019-11-08 | Stop reason: HOSPADM

## 2019-11-03 RX ORDER — SENNA PLUS 8.6 MG/1
2 TABLET ORAL EVERY EVENING
Status: DISCONTINUED | OUTPATIENT
Start: 2019-11-03 | End: 2019-11-08 | Stop reason: HOSPADM

## 2019-11-03 RX ORDER — MORPHINE SULFATE 15 MG/1
30 TABLET ORAL
Status: DISCONTINUED | OUTPATIENT
Start: 2019-11-04 | End: 2019-11-08 | Stop reason: HOSPADM

## 2019-11-03 RX ORDER — SODIUM CHLORIDE 9 MG/ML
INJECTION, SOLUTION INTRAVENOUS CONTINUOUS
Status: DISCONTINUED | OUTPATIENT
Start: 2019-11-04 | End: 2019-11-08 | Stop reason: HOSPADM

## 2019-11-03 RX ORDER — POTASSIUM CHLORIDE 1.5 G/1.77G
20 POWDER, FOR SOLUTION ORAL 2 TIMES DAILY
Status: DISCONTINUED | OUTPATIENT
Start: 2019-11-03 | End: 2019-11-04

## 2019-11-03 RX ORDER — NADOLOL 20 MG/1
80 TABLET ORAL DAILY
Status: DISCONTINUED | OUTPATIENT
Start: 2019-11-04 | End: 2019-11-08 | Stop reason: HOSPADM

## 2019-11-03 RX ADMIN — FENTANYL CITRATE 25 MCG: 50 INJECTION, SOLUTION INTRAMUSCULAR; INTRAVENOUS at 21:38

## 2019-11-03 ASSESSMENT — PAIN SCALES - GENERAL
PAINLEVEL_OUTOF10: 1
PAINLEVEL_OUTOF10: 6
PAINLEVEL_OUTOF10: 7

## 2019-11-04 ENCOUNTER — APPOINTMENT (OUTPATIENT)
Dept: ULTRASOUND IMAGING | Age: 69
DRG: 470 | End: 2019-11-04
Payer: MEDICARE

## 2019-11-04 LAB
ANION GAP SERPL CALCULATED.3IONS-SCNC: 12 MMOL/L (ref 4–16)
ANISOCYTOSIS: ABNORMAL
APTT: 26.1 SECONDS (ref 25.1–37.1)
BANDED NEUTROPHILS ABSOLUTE COUNT: 1.52 K/CU MM
BANDED NEUTROPHILS RELATIVE PERCENT: 16 % (ref 5–11)
BASOPHILIC STIPPLING: PRESENT
BUN BLDV-MCNC: 29 MG/DL (ref 6–23)
CALCIUM SERPL-MCNC: 7.7 MG/DL (ref 8.3–10.6)
CHLORIDE BLD-SCNC: 100 MMOL/L (ref 99–110)
CO2: 32 MMOL/L (ref 21–32)
CREAT SERPL-MCNC: 0.5 MG/DL (ref 0.6–1.1)
DIFFERENTIAL TYPE: ABNORMAL
ESTIMATED AVERAGE GLUCOSE: 120 MG/DL
GFR AFRICAN AMERICAN: >60 ML/MIN/1.73M2
GFR NON-AFRICAN AMERICAN: >60 ML/MIN/1.73M2
GLUCOSE BLD-MCNC: 151 MG/DL (ref 70–99)
GLUCOSE BLD-MCNC: 154 MG/DL (ref 70–99)
GLUCOSE BLD-MCNC: 156 MG/DL (ref 70–99)
GLUCOSE BLD-MCNC: 182 MG/DL (ref 70–99)
GLUCOSE BLD-MCNC: 214 MG/DL (ref 70–99)
GLUCOSE BLD-MCNC: 241 MG/DL (ref 70–99)
GLUCOSE BLD-MCNC: 261 MG/DL (ref 70–99)
HBA1C MFR BLD: 5.8 % (ref 4.2–6.3)
HCT VFR BLD CALC: 28.4 % (ref 37–47)
HEMOGLOBIN: 8.7 GM/DL (ref 12.5–16)
HYPOCHROMIA: ABNORMAL
INR BLD: 1.31 INDEX
LV EF: 53 %
LVEF MODALITY: NORMAL
LYMPHOCYTES ABSOLUTE: 1.5 K/CU MM
LYMPHOCYTES RELATIVE PERCENT: 16 % (ref 24–44)
MACROCYTES: ABNORMAL
MCH RBC QN AUTO: 29.9 PG (ref 27–31)
MCHC RBC AUTO-ENTMCNC: 30.6 % (ref 32–36)
MCV RBC AUTO: 97.6 FL (ref 78–100)
METAMYELOCYTES ABSOLUTE COUNT: 0.57 K/CU MM
METAMYELOCYTES PERCENT: 6 %
MICROCYTES: ABNORMAL
MONOCYTES ABSOLUTE: 0.5 K/CU MM
MONOCYTES RELATIVE PERCENT: 5 % (ref 0–4)
MYELOCYTE PERCENT: 3 %
MYELOCYTES ABSOLUTE COUNT: 0.29 K/CU MM
NUCLEATED RED BLOOD CELLS: 22
PDW BLD-RTO: 23.1 % (ref 11.7–14.9)
PLATELET # BLD: ABNORMAL K/CU MM (ref 140–440)
POLYCHROMASIA: ABNORMAL
POTASSIUM SERPL-SCNC: 3.7 MMOL/L (ref 3.5–5.1)
PROTHROMBIN TIME: 15.2 SECONDS (ref 11.7–14.5)
RBC # BLD: 2.91 M/CU MM (ref 4.2–5.4)
SEGMENTED NEUTROPHILS ABSOLUTE COUNT: 5.1 K/CU MM
SEGMENTED NEUTROPHILS RELATIVE PERCENT: 54 % (ref 36–66)
SODIUM BLD-SCNC: 144 MMOL/L (ref 135–145)
WBC # BLD: 9.5 K/CU MM (ref 4–10.5)

## 2019-11-04 PROCEDURE — 85007 BL SMEAR W/DIFF WBC COUNT: CPT

## 2019-11-04 PROCEDURE — 2580000003 HC RX 258: Performed by: INTERNAL MEDICINE

## 2019-11-04 PROCEDURE — 93925 LOWER EXTREMITY STUDY: CPT

## 2019-11-04 PROCEDURE — 6370000000 HC RX 637 (ALT 250 FOR IP): Performed by: INTERNAL MEDICINE

## 2019-11-04 PROCEDURE — 93306 TTE W/DOPPLER COMPLETE: CPT

## 2019-11-04 PROCEDURE — 85027 COMPLETE CBC AUTOMATED: CPT

## 2019-11-04 PROCEDURE — 93970 EXTREMITY STUDY: CPT

## 2019-11-04 PROCEDURE — 82962 GLUCOSE BLOOD TEST: CPT

## 2019-11-04 PROCEDURE — 83036 HEMOGLOBIN GLYCOSYLATED A1C: CPT

## 2019-11-04 PROCEDURE — 99211 OFF/OP EST MAY X REQ PHY/QHP: CPT

## 2019-11-04 PROCEDURE — 36415 COLL VENOUS BLD VENIPUNCTURE: CPT

## 2019-11-04 PROCEDURE — 85730 THROMBOPLASTIN TIME PARTIAL: CPT

## 2019-11-04 PROCEDURE — 85610 PROTHROMBIN TIME: CPT

## 2019-11-04 PROCEDURE — 2700000000 HC OXYGEN THERAPY PER DAY

## 2019-11-04 PROCEDURE — 80048 BASIC METABOLIC PNL TOTAL CA: CPT

## 2019-11-04 PROCEDURE — 6360000002 HC RX W HCPCS: Performed by: INTERNAL MEDICINE

## 2019-11-04 PROCEDURE — 1200000000 HC SEMI PRIVATE

## 2019-11-04 RX ORDER — CALCIUM POLYCARBOPHIL 625 MG 625 MG/1
625 TABLET ORAL 2 TIMES DAILY
Status: DISCONTINUED | OUTPATIENT
Start: 2019-11-04 | End: 2019-11-08 | Stop reason: HOSPADM

## 2019-11-04 RX ORDER — POTASSIUM CHLORIDE 1500 MG/1
20 TABLET, FILM COATED, EXTENDED RELEASE ORAL 2 TIMES DAILY
COMMUNITY

## 2019-11-04 RX ORDER — CEFAZOLIN SODIUM 2 G/100ML
2 INJECTION, SOLUTION INTRAVENOUS ONCE
Status: DISCONTINUED | OUTPATIENT
Start: 2019-11-04 | End: 2019-11-05

## 2019-11-04 RX ORDER — CEFAZOLIN SODIUM 1 G/3ML
2 INJECTION, POWDER, FOR SOLUTION INTRAMUSCULAR; INTRAVENOUS
Status: DISCONTINUED | OUTPATIENT
Start: 2019-11-04 | End: 2019-11-04 | Stop reason: SDUPTHER

## 2019-11-04 RX ORDER — FAMOTIDINE 20 MG/1
20 TABLET, FILM COATED ORAL 2 TIMES DAILY
Status: DISCONTINUED | OUTPATIENT
Start: 2019-11-04 | End: 2019-11-08 | Stop reason: HOSPADM

## 2019-11-04 RX ORDER — POTASSIUM CHLORIDE 20 MEQ/1
20 TABLET, EXTENDED RELEASE ORAL 2 TIMES DAILY
Status: DISCONTINUED | OUTPATIENT
Start: 2019-11-04 | End: 2019-11-08 | Stop reason: HOSPADM

## 2019-11-04 RX ADMIN — POTASSIUM CHLORIDE 20 MEQ: 1.5 POWDER, FOR SOLUTION ORAL at 08:37

## 2019-11-04 RX ADMIN — INSULIN LISPRO 1 UNITS: 100 INJECTION, SOLUTION INTRAVENOUS; SUBCUTANEOUS at 12:00

## 2019-11-04 RX ADMIN — MORPHINE SULFATE 30 MG: 15 TABLET ORAL at 10:26

## 2019-11-04 RX ADMIN — MULTIPLE VITAMINS W/ MINERALS TAB 1 TABLET: TAB at 08:36

## 2019-11-04 RX ADMIN — MAGNESIUM SULFATE HEPTAHYDRATE 2 G: 40 INJECTION, SOLUTION INTRAVENOUS at 01:18

## 2019-11-04 RX ADMIN — CALCIUM POLYCARBOPHIL 625 MG TABLET 625 MG: at 22:03

## 2019-11-04 RX ADMIN — PANTOPRAZOLE SODIUM 20 MG: 20 TABLET, DELAYED RELEASE ORAL at 06:56

## 2019-11-04 RX ADMIN — CETIRIZINE HYDROCHLORIDE 5 MG: 10 TABLET, FILM COATED ORAL at 08:36

## 2019-11-04 RX ADMIN — ONDANSETRON 8 MG: 4 TABLET, ORALLY DISINTEGRATING ORAL at 08:36

## 2019-11-04 RX ADMIN — POTASSIUM CHLORIDE 40 MEQ: 750 TABLET, FILM COATED, EXTENDED RELEASE ORAL at 01:16

## 2019-11-04 RX ADMIN — DEXAMETHASONE 2 MG: 4 TABLET ORAL at 08:36

## 2019-11-04 RX ADMIN — FAMOTIDINE 20 MG: 20 TABLET ORAL at 20:32

## 2019-11-04 RX ADMIN — SENNOSIDES 17.2 MG: 8.6 TABLET, FILM COATED ORAL at 18:39

## 2019-11-04 RX ADMIN — NADOLOL 80 MG: 20 TABLET ORAL at 08:42

## 2019-11-04 RX ADMIN — OXYCODONE HYDROCHLORIDE AND ACETAMINOPHEN 500 MG: 500 TABLET ORAL at 08:36

## 2019-11-04 RX ADMIN — SODIUM CHLORIDE: 9 INJECTION, SOLUTION INTRAVENOUS at 03:34

## 2019-11-04 RX ADMIN — POTASSIUM CHLORIDE 20 MEQ: 20 TABLET, EXTENDED RELEASE ORAL at 20:32

## 2019-11-04 RX ADMIN — FOLIC ACID 1 MG: 1 TABLET ORAL at 08:36

## 2019-11-04 RX ADMIN — HYDROCODONE BITARTRATE AND ACETAMINOPHEN 1 TABLET: 7.5; 325 TABLET ORAL at 20:32

## 2019-11-04 RX ADMIN — COLLAGENASE SANTYL: 250 OINTMENT TOPICAL at 18:39

## 2019-11-04 RX ADMIN — INSULIN LISPRO 3 UNITS: 100 INJECTION, SOLUTION INTRAVENOUS; SUBCUTANEOUS at 23:50

## 2019-11-04 RX ADMIN — INSULIN LISPRO 2 UNITS: 100 INJECTION, SOLUTION INTRAVENOUS; SUBCUTANEOUS at 16:38

## 2019-11-04 RX ADMIN — Medication 1 PACKET: at 08:37

## 2019-11-04 RX ADMIN — Medication 400 UNITS: at 08:36

## 2019-11-04 ASSESSMENT — PAIN DESCRIPTION - ORIENTATION: ORIENTATION: RIGHT

## 2019-11-04 ASSESSMENT — PAIN SCALES - GENERAL
PAINLEVEL_OUTOF10: 0
PAINLEVEL_OUTOF10: 7
PAINLEVEL_OUTOF10: 0
PAINLEVEL_OUTOF10: 6
PAINLEVEL_OUTOF10: 6
PAINLEVEL_OUTOF10: 0

## 2019-11-04 ASSESSMENT — PAIN DESCRIPTION - PAIN TYPE: TYPE: CHRONIC PAIN;ACUTE PAIN

## 2019-11-04 ASSESSMENT — PAIN DESCRIPTION - LOCATION: LOCATION: HIP

## 2019-11-05 ENCOUNTER — ANESTHESIA EVENT (OUTPATIENT)
Dept: OPERATING ROOM | Age: 69
DRG: 470 | End: 2019-11-05
Payer: MEDICARE

## 2019-11-05 ENCOUNTER — APPOINTMENT (OUTPATIENT)
Dept: GENERAL RADIOLOGY | Age: 69
DRG: 470 | End: 2019-11-05
Payer: MEDICARE

## 2019-11-05 ENCOUNTER — ANESTHESIA (OUTPATIENT)
Dept: OPERATING ROOM | Age: 69
DRG: 470 | End: 2019-11-05
Payer: MEDICARE

## 2019-11-05 VITALS
OXYGEN SATURATION: 100 % | DIASTOLIC BLOOD PRESSURE: 77 MMHG | SYSTOLIC BLOOD PRESSURE: 108 MMHG | RESPIRATION RATE: 60 BRPM

## 2019-11-05 LAB
ANION GAP SERPL CALCULATED.3IONS-SCNC: 8 MMOL/L (ref 4–16)
ANISOCYTOSIS: ABNORMAL
APTT: 26.1 SECONDS (ref 25.1–37.1)
ATYPICAL LYMPHOCYTE ABSOLUTE COUNT: ABNORMAL
BANDED NEUTROPHILS ABSOLUTE COUNT: 1.31 K/CU MM
BANDED NEUTROPHILS RELATIVE PERCENT: 18 % (ref 5–11)
BASOPHILIC STIPPLING: PRESENT
BUN BLDV-MCNC: 25 MG/DL (ref 6–23)
CALCIUM SERPL-MCNC: 7.5 MG/DL (ref 8.3–10.6)
CHLORIDE BLD-SCNC: 104 MMOL/L (ref 99–110)
CO2: 31 MMOL/L (ref 21–32)
CREAT SERPL-MCNC: 0.5 MG/DL (ref 0.6–1.1)
DIFFERENTIAL TYPE: ABNORMAL
FIBRINOGEN LEVEL: 267 MG/DL (ref 196.9–442.1)
GFR AFRICAN AMERICAN: >60 ML/MIN/1.73M2
GFR NON-AFRICAN AMERICAN: >60 ML/MIN/1.73M2
GLUCOSE BLD-MCNC: 110 MG/DL (ref 70–99)
GLUCOSE BLD-MCNC: 120 MG/DL (ref 70–99)
GLUCOSE BLD-MCNC: 125 MG/DL (ref 70–99)
GLUCOSE BLD-MCNC: 170 MG/DL (ref 70–99)
GLUCOSE BLD-MCNC: 96 MG/DL (ref 70–99)
GLUCOSE BLD-MCNC: 99 MG/DL (ref 70–99)
HCT VFR BLD CALC: 23.6 % (ref 37–47)
HCT VFR BLD CALC: 27.4 % (ref 37–47)
HEMOGLOBIN: 7 GM/DL (ref 12.5–16)
HEMOGLOBIN: 8.5 GM/DL (ref 12.5–16)
INR BLD: 1.25 INDEX
LYMPHOCYTES ABSOLUTE: 0.4 K/CU MM
LYMPHOCYTES RELATIVE PERCENT: 5 % (ref 24–44)
MACROCYTES: ABNORMAL
MCH RBC QN AUTO: 29.6 PG (ref 27–31)
MCH RBC QN AUTO: 30.2 PG (ref 27–31)
MCHC RBC AUTO-ENTMCNC: 29.7 % (ref 32–36)
MCHC RBC AUTO-ENTMCNC: 31 % (ref 32–36)
MCV RBC AUTO: 101.7 FL (ref 78–100)
MCV RBC AUTO: 95.5 FL (ref 78–100)
METAMYELOCYTES ABSOLUTE COUNT: 0.51 K/CU MM
METAMYELOCYTES PERCENT: 7 %
MONOCYTES ABSOLUTE: 0.1 K/CU MM
MONOCYTES RELATIVE PERCENT: 2 % (ref 0–4)
MYELOCYTE PERCENT: 3 %
MYELOCYTES ABSOLUTE COUNT: 0.22 K/CU MM
NUCLEATED RED BLOOD CELLS: 25
PDW BLD-RTO: 18.2 % (ref 11.7–14.9)
PDW BLD-RTO: 23.1 % (ref 11.7–14.9)
PLATELET # BLD: 118 K/CU MM (ref 140–440)
PLATELET # BLD: ABNORMAL K/CU MM (ref 140–440)
PLT MORPHOLOGY: ABNORMAL
PMV BLD AUTO: 9.8 FL (ref 7.5–11.1)
POLYCHROMASIA: ABNORMAL
POTASSIUM SERPL-SCNC: 3.9 MMOL/L (ref 3.5–5.1)
PROTHROMBIN TIME: 14.5 SECONDS (ref 11.7–14.5)
RBC # BLD: 2.32 M/CU MM (ref 4.2–5.4)
RBC # BLD: 2.87 M/CU MM (ref 4.2–5.4)
SEGMENTED NEUTROPHILS ABSOLUTE COUNT: 4.8 K/CU MM
SEGMENTED NEUTROPHILS RELATIVE PERCENT: 65 % (ref 36–66)
SODIUM BLD-SCNC: 143 MMOL/L (ref 135–145)
WBC # BLD: 4.5 K/CU MM (ref 4–10.5)
WBC # BLD: 7.3 K/CU MM (ref 4–10.5)

## 2019-11-05 PROCEDURE — 6360000002 HC RX W HCPCS: Performed by: ANESTHESIOLOGY

## 2019-11-05 PROCEDURE — 6360000002 HC RX W HCPCS

## 2019-11-05 PROCEDURE — 6360000002 HC RX W HCPCS: Performed by: NURSE ANESTHETIST, CERTIFIED REGISTERED

## 2019-11-05 PROCEDURE — 2500000003 HC RX 250 WO HCPCS: Performed by: NURSE ANESTHETIST, CERTIFIED REGISTERED

## 2019-11-05 PROCEDURE — P9016 RBC LEUKOCYTES REDUCED: HCPCS

## 2019-11-05 PROCEDURE — 3700000001 HC ADD 15 MINUTES (ANESTHESIA): Performed by: ORTHOPAEDIC SURGERY

## 2019-11-05 PROCEDURE — 80048 BASIC METABOLIC PNL TOTAL CA: CPT

## 2019-11-05 PROCEDURE — 82962 GLUCOSE BLOOD TEST: CPT

## 2019-11-05 PROCEDURE — 2580000003 HC RX 258: Performed by: INTERNAL MEDICINE

## 2019-11-05 PROCEDURE — 86927 PLASMA FRESH FROZEN: CPT

## 2019-11-05 PROCEDURE — 7100000001 HC PACU RECOVERY - ADDTL 15 MIN: Performed by: ORTHOPAEDIC SURGERY

## 2019-11-05 PROCEDURE — 3600000014 HC SURGERY LEVEL 4 ADDTL 15MIN: Performed by: ORTHOPAEDIC SURGERY

## 2019-11-05 PROCEDURE — 7100000000 HC PACU RECOVERY - FIRST 15 MIN: Performed by: ORTHOPAEDIC SURGERY

## 2019-11-05 PROCEDURE — 2700000000 HC OXYGEN THERAPY PER DAY

## 2019-11-05 PROCEDURE — 85730 THROMBOPLASTIN TIME PARTIAL: CPT

## 2019-11-05 PROCEDURE — 86850 RBC ANTIBODY SCREEN: CPT

## 2019-11-05 PROCEDURE — 36430 TRANSFUSION BLD/BLD COMPNT: CPT

## 2019-11-05 PROCEDURE — P9017 PLASMA 1 DONOR FRZ W/IN 8 HR: HCPCS

## 2019-11-05 PROCEDURE — 1200000000 HC SEMI PRIVATE

## 2019-11-05 PROCEDURE — 6370000000 HC RX 637 (ALT 250 FOR IP): Performed by: INTERNAL MEDICINE

## 2019-11-05 PROCEDURE — 2580000003 HC RX 258

## 2019-11-05 PROCEDURE — 36415 COLL VENOUS BLD VENIPUNCTURE: CPT

## 2019-11-05 PROCEDURE — 2720000010 HC SURG SUPPLY STERILE: Performed by: ORTHOPAEDIC SURGERY

## 2019-11-05 PROCEDURE — 0SRR0J9 REPLACEMENT OF RIGHT HIP JOINT, FEMORAL SURFACE WITH SYNTHETIC SUBSTITUTE, CEMENTED, OPEN APPROACH: ICD-10-PCS | Performed by: ORTHOPAEDIC SURGERY

## 2019-11-05 PROCEDURE — 86901 BLOOD TYPING SEROLOGIC RH(D): CPT

## 2019-11-05 PROCEDURE — 86900 BLOOD TYPING SEROLOGIC ABO: CPT

## 2019-11-05 PROCEDURE — 86965 POOLING BLOOD PLATELETS: CPT

## 2019-11-05 PROCEDURE — P9034 PLATELETS, PHERESIS: HCPCS

## 2019-11-05 PROCEDURE — 94761 N-INVAS EAR/PLS OXIMETRY MLT: CPT

## 2019-11-05 PROCEDURE — 85007 BL SMEAR W/DIFF WBC COUNT: CPT

## 2019-11-05 PROCEDURE — 6360000002 HC RX W HCPCS: Performed by: ORTHOPAEDIC SURGERY

## 2019-11-05 PROCEDURE — 73502 X-RAY EXAM HIP UNI 2-3 VIEWS: CPT

## 2019-11-05 PROCEDURE — P9012 CRYOPRECIPITATE EACH UNIT: HCPCS

## 2019-11-05 PROCEDURE — C1713 ANCHOR/SCREW BN/BN,TIS/BN: HCPCS | Performed by: ORTHOPAEDIC SURGERY

## 2019-11-05 PROCEDURE — 6360000002 HC RX W HCPCS: Performed by: PHYSICIAN ASSISTANT

## 2019-11-05 PROCEDURE — 85027 COMPLETE CBC AUTOMATED: CPT

## 2019-11-05 PROCEDURE — 6370000000 HC RX 637 (ALT 250 FOR IP): Performed by: ORTHOPAEDIC SURGERY

## 2019-11-05 PROCEDURE — 85384 FIBRINOGEN ACTIVITY: CPT

## 2019-11-05 PROCEDURE — 3700000000 HC ANESTHESIA ATTENDED CARE: Performed by: ORTHOPAEDIC SURGERY

## 2019-11-05 PROCEDURE — C1776 JOINT DEVICE (IMPLANTABLE): HCPCS | Performed by: ORTHOPAEDIC SURGERY

## 2019-11-05 PROCEDURE — 2709999900 HC NON-CHARGEABLE SUPPLY: Performed by: ORTHOPAEDIC SURGERY

## 2019-11-05 PROCEDURE — 2580000003 HC RX 258: Performed by: NURSE ANESTHETIST, CERTIFIED REGISTERED

## 2019-11-05 PROCEDURE — 3600000004 HC SURGERY LEVEL 4 BASE: Performed by: ORTHOPAEDIC SURGERY

## 2019-11-05 PROCEDURE — 85610 PROTHROMBIN TIME: CPT

## 2019-11-05 DEVICE — CEMENT BNE 40GM HI VISC RADPQ FOR REV SURG: Type: IMPLANTABLE DEVICE | Site: HIP | Status: FUNCTIONAL

## 2019-11-05 DEVICE — IMPLANTABLE DEVICE: Type: IMPLANTABLE DEVICE | Site: HIP | Status: FUNCTIONAL

## 2019-11-05 DEVICE — ADAPTER FEM L-4MM UPLR NEUT NK: Type: IMPLANTABLE DEVICE | Site: HIP | Status: FUNCTIONAL

## 2019-11-05 DEVICE — KIT BNE CEM PREP FEM QUIK-USE 1 PER CA: Type: IMPLANTABLE DEVICE | Site: HIP | Status: FUNCTIONAL

## 2019-11-05 RX ORDER — CEFAZOLIN SODIUM 1 G/50ML
1 INJECTION, SOLUTION INTRAVENOUS EVERY 8 HOURS
Status: COMPLETED | OUTPATIENT
Start: 2019-11-05 | End: 2019-11-06

## 2019-11-05 RX ORDER — 0.9 % SODIUM CHLORIDE 0.9 %
250 INTRAVENOUS SOLUTION INTRAVENOUS ONCE
Status: DISCONTINUED | OUTPATIENT
Start: 2019-11-05 | End: 2019-11-06

## 2019-11-05 RX ORDER — 0.9 % SODIUM CHLORIDE 0.9 %
250 INTRAVENOUS SOLUTION INTRAVENOUS ONCE
Status: COMPLETED | OUTPATIENT
Start: 2019-11-05 | End: 2019-11-05

## 2019-11-05 RX ORDER — LABETALOL 20 MG/4 ML (5 MG/ML) INTRAVENOUS SYRINGE
5 EVERY 10 MIN PRN
Status: DISCONTINUED | OUTPATIENT
Start: 2019-11-05 | End: 2019-11-05 | Stop reason: HOSPADM

## 2019-11-05 RX ORDER — FENTANYL CITRATE 50 UG/ML
25 INJECTION, SOLUTION INTRAMUSCULAR; INTRAVENOUS EVERY 5 MIN PRN
Status: DISCONTINUED | OUTPATIENT
Start: 2019-11-05 | End: 2019-11-05 | Stop reason: HOSPADM

## 2019-11-05 RX ORDER — PROPOFOL 10 MG/ML
INJECTION, EMULSION INTRAVENOUS PRN
Status: DISCONTINUED | OUTPATIENT
Start: 2019-11-05 | End: 2019-11-05 | Stop reason: SDUPTHER

## 2019-11-05 RX ORDER — 0.9 % SODIUM CHLORIDE 0.9 %
250 INTRAVENOUS SOLUTION INTRAVENOUS ONCE
Status: DISCONTINUED | OUTPATIENT
Start: 2019-11-05 | End: 2019-11-08 | Stop reason: HOSPADM

## 2019-11-05 RX ORDER — ROCURONIUM BROMIDE 10 MG/ML
INJECTION, SOLUTION INTRAVENOUS PRN
Status: DISCONTINUED | OUTPATIENT
Start: 2019-11-05 | End: 2019-11-05 | Stop reason: SDUPTHER

## 2019-11-05 RX ORDER — SODIUM CHLORIDE 9 MG/ML
INJECTION, SOLUTION INTRAVENOUS
Status: COMPLETED
Start: 2019-11-05 | End: 2019-11-05

## 2019-11-05 RX ORDER — MORPHINE SULFATE 2 MG/ML
2 INJECTION, SOLUTION INTRAMUSCULAR; INTRAVENOUS EVERY 4 HOURS PRN
Status: DISCONTINUED | OUTPATIENT
Start: 2019-11-05 | End: 2019-11-08 | Stop reason: HOSPADM

## 2019-11-05 RX ORDER — KETAMINE HYDROCHLORIDE 10 MG/ML
INJECTION, SOLUTION INTRAMUSCULAR; INTRAVENOUS PRN
Status: DISCONTINUED | OUTPATIENT
Start: 2019-11-05 | End: 2019-11-05 | Stop reason: SDUPTHER

## 2019-11-05 RX ORDER — ONDANSETRON 2 MG/ML
4 INJECTION INTRAMUSCULAR; INTRAVENOUS
Status: DISCONTINUED | OUTPATIENT
Start: 2019-11-05 | End: 2019-11-05 | Stop reason: HOSPADM

## 2019-11-05 RX ORDER — SODIUM CHLORIDE 9 MG/ML
INJECTION, SOLUTION INTRAVENOUS CONTINUOUS PRN
Status: DISCONTINUED | OUTPATIENT
Start: 2019-11-05 | End: 2019-11-05 | Stop reason: SDUPTHER

## 2019-11-05 RX ORDER — FENTANYL CITRATE 50 UG/ML
50 INJECTION, SOLUTION INTRAMUSCULAR; INTRAVENOUS EVERY 5 MIN PRN
Status: DISCONTINUED | OUTPATIENT
Start: 2019-11-05 | End: 2019-11-05 | Stop reason: HOSPADM

## 2019-11-05 RX ORDER — LIDOCAINE HYDROCHLORIDE 20 MG/ML
INJECTION, SOLUTION INTRAVENOUS PRN
Status: DISCONTINUED | OUTPATIENT
Start: 2019-11-05 | End: 2019-11-05 | Stop reason: SDUPTHER

## 2019-11-05 RX ORDER — ACETAMINOPHEN 10 MG/ML
INJECTION, SOLUTION INTRAVENOUS PRN
Status: DISCONTINUED | OUTPATIENT
Start: 2019-11-05 | End: 2019-11-05 | Stop reason: SDUPTHER

## 2019-11-05 RX ORDER — MORPHINE SULFATE 2 MG/ML
INJECTION, SOLUTION INTRAMUSCULAR; INTRAVENOUS
Status: COMPLETED
Start: 2019-11-05 | End: 2019-11-05

## 2019-11-05 RX ORDER — PHENYLEPHRINE HYDROCHLORIDE 10 MG/ML
INJECTION INTRAVENOUS PRN
Status: DISCONTINUED | OUTPATIENT
Start: 2019-11-05 | End: 2019-11-05 | Stop reason: SDUPTHER

## 2019-11-05 RX ORDER — HYDRALAZINE HYDROCHLORIDE 20 MG/ML
5 INJECTION INTRAMUSCULAR; INTRAVENOUS EVERY 10 MIN PRN
Status: DISCONTINUED | OUTPATIENT
Start: 2019-11-05 | End: 2019-11-05 | Stop reason: HOSPADM

## 2019-11-05 RX ORDER — FENTANYL CITRATE 50 UG/ML
INJECTION, SOLUTION INTRAMUSCULAR; INTRAVENOUS PRN
Status: DISCONTINUED | OUTPATIENT
Start: 2019-11-05 | End: 2019-11-05 | Stop reason: SDUPTHER

## 2019-11-05 RX ORDER — ONDANSETRON 2 MG/ML
INJECTION INTRAMUSCULAR; INTRAVENOUS PRN
Status: DISCONTINUED | OUTPATIENT
Start: 2019-11-05 | End: 2019-11-05 | Stop reason: SDUPTHER

## 2019-11-05 RX ORDER — DEXAMETHASONE SODIUM PHOSPHATE 4 MG/ML
INJECTION, SOLUTION INTRA-ARTICULAR; INTRALESIONAL; INTRAMUSCULAR; INTRAVENOUS; SOFT TISSUE PRN
Status: DISCONTINUED | OUTPATIENT
Start: 2019-11-05 | End: 2019-11-05 | Stop reason: SDUPTHER

## 2019-11-05 RX ORDER — CEFAZOLIN SODIUM 2 G/100ML
2 INJECTION, SOLUTION INTRAVENOUS ONCE
Status: COMPLETED | OUTPATIENT
Start: 2019-11-05 | End: 2019-11-05

## 2019-11-05 RX ADMIN — SODIUM CHLORIDE 250 ML: 9 INJECTION, SOLUTION INTRAVENOUS at 10:25

## 2019-11-05 RX ADMIN — FENTANYL CITRATE 50 MCG: 50 INJECTION INTRAMUSCULAR; INTRAVENOUS at 13:03

## 2019-11-05 RX ADMIN — SODIUM CHLORIDE: 9 INJECTION, SOLUTION INTRAVENOUS at 13:00

## 2019-11-05 RX ADMIN — PHENYLEPHRINE HYDROCHLORIDE 100 MCG: 10 INJECTION INTRAVENOUS at 13:38

## 2019-11-05 RX ADMIN — PHENYLEPHRINE HYDROCHLORIDE 200 MCG: 10 INJECTION INTRAVENOUS at 12:52

## 2019-11-05 RX ADMIN — PHENYLEPHRINE HYDROCHLORIDE 100 MCG: 10 INJECTION INTRAVENOUS at 13:42

## 2019-11-05 RX ADMIN — NADOLOL 80 MG: 20 TABLET ORAL at 11:14

## 2019-11-05 RX ADMIN — PHENYLEPHRINE HYDROCHLORIDE 100 MCG: 10 INJECTION INTRAVENOUS at 13:55

## 2019-11-05 RX ADMIN — ONDANSETRON 4 MG: 2 INJECTION INTRAMUSCULAR; INTRAVENOUS at 13:03

## 2019-11-05 RX ADMIN — CEFAZOLIN SODIUM 2 G: 2 INJECTION, SOLUTION INTRAVENOUS at 12:55

## 2019-11-05 RX ADMIN — MORPHINE SULFATE 2 MG: 2 INJECTION, SOLUTION INTRAMUSCULAR; INTRAVENOUS at 10:14

## 2019-11-05 RX ADMIN — ACETAMINOPHEN 1000 MG: 10 INJECTION, SOLUTION INTRAVENOUS at 13:09

## 2019-11-05 RX ADMIN — PHENYLEPHRINE HYDROCHLORIDE 50 MCG: 10 INJECTION INTRAVENOUS at 13:03

## 2019-11-05 RX ADMIN — POTASSIUM CHLORIDE 20 MEQ: 20 TABLET, EXTENDED RELEASE ORAL at 22:28

## 2019-11-05 RX ADMIN — SODIUM CHLORIDE: 9 INJECTION, SOLUTION INTRAVENOUS at 15:02

## 2019-11-05 RX ADMIN — HYDROCODONE BITARTRATE AND ACETAMINOPHEN 1 TABLET: 7.5; 325 TABLET ORAL at 18:08

## 2019-11-05 RX ADMIN — FENTANYL CITRATE 25 MCG: 50 INJECTION, SOLUTION INTRAMUSCULAR; INTRAVENOUS at 15:10

## 2019-11-05 RX ADMIN — PHENYLEPHRINE HYDROCHLORIDE 50 MCG: 10 INJECTION INTRAVENOUS at 13:11

## 2019-11-05 RX ADMIN — FENTANYL CITRATE 25 MCG: 50 INJECTION INTRAMUSCULAR; INTRAVENOUS at 13:15

## 2019-11-05 RX ADMIN — SUGAMMADEX 200 MG: 100 INJECTION, SOLUTION INTRAVENOUS at 14:06

## 2019-11-05 RX ADMIN — FENTANYL CITRATE 25 MCG: 50 INJECTION INTRAMUSCULAR; INTRAVENOUS at 13:57

## 2019-11-05 RX ADMIN — SODIUM CHLORIDE 250 ML: 9 INJECTION, SOLUTION INTRAVENOUS at 12:02

## 2019-11-05 RX ADMIN — HYDROCODONE BITARTRATE AND ACETAMINOPHEN 1 TABLET: 7.5; 325 TABLET ORAL at 22:04

## 2019-11-05 RX ADMIN — COLLAGENASE SANTYL: 250 OINTMENT TOPICAL at 09:57

## 2019-11-05 RX ADMIN — ROCURONIUM BROMIDE 50 MG: 10 INJECTION INTRAVENOUS at 13:03

## 2019-11-05 RX ADMIN — CEFAZOLIN SODIUM 1 G: 1 INJECTION, SOLUTION INTRAVENOUS at 22:28

## 2019-11-05 RX ADMIN — Medication 250 ML: at 12:02

## 2019-11-05 RX ADMIN — PHENYLEPHRINE HYDROCHLORIDE 100 MCG: 10 INJECTION INTRAVENOUS at 13:52

## 2019-11-05 RX ADMIN — DEXAMETHASONE SODIUM PHOSPHATE 8 MG: 4 INJECTION, SOLUTION INTRAMUSCULAR; INTRAVENOUS at 13:03

## 2019-11-05 RX ADMIN — PROPOFOL 50 MG: 10 INJECTION, EMULSION INTRAVENOUS at 13:03

## 2019-11-05 RX ADMIN — PHENYLEPHRINE HYDROCHLORIDE 100 MCG: 10 INJECTION INTRAVENOUS at 13:16

## 2019-11-05 RX ADMIN — SODIUM CHLORIDE: 9 INJECTION, SOLUTION INTRAVENOUS at 02:44

## 2019-11-05 RX ADMIN — LIDOCAINE HYDROCHLORIDE 100 MG: 20 INJECTION, SOLUTION INTRAVENOUS at 13:03

## 2019-11-05 RX ADMIN — PHENYLEPHRINE HYDROCHLORIDE 100 MCG: 10 INJECTION INTRAVENOUS at 13:46

## 2019-11-05 RX ADMIN — FENTANYL CITRATE 25 MCG: 50 INJECTION, SOLUTION INTRAMUSCULAR; INTRAVENOUS at 14:53

## 2019-11-05 RX ADMIN — KETAMINE HYDROCHLORIDE 50 MG: 10 INJECTION INTRAMUSCULAR; INTRAVENOUS at 13:03

## 2019-11-05 RX ADMIN — FAMOTIDINE 20 MG: 20 TABLET ORAL at 22:27

## 2019-11-05 ASSESSMENT — PULMONARY FUNCTION TESTS
PIF_VALUE: 18
PIF_VALUE: 24
PIF_VALUE: 24
PIF_VALUE: 17
PIF_VALUE: 25
PIF_VALUE: 24
PIF_VALUE: 13
PIF_VALUE: 17
PIF_VALUE: 24
PIF_VALUE: 23
PIF_VALUE: 25
PIF_VALUE: 17
PIF_VALUE: 25
PIF_VALUE: 24
PIF_VALUE: 25
PIF_VALUE: 23
PIF_VALUE: 22
PIF_VALUE: 25
PIF_VALUE: 17
PIF_VALUE: 17
PIF_VALUE: 0
PIF_VALUE: 13
PIF_VALUE: 0
PIF_VALUE: 24
PIF_VALUE: 25
PIF_VALUE: 24
PIF_VALUE: 25
PIF_VALUE: 17
PIF_VALUE: 21
PIF_VALUE: 17
PIF_VALUE: 23
PIF_VALUE: 24
PIF_VALUE: 17
PIF_VALUE: 23
PIF_VALUE: 8
PIF_VALUE: 17
PIF_VALUE: 24
PIF_VALUE: 17
PIF_VALUE: 25
PIF_VALUE: 17
PIF_VALUE: 17
PIF_VALUE: 23
PIF_VALUE: 26
PIF_VALUE: 3
PIF_VALUE: 13
PIF_VALUE: 1
PIF_VALUE: 25
PIF_VALUE: 13
PIF_VALUE: 23
PIF_VALUE: 17
PIF_VALUE: 24
PIF_VALUE: 15
PIF_VALUE: 17
PIF_VALUE: 13
PIF_VALUE: 1
PIF_VALUE: 17
PIF_VALUE: 22
PIF_VALUE: 17
PIF_VALUE: 1
PIF_VALUE: 1
PIF_VALUE: 24
PIF_VALUE: 23
PIF_VALUE: 13
PIF_VALUE: 23
PIF_VALUE: 12
PIF_VALUE: 24
PIF_VALUE: 1
PIF_VALUE: 17
PIF_VALUE: 24
PIF_VALUE: 17
PIF_VALUE: 12
PIF_VALUE: 24
PIF_VALUE: 17
PIF_VALUE: 13
PIF_VALUE: 23
PIF_VALUE: 15
PIF_VALUE: 4
PIF_VALUE: 24
PIF_VALUE: 12
PIF_VALUE: 23
PIF_VALUE: 1
PIF_VALUE: 23
PIF_VALUE: 0
PIF_VALUE: 23
PIF_VALUE: 24
PIF_VALUE: 17
PIF_VALUE: 17
PIF_VALUE: 22

## 2019-11-05 ASSESSMENT — PAIN DESCRIPTION - PAIN TYPE
TYPE: SURGICAL PAIN
TYPE: SURGICAL PAIN

## 2019-11-05 ASSESSMENT — PAIN SCALES - GENERAL
PAINLEVEL_OUTOF10: 9
PAINLEVEL_OUTOF10: 5
PAINLEVEL_OUTOF10: 0
PAINLEVEL_OUTOF10: 7
PAINLEVEL_OUTOF10: 3
PAINLEVEL_OUTOF10: 5
PAINLEVEL_OUTOF10: 0
PAINLEVEL_OUTOF10: 6
PAINLEVEL_OUTOF10: 9
PAINLEVEL_OUTOF10: 6
PAINLEVEL_OUTOF10: 4
PAINLEVEL_OUTOF10: 6
PAINLEVEL_OUTOF10: 7

## 2019-11-05 ASSESSMENT — PAIN DESCRIPTION - ORIENTATION: ORIENTATION: RIGHT

## 2019-11-06 LAB
ANION GAP SERPL CALCULATED.3IONS-SCNC: 10 MMOL/L (ref 4–16)
ANISOCYTOSIS: ABNORMAL
BANDED NEUTROPHILS ABSOLUTE COUNT: 1.13 K/CU MM
BANDED NEUTROPHILS RELATIVE PERCENT: 25 % (ref 5–11)
BUN BLDV-MCNC: 14 MG/DL (ref 6–23)
CALCIUM SERPL-MCNC: 7.8 MG/DL (ref 8.3–10.6)
CHLORIDE BLD-SCNC: 105 MMOL/L (ref 99–110)
CO2: 29 MMOL/L (ref 21–32)
COMPONENT: NORMAL
CREAT SERPL-MCNC: 0.3 MG/DL (ref 0.6–1.1)
DIFFERENTIAL TYPE: ABNORMAL
GFR AFRICAN AMERICAN: >60 ML/MIN/1.73M2
GFR NON-AFRICAN AMERICAN: >60 ML/MIN/1.73M2
GLUCOSE BLD-MCNC: 101 MG/DL (ref 70–99)
GLUCOSE BLD-MCNC: 107 MG/DL (ref 70–99)
GLUCOSE BLD-MCNC: 118 MG/DL (ref 70–99)
GLUCOSE BLD-MCNC: 121 MG/DL (ref 70–99)
GLUCOSE BLD-MCNC: 142 MG/DL (ref 70–99)
HCT VFR BLD CALC: 30.1 % (ref 37–47)
HEMOGLOBIN: 9.4 GM/DL (ref 12.5–16)
LYMPHOCYTES ABSOLUTE: 0.4 K/CU MM
LYMPHOCYTES RELATIVE PERCENT: 8 % (ref 24–44)
MCH RBC QN AUTO: 29.4 PG (ref 27–31)
MCHC RBC AUTO-ENTMCNC: 31.2 % (ref 32–36)
MCV RBC AUTO: 94.1 FL (ref 78–100)
METAMYELOCYTES ABSOLUTE COUNT: 0.27 K/CU MM
METAMYELOCYTES PERCENT: 6 %
MONOCYTES ABSOLUTE: 0.4 K/CU MM
MONOCYTES RELATIVE PERCENT: 9 % (ref 0–4)
MYELOCYTE PERCENT: 2 %
MYELOCYTES ABSOLUTE COUNT: 0.09 K/CU MM
NUCLEATED RED BLOOD CELLS: 17
PDW BLD-RTO: 19 % (ref 11.7–14.9)
PLATELET # BLD: 100 K/CU MM (ref 140–440)
PMV BLD AUTO: 9.3 FL (ref 7.5–11.1)
POLYCHROMASIA: ABNORMAL
POTASSIUM SERPL-SCNC: 3.3 MMOL/L (ref 3.5–5.1)
RBC # BLD: 3.2 M/CU MM (ref 4.2–5.4)
RBC # BLD: ABNORMAL 10*6/UL
SEGMENTED NEUTROPHILS ABSOLUTE COUNT: 2.2 K/CU MM
SEGMENTED NEUTROPHILS RELATIVE PERCENT: 50 % (ref 36–66)
SODIUM BLD-SCNC: 144 MMOL/L (ref 135–145)
STATUS: NORMAL
TRANSFUSION STATUS: NORMAL
UNIT DIVISION: 0
UNIT NUMBER: NORMAL
WBC # BLD: 4.5 K/CU MM (ref 4–10.5)
WBC # BLD: ABNORMAL 10*3/UL

## 2019-11-06 PROCEDURE — 97530 THERAPEUTIC ACTIVITIES: CPT

## 2019-11-06 PROCEDURE — 85027 COMPLETE CBC AUTOMATED: CPT

## 2019-11-06 PROCEDURE — 97116 GAIT TRAINING THERAPY: CPT

## 2019-11-06 PROCEDURE — 6360000002 HC RX W HCPCS: Performed by: ORTHOPAEDIC SURGERY

## 2019-11-06 PROCEDURE — 97166 OT EVAL MOD COMPLEX 45 MIN: CPT

## 2019-11-06 PROCEDURE — 80048 BASIC METABOLIC PNL TOTAL CA: CPT

## 2019-11-06 PROCEDURE — 97535 SELF CARE MNGMENT TRAINING: CPT

## 2019-11-06 PROCEDURE — 97163 PT EVAL HIGH COMPLEX 45 MIN: CPT

## 2019-11-06 PROCEDURE — 6370000000 HC RX 637 (ALT 250 FOR IP): Performed by: ORTHOPAEDIC SURGERY

## 2019-11-06 PROCEDURE — 85007 BL SMEAR W/DIFF WBC COUNT: CPT

## 2019-11-06 PROCEDURE — 6360000002 HC RX W HCPCS: Performed by: INTERNAL MEDICINE

## 2019-11-06 PROCEDURE — 1200000000 HC SEMI PRIVATE

## 2019-11-06 PROCEDURE — 82962 GLUCOSE BLOOD TEST: CPT

## 2019-11-06 RX ORDER — FUROSEMIDE 10 MG/ML
20 INJECTION INTRAMUSCULAR; INTRAVENOUS 2 TIMES DAILY
Status: DISCONTINUED | OUTPATIENT
Start: 2019-11-06 | End: 2019-11-08 | Stop reason: HOSPADM

## 2019-11-06 RX ORDER — ACETAMINOPHEN 80 MG
TABLET,CHEWABLE ORAL
Status: COMPLETED
Start: 2019-11-06 | End: 2019-11-06

## 2019-11-06 RX ADMIN — NADOLOL 80 MG: 20 TABLET ORAL at 09:20

## 2019-11-06 RX ADMIN — FAMOTIDINE 20 MG: 20 TABLET ORAL at 08:32

## 2019-11-06 RX ADMIN — POTASSIUM CHLORIDE 20 MEQ: 20 TABLET, EXTENDED RELEASE ORAL at 09:22

## 2019-11-06 RX ADMIN — MULTIPLE VITAMINS W/ MINERALS TAB 1 TABLET: TAB at 09:20

## 2019-11-06 RX ADMIN — POTASSIUM CHLORIDE 20 MEQ: 20 TABLET, EXTENDED RELEASE ORAL at 23:14

## 2019-11-06 RX ADMIN — FUROSEMIDE 20 MG: 10 INJECTION, SOLUTION INTRAVENOUS at 17:48

## 2019-11-06 RX ADMIN — CALCIUM POLYCARBOPHIL 625 MG TABLET 625 MG: at 23:13

## 2019-11-06 RX ADMIN — DEXAMETHASONE 2 MG: 4 TABLET ORAL at 09:19

## 2019-11-06 RX ADMIN — Medication: at 09:19

## 2019-11-06 RX ADMIN — MORPHINE SULFATE 30 MG: 15 TABLET ORAL at 06:52

## 2019-11-06 RX ADMIN — CEFAZOLIN SODIUM 1 G: 1 INJECTION, SOLUTION INTRAVENOUS at 05:23

## 2019-11-06 RX ADMIN — COLLAGENASE SANTYL: 250 OINTMENT TOPICAL at 09:23

## 2019-11-06 RX ADMIN — FOLIC ACID 1 MG: 1 TABLET ORAL at 08:32

## 2019-11-06 RX ADMIN — CETIRIZINE HYDROCHLORIDE 5 MG: 10 TABLET, FILM COATED ORAL at 09:19

## 2019-11-06 RX ADMIN — ASPIRIN 81 MG 81 MG: 81 TABLET ORAL at 17:45

## 2019-11-06 RX ADMIN — ONDANSETRON 8 MG: 4 TABLET, ORALLY DISINTEGRATING ORAL at 09:22

## 2019-11-06 RX ADMIN — Medication 400 UNITS: at 09:23

## 2019-11-06 RX ADMIN — SENNOSIDES 17.2 MG: 8.6 TABLET, FILM COATED ORAL at 17:45

## 2019-11-06 RX ADMIN — OXYCODONE HYDROCHLORIDE AND ACETAMINOPHEN 500 MG: 500 TABLET ORAL at 09:21

## 2019-11-06 RX ADMIN — FAMOTIDINE 20 MG: 20 TABLET ORAL at 23:14

## 2019-11-06 RX ADMIN — CALCIUM POLYCARBOPHIL 625 MG TABLET 625 MG: at 09:23

## 2019-11-06 RX ADMIN — CEFAZOLIN SODIUM 1 G: 1 INJECTION, SOLUTION INTRAVENOUS at 13:35

## 2019-11-06 ASSESSMENT — PAIN SCALES - GENERAL
PAINLEVEL_OUTOF10: 6
PAINLEVEL_OUTOF10: 7

## 2019-11-07 LAB
ANION GAP SERPL CALCULATED.3IONS-SCNC: 7 MMOL/L (ref 4–16)
ANISOCYTOSIS: ABNORMAL
BANDED NEUTROPHILS ABSOLUTE COUNT: 0.72 K/CU MM
BANDED NEUTROPHILS RELATIVE PERCENT: 16 % (ref 5–11)
BASOPHILIC STIPPLING: PRESENT
BUN BLDV-MCNC: 13 MG/DL (ref 6–23)
CALCIUM SERPL-MCNC: 8.3 MG/DL (ref 8.3–10.6)
CHLORIDE BLD-SCNC: 107 MMOL/L (ref 99–110)
CO2: 30 MMOL/L (ref 21–32)
CREAT SERPL-MCNC: 0.4 MG/DL (ref 0.6–1.1)
DIFFERENTIAL TYPE: ABNORMAL
GFR AFRICAN AMERICAN: >60 ML/MIN/1.73M2
GFR NON-AFRICAN AMERICAN: >60 ML/MIN/1.73M2
GLUCOSE BLD-MCNC: 115 MG/DL (ref 70–99)
GLUCOSE BLD-MCNC: 136 MG/DL (ref 70–99)
GLUCOSE BLD-MCNC: 153 MG/DL (ref 70–99)
GLUCOSE BLD-MCNC: 85 MG/DL (ref 70–99)
GLUCOSE BLD-MCNC: 89 MG/DL (ref 70–99)
HCT VFR BLD CALC: 29.2 % (ref 37–47)
HEMOGLOBIN: 8.9 GM/DL (ref 12.5–16)
LYMPHOCYTES ABSOLUTE: 0.1 K/CU MM
LYMPHOCYTES RELATIVE PERCENT: 2 % (ref 24–44)
MCH RBC QN AUTO: 29.4 PG (ref 27–31)
MCHC RBC AUTO-ENTMCNC: 30.5 % (ref 32–36)
MCV RBC AUTO: 96.4 FL (ref 78–100)
METAMYELOCYTES ABSOLUTE COUNT: 0.09 K/CU MM
METAMYELOCYTES PERCENT: 2 %
MONOCYTES ABSOLUTE: 0.2 K/CU MM
MONOCYTES RELATIVE PERCENT: 4 % (ref 0–4)
MYELOCYTE PERCENT: 1 %
MYELOCYTES ABSOLUTE COUNT: 0.05 K/CU MM
NUCLEATED RED BLOOD CELLS: 20
PDW BLD-RTO: 19.5 % (ref 11.7–14.9)
PLATELET # BLD: ABNORMAL K/CU MM (ref 140–440)
PLATELET # BLD: ABNORMAL K/CU MM (ref 140–440)
PLT MORPHOLOGY: ABNORMAL
PMV BLD AUTO: 10.1 FL (ref 7.5–11.1)
POLYCHROMASIA: ABNORMAL
POTASSIUM SERPL-SCNC: 3.4 MMOL/L (ref 3.5–5.1)
RBC # BLD: 3.03 M/CU MM (ref 4.2–5.4)
SEGMENTED NEUTROPHILS ABSOLUTE COUNT: 3.3 K/CU MM
SEGMENTED NEUTROPHILS RELATIVE PERCENT: 75 % (ref 36–66)
SODIUM BLD-SCNC: 144 MMOL/L (ref 135–145)
WBC # BLD: 4.5 K/CU MM (ref 4–10.5)

## 2019-11-07 PROCEDURE — 80048 BASIC METABOLIC PNL TOTAL CA: CPT

## 2019-11-07 PROCEDURE — 97530 THERAPEUTIC ACTIVITIES: CPT

## 2019-11-07 PROCEDURE — 97110 THERAPEUTIC EXERCISES: CPT

## 2019-11-07 PROCEDURE — 97116 GAIT TRAINING THERAPY: CPT

## 2019-11-07 PROCEDURE — 85007 BL SMEAR W/DIFF WBC COUNT: CPT

## 2019-11-07 PROCEDURE — 6370000000 HC RX 637 (ALT 250 FOR IP): Performed by: ORTHOPAEDIC SURGERY

## 2019-11-07 PROCEDURE — 82962 GLUCOSE BLOOD TEST: CPT

## 2019-11-07 PROCEDURE — 2700000000 HC OXYGEN THERAPY PER DAY

## 2019-11-07 PROCEDURE — 1200000000 HC SEMI PRIVATE

## 2019-11-07 PROCEDURE — 6360000002 HC RX W HCPCS: Performed by: ORTHOPAEDIC SURGERY

## 2019-11-07 PROCEDURE — 6360000002 HC RX W HCPCS: Performed by: INTERNAL MEDICINE

## 2019-11-07 PROCEDURE — 94761 N-INVAS EAR/PLS OXIMETRY MLT: CPT

## 2019-11-07 PROCEDURE — 85027 COMPLETE CBC AUTOMATED: CPT

## 2019-11-07 RX ADMIN — MORPHINE SULFATE 30 MG: 15 TABLET ORAL at 06:45

## 2019-11-07 RX ADMIN — FUROSEMIDE 20 MG: 10 INJECTION, SOLUTION INTRAVENOUS at 16:55

## 2019-11-07 RX ADMIN — FUROSEMIDE 20 MG: 10 INJECTION, SOLUTION INTRAVENOUS at 08:48

## 2019-11-07 RX ADMIN — CALCIUM POLYCARBOPHIL 625 MG TABLET 625 MG: at 08:47

## 2019-11-07 RX ADMIN — DEXAMETHASONE 2 MG: 4 TABLET ORAL at 08:48

## 2019-11-07 RX ADMIN — CALCIUM POLYCARBOPHIL 625 MG TABLET 625 MG: at 21:05

## 2019-11-07 RX ADMIN — CETIRIZINE HYDROCHLORIDE 5 MG: 10 TABLET, FILM COATED ORAL at 08:48

## 2019-11-07 RX ADMIN — ONDANSETRON 8 MG: 4 TABLET, ORALLY DISINTEGRATING ORAL at 08:50

## 2019-11-07 RX ADMIN — COLLAGENASE SANTYL: 250 OINTMENT TOPICAL at 08:49

## 2019-11-07 RX ADMIN — NADOLOL 80 MG: 20 TABLET ORAL at 08:46

## 2019-11-07 RX ADMIN — POTASSIUM CHLORIDE 20 MEQ: 20 TABLET, EXTENDED RELEASE ORAL at 21:05

## 2019-11-07 RX ADMIN — OXYCODONE HYDROCHLORIDE AND ACETAMINOPHEN 500 MG: 500 TABLET ORAL at 08:47

## 2019-11-07 RX ADMIN — FAMOTIDINE 20 MG: 20 TABLET ORAL at 08:47

## 2019-11-07 RX ADMIN — ONDANSETRON 8 MG: 4 TABLET, ORALLY DISINTEGRATING ORAL at 08:47

## 2019-11-07 RX ADMIN — Medication 400 UNITS: at 08:47

## 2019-11-07 RX ADMIN — FOLIC ACID 1 MG: 1 TABLET ORAL at 08:48

## 2019-11-07 RX ADMIN — ASPIRIN 81 MG 81 MG: 81 TABLET ORAL at 16:55

## 2019-11-07 RX ADMIN — SENNOSIDES 17.2 MG: 8.6 TABLET, FILM COATED ORAL at 16:55

## 2019-11-07 RX ADMIN — POTASSIUM CHLORIDE 20 MEQ: 20 TABLET, EXTENDED RELEASE ORAL at 08:47

## 2019-11-07 RX ADMIN — MULTIPLE VITAMINS W/ MINERALS TAB 1 TABLET: TAB at 08:48

## 2019-11-07 RX ADMIN — FAMOTIDINE 20 MG: 20 TABLET ORAL at 21:05

## 2019-11-07 ASSESSMENT — PAIN SCALES - GENERAL
PAINLEVEL_OUTOF10: 3
PAINLEVEL_OUTOF10: 0

## 2019-11-07 ASSESSMENT — PAIN DESCRIPTION - LOCATION: LOCATION: HIP

## 2019-11-07 ASSESSMENT — PAIN DESCRIPTION - ORIENTATION: ORIENTATION: RIGHT

## 2019-11-07 ASSESSMENT — PAIN DESCRIPTION - PAIN TYPE: TYPE: SURGICAL PAIN

## 2019-11-08 VITALS
BODY MASS INDEX: 38.68 KG/M2 | DIASTOLIC BLOOD PRESSURE: 57 MMHG | RESPIRATION RATE: 16 BRPM | OXYGEN SATURATION: 98 % | TEMPERATURE: 97.9 F | WEIGHT: 197 LBS | SYSTOLIC BLOOD PRESSURE: 103 MMHG | HEART RATE: 91 BPM | HEIGHT: 60 IN

## 2019-11-08 LAB
ABO/RH: NORMAL
ANION GAP SERPL CALCULATED.3IONS-SCNC: 8 MMOL/L (ref 4–16)
ANISOCYTOSIS: ABNORMAL
ANTIBODY SCREEN: NEGATIVE
BANDED NEUTROPHILS ABSOLUTE COUNT: 0.47 K/CU MM
BANDED NEUTROPHILS RELATIVE PERCENT: 15 % (ref 5–11)
BASOPHILIC STIPPLING: PRESENT
BUN BLDV-MCNC: 12 MG/DL (ref 6–23)
CALCIUM SERPL-MCNC: ABNORMAL MG/DL (ref 8.3–10.6)
CHLORIDE BLD-SCNC: 111 MMOL/L (ref 99–110)
CO2: 24 MMOL/L (ref 21–32)
COMPONENT: NORMAL
CREAT SERPL-MCNC: 0.3 MG/DL (ref 0.6–1.1)
CROSSMATCH RESULT: NORMAL
DIFFERENTIAL TYPE: ABNORMAL
GFR AFRICAN AMERICAN: >60 ML/MIN/1.73M2
GFR NON-AFRICAN AMERICAN: >60 ML/MIN/1.73M2
GLUCOSE BLD-MCNC: 121 MG/DL (ref 70–99)
GLUCOSE BLD-MCNC: 74 MG/DL (ref 70–99)
GLUCOSE BLD-MCNC: 93 MG/DL (ref 70–99)
HCT VFR BLD CALC: 26.9 % (ref 37–47)
HEMOGLOBIN: 8.2 GM/DL (ref 12.5–16)
LYMPHOCYTES ABSOLUTE: 0.2 K/CU MM
LYMPHOCYTES RELATIVE PERCENT: 7 % (ref 24–44)
MAGNESIUM: 1.2 MG/DL (ref 1.8–2.4)
MCH RBC QN AUTO: 29.7 PG (ref 27–31)
MCHC RBC AUTO-ENTMCNC: 30.5 % (ref 32–36)
MCV RBC AUTO: 97.5 FL (ref 78–100)
METAMYELOCYTES ABSOLUTE COUNT: 0.06 K/CU MM
METAMYELOCYTES PERCENT: 2 %
MONOCYTES ABSOLUTE: 0.2 K/CU MM
MONOCYTES RELATIVE PERCENT: 8 % (ref 0–4)
MYELOCYTE PERCENT: 1 %
MYELOCYTES ABSOLUTE COUNT: 0.03 K/CU MM
NUCLEATED RED BLOOD CELLS: 9
PDW BLD-RTO: 19.5 % (ref 11.7–14.9)
PLATELET # BLD: ABNORMAL K/CU MM (ref 140–440)
PLT MORPHOLOGY: ABNORMAL
PMV BLD AUTO: 10.5 FL (ref 7.5–11.1)
POLYCHROMASIA: ABNORMAL
POTASSIUM SERPL-SCNC: ABNORMAL MMOL/L (ref 3.5–5.1)
RBC # BLD: 2.76 M/CU MM (ref 4.2–5.4)
SEGMENTED NEUTROPHILS ABSOLUTE COUNT: 2.1 K/CU MM
SEGMENTED NEUTROPHILS RELATIVE PERCENT: 67 % (ref 36–66)
SODIUM BLD-SCNC: 143 MMOL/L (ref 135–145)
STATUS: NORMAL
TRANSFUSION STATUS: NORMAL
UNIT DIVISION: 0
UNIT NUMBER: NORMAL
WBC # BLD: 3.1 K/CU MM (ref 4–10.5)

## 2019-11-08 PROCEDURE — 82962 GLUCOSE BLOOD TEST: CPT

## 2019-11-08 PROCEDURE — 97530 THERAPEUTIC ACTIVITIES: CPT

## 2019-11-08 PROCEDURE — 6370000000 HC RX 637 (ALT 250 FOR IP): Performed by: ORTHOPAEDIC SURGERY

## 2019-11-08 PROCEDURE — 6360000002 HC RX W HCPCS: Performed by: INTERNAL MEDICINE

## 2019-11-08 PROCEDURE — 85007 BL SMEAR W/DIFF WBC COUNT: CPT

## 2019-11-08 PROCEDURE — 85027 COMPLETE CBC AUTOMATED: CPT

## 2019-11-08 PROCEDURE — 83735 ASSAY OF MAGNESIUM: CPT

## 2019-11-08 PROCEDURE — 6370000000 HC RX 637 (ALT 250 FOR IP): Performed by: INTERNAL MEDICINE

## 2019-11-08 PROCEDURE — 97110 THERAPEUTIC EXERCISES: CPT

## 2019-11-08 PROCEDURE — 80048 BASIC METABOLIC PNL TOTAL CA: CPT

## 2019-11-08 PROCEDURE — 97116 GAIT TRAINING THERAPY: CPT

## 2019-11-08 PROCEDURE — 6360000002 HC RX W HCPCS: Performed by: ORTHOPAEDIC SURGERY

## 2019-11-08 RX ORDER — POLYETHYLENE GLYCOL 3350 17 G/17G
17 POWDER, FOR SOLUTION ORAL 2 TIMES DAILY PRN
Qty: 527 G | Refills: 1 | Status: SHIPPED | OUTPATIENT
Start: 2019-11-08 | End: 2019-12-08

## 2019-11-08 RX ORDER — HYDROCODONE BITARTRATE AND ACETAMINOPHEN 7.5; 325 MG/1; MG/1
1 TABLET ORAL EVERY 6 HOURS PRN
Qty: 12 TABLET | Refills: 0 | Status: SHIPPED | OUTPATIENT
Start: 2019-11-08 | End: 2019-11-11

## 2019-11-08 RX ORDER — SENNA PLUS 8.6 MG/1
2 TABLET ORAL EVERY EVENING
Qty: 60 TABLET | Refills: 0 | Status: SHIPPED | OUTPATIENT
Start: 2019-11-08 | End: 2019-12-08

## 2019-11-08 RX ORDER — FUROSEMIDE 20 MG/1
20 TABLET ORAL DAILY
Qty: 60 TABLET | Refills: 3 | Status: SHIPPED | OUTPATIENT
Start: 2019-11-08

## 2019-11-08 RX ORDER — MORPHINE SULFATE 30 MG/1
30 TABLET ORAL EVERY 6 HOURS PRN
Qty: 12 TABLET | Refills: 0 | Status: SHIPPED | OUTPATIENT
Start: 2019-11-08 | End: 2019-11-08 | Stop reason: HOSPADM

## 2019-11-08 RX ORDER — POTASSIUM CHLORIDE 20 MEQ/1
40 TABLET, EXTENDED RELEASE ORAL ONCE
Status: COMPLETED | OUTPATIENT
Start: 2019-11-08 | End: 2019-11-08

## 2019-11-08 RX ORDER — HYDROCHLOROTHIAZIDE 12.5 MG/1
12.5 TABLET ORAL DAILY
Qty: 30 TABLET | Refills: 1 | Status: SHIPPED | OUTPATIENT
Start: 2019-11-08 | End: 2020-01-03 | Stop reason: ALTCHOICE

## 2019-11-08 RX ORDER — NADOLOL 80 MG/1
80 TABLET ORAL DAILY
Qty: 30 TABLET | Refills: 3 | Status: SHIPPED | OUTPATIENT
Start: 2019-11-09 | End: 2020-02-14

## 2019-11-08 RX ORDER — MORPHINE SULFATE 30 MG/1
30 TABLET ORAL
Qty: 5 TABLET | Refills: 0 | Status: SHIPPED | OUTPATIENT
Start: 2019-11-09 | End: 2019-11-14

## 2019-11-08 RX ORDER — HYDROCODONE BITARTRATE AND ACETAMINOPHEN 7.5; 325 MG/1; MG/1
1 TABLET ORAL EVERY 6 HOURS PRN
Qty: 12 TABLET | Refills: 0 | Status: SHIPPED | OUTPATIENT
Start: 2019-11-08 | End: 2019-11-08 | Stop reason: SDUPTHER

## 2019-11-08 RX ADMIN — ONDANSETRON 8 MG: 4 TABLET, ORALLY DISINTEGRATING ORAL at 08:46

## 2019-11-08 RX ADMIN — Medication 400 UNITS: at 08:43

## 2019-11-08 RX ADMIN — OXYCODONE HYDROCHLORIDE AND ACETAMINOPHEN 500 MG: 500 TABLET ORAL at 08:43

## 2019-11-08 RX ADMIN — ONDANSETRON 8 MG: 4 TABLET, ORALLY DISINTEGRATING ORAL at 08:44

## 2019-11-08 RX ADMIN — COLLAGENASE SANTYL: 250 OINTMENT TOPICAL at 08:45

## 2019-11-08 RX ADMIN — POTASSIUM CHLORIDE 40 MEQ: 20 TABLET, EXTENDED RELEASE ORAL at 09:53

## 2019-11-08 RX ADMIN — CETIRIZINE HYDROCHLORIDE 5 MG: 10 TABLET, FILM COATED ORAL at 08:44

## 2019-11-08 RX ADMIN — DEXAMETHASONE 2 MG: 4 TABLET ORAL at 08:43

## 2019-11-08 RX ADMIN — MULTIPLE VITAMINS W/ MINERALS TAB 1 TABLET: TAB at 08:43

## 2019-11-08 RX ADMIN — FAMOTIDINE 20 MG: 20 TABLET ORAL at 08:44

## 2019-11-08 RX ADMIN — FUROSEMIDE 20 MG: 10 INJECTION, SOLUTION INTRAVENOUS at 08:45

## 2019-11-08 RX ADMIN — FOLIC ACID 1 MG: 1 TABLET ORAL at 08:45

## 2019-11-08 RX ADMIN — MORPHINE SULFATE 30 MG: 15 TABLET ORAL at 06:47

## 2019-11-08 RX ADMIN — CALCIUM POLYCARBOPHIL 625 MG TABLET 625 MG: at 08:43

## 2019-11-08 RX ADMIN — POTASSIUM CHLORIDE 20 MEQ: 20 TABLET, EXTENDED RELEASE ORAL at 08:43

## 2019-11-08 ASSESSMENT — PAIN DESCRIPTION - PAIN TYPE: TYPE: SURGICAL PAIN

## 2019-11-08 ASSESSMENT — PAIN SCALES - GENERAL
PAINLEVEL_OUTOF10: 0
PAINLEVEL_OUTOF10: 0
PAINLEVEL_OUTOF10: 2
PAINLEVEL_OUTOF10: 2
PAINLEVEL_OUTOF10: 0

## 2019-11-08 ASSESSMENT — PAIN DESCRIPTION - DESCRIPTORS: DESCRIPTORS: DISCOMFORT

## 2019-11-08 ASSESSMENT — PAIN DESCRIPTION - ORIENTATION: ORIENTATION: RIGHT

## 2019-11-08 ASSESSMENT — PAIN DESCRIPTION - LOCATION: LOCATION: HIP

## 2019-11-14 ENCOUNTER — HOSPITAL ENCOUNTER (OUTPATIENT)
Dept: INFUSION THERAPY | Age: 69
Setting detail: INFUSION SERIES
Discharge: HOME | End: 2019-11-14
Payer: COMMERCIAL

## 2019-11-14 VITALS
RESPIRATION RATE: 16 BRPM | HEIGHT: 60 IN | SYSTOLIC BLOOD PRESSURE: 101 MMHG | BODY MASS INDEX: 30.23 KG/M2 | HEART RATE: 86 BPM | OXYGEN SATURATION: 99 % | WEIGHT: 154 LBS | DIASTOLIC BLOOD PRESSURE: 59 MMHG | TEMPERATURE: 97.3 F

## 2019-11-14 PROCEDURE — 99211 OFF/OP EST MAY X REQ PHY/QHP: CPT

## 2019-11-14 PROCEDURE — 36430 TRANSFUSION BLD/BLD COMPNT: CPT

## 2019-11-14 PROCEDURE — 96374 THER/PROPH/DIAG INJ IV PUSH: CPT

## 2019-11-14 PROCEDURE — 2580000003 HC RX 258: Performed by: INTERNAL MEDICINE

## 2019-11-14 PROCEDURE — P9016 RBC LEUKOCYTES REDUCED: HCPCS

## 2019-11-14 PROCEDURE — 86900 BLOOD TYPING SEROLOGIC ABO: CPT

## 2019-11-14 PROCEDURE — 6360000002 HC RX W HCPCS: Performed by: INTERNAL MEDICINE

## 2019-11-14 PROCEDURE — 86850 RBC ANTIBODY SCREEN: CPT

## 2019-11-14 PROCEDURE — 86922 COMPATIBILITY TEST ANTIGLOB: CPT

## 2019-11-14 PROCEDURE — 86901 BLOOD TYPING SEROLOGIC RH(D): CPT

## 2019-11-14 PROCEDURE — 6370000000 HC RX 637 (ALT 250 FOR IP): Performed by: INTERNAL MEDICINE

## 2019-11-14 RX ORDER — SODIUM CHLORIDE 0.9 % (FLUSH) 0.9 %
10 SYRINGE (ML) INJECTION PRN
Status: DISCONTINUED | OUTPATIENT
Start: 2019-11-14 | End: 2019-11-15 | Stop reason: HOSPADM

## 2019-11-14 RX ORDER — 0.9 % SODIUM CHLORIDE 0.9 %
250 INTRAVENOUS SOLUTION INTRAVENOUS ONCE
Status: COMPLETED | OUTPATIENT
Start: 2019-11-14 | End: 2019-11-14

## 2019-11-14 RX ORDER — FUROSEMIDE 10 MG/ML
20 INJECTION INTRAMUSCULAR; INTRAVENOUS SEE ADMIN INSTRUCTIONS
Status: DISCONTINUED | OUTPATIENT
Start: 2019-11-14 | End: 2019-11-15 | Stop reason: HOSPADM

## 2019-11-14 RX ORDER — HEPARIN SODIUM (PORCINE) LOCK FLUSH IV SOLN 100 UNIT/ML 100 UNIT/ML
500 SOLUTION INTRAVENOUS PRN
Status: DISCONTINUED | OUTPATIENT
Start: 2019-11-14 | End: 2019-11-15 | Stop reason: HOSPADM

## 2019-11-14 RX ORDER — HYDROCODONE BITARTRATE AND ACETAMINOPHEN 7.5; 325 MG/1; MG/1
1 TABLET ORAL EVERY 6 HOURS PRN
COMMUNITY

## 2019-11-14 RX ORDER — ACETAMINOPHEN 325 MG/1
650 TABLET ORAL SEE ADMIN INSTRUCTIONS
Status: COMPLETED | OUTPATIENT
Start: 2019-11-14 | End: 2019-11-14

## 2019-11-14 RX ORDER — DIPHENHYDRAMINE HCL 25 MG
25 TABLET ORAL SEE ADMIN INSTRUCTIONS
Status: COMPLETED | OUTPATIENT
Start: 2019-11-14 | End: 2019-11-14

## 2019-11-14 RX ORDER — METHYLPREDNISOLONE SODIUM SUCCINATE 40 MG/ML
20 INJECTION, POWDER, LYOPHILIZED, FOR SOLUTION INTRAMUSCULAR; INTRAVENOUS ONCE
Status: COMPLETED | OUTPATIENT
Start: 2019-11-14 | End: 2019-11-14

## 2019-11-14 RX ADMIN — Medication 10 ML: at 08:30

## 2019-11-14 RX ADMIN — DIPHENHYDRAMINE HYDROCHLORIDE 25 MG: 25 TABLET ORAL at 09:20

## 2019-11-14 RX ADMIN — SODIUM CHLORIDE 250 ML: 9 INJECTION, SOLUTION INTRAVENOUS at 09:20

## 2019-11-14 RX ADMIN — METHYLPREDNISOLONE SODIUM SUCCINATE 20 MG: 40 INJECTION, POWDER, FOR SOLUTION INTRAMUSCULAR; INTRAVENOUS at 09:20

## 2019-11-14 RX ADMIN — Medication 20 ML: at 12:25

## 2019-11-14 RX ADMIN — Medication 500 UNITS: at 12:25

## 2019-11-14 RX ADMIN — ACETAMINOPHEN 650 MG: 325 TABLET ORAL at 09:20

## 2019-11-14 ASSESSMENT — PAIN SCALES - GENERAL: PAINLEVEL_OUTOF10: 0

## 2019-11-14 NOTE — PLAN OF CARE
To unit room 4 for Blood Transfusion per Wheelchair from [facility]. Assisted to Recliner. Tolerated well. Orientated to unit. Procedure and plan of care explained. Questions answered. Understanding verbalized.

## 2019-12-02 ENCOUNTER — HOSPITAL ENCOUNTER (OUTPATIENT)
Age: 69
Setting detail: SPECIMEN
Discharge: HOME | End: 2019-12-02
Payer: MEDICARE

## 2019-12-02 LAB
ALBUMIN SERPL-MCNC: 2.8 GM/DL (ref 3.4–5)
ALP BLD-CCNC: 746 IU/L (ref 40–128)
ALT SERPL-CCNC: 75 U/L (ref 10–40)
ANION GAP SERPL CALCULATED.3IONS-SCNC: 9 MMOL/L (ref 4–16)
AST SERPL-CCNC: 69 IU/L (ref 15–37)
BILIRUB SERPL-MCNC: 1.3 MG/DL (ref 0–1)
BUN BLDV-MCNC: 21 MG/DL (ref 6–23)
CALCIUM SERPL-MCNC: 7.9 MG/DL (ref 8.3–10.6)
CHLORIDE BLD-SCNC: 96 MMOL/L (ref 99–110)
CO2: 31 MMOL/L (ref 21–32)
CREAT SERPL-MCNC: 0.4 MG/DL (ref 0.6–1.1)
GFR AFRICAN AMERICAN: >60 ML/MIN/1.73M2
GFR NON-AFRICAN AMERICAN: >60 ML/MIN/1.73M2
GLUCOSE BLD-MCNC: 113 MG/DL (ref 70–99)
POTASSIUM SERPL-SCNC: 3.1 MMOL/L (ref 3.5–5.1)
SODIUM BLD-SCNC: 136 MMOL/L (ref 135–145)
TOTAL PROTEIN: 4.8 GM/DL (ref 6.4–8.2)

## 2019-12-02 PROCEDURE — 80053 COMPREHEN METABOLIC PANEL: CPT

## 2019-12-20 ENCOUNTER — HOSPITAL ENCOUNTER (OUTPATIENT)
Age: 69
Setting detail: SPECIMEN
Discharge: HOME | End: 2019-12-20
Payer: MEDICARE

## 2019-12-20 LAB
ALBUMIN SERPL-MCNC: 2.8 GM/DL (ref 3.4–5)
ALP BLD-CCNC: 451 IU/L (ref 40–129)
ALT SERPL-CCNC: 62 U/L (ref 10–40)
ANION GAP SERPL CALCULATED.3IONS-SCNC: 9 MMOL/L (ref 4–16)
AST SERPL-CCNC: 55 IU/L (ref 15–37)
BILIRUB SERPL-MCNC: 0.7 MG/DL (ref 0–1)
BUN BLDV-MCNC: 19 MG/DL (ref 6–23)
CALCIUM SERPL-MCNC: 7.9 MG/DL (ref 8.3–10.6)
CHLORIDE BLD-SCNC: 101 MMOL/L (ref 99–110)
CO2: 28 MMOL/L (ref 21–32)
CREAT SERPL-MCNC: 0.5 MG/DL (ref 0.6–1.1)
GFR AFRICAN AMERICAN: >60 ML/MIN/1.73M2
GFR NON-AFRICAN AMERICAN: >60 ML/MIN/1.73M2
GLUCOSE BLD-MCNC: 184 MG/DL (ref 70–99)
POTASSIUM SERPL-SCNC: 4.2 MMOL/L (ref 3.5–5.1)
SODIUM BLD-SCNC: 138 MMOL/L (ref 135–145)
TOTAL PROTEIN: 4.8 GM/DL (ref 6.4–8.2)

## 2019-12-20 PROCEDURE — 80053 COMPREHEN METABOLIC PANEL: CPT

## 2020-01-03 ENCOUNTER — HOSPITAL ENCOUNTER (OUTPATIENT)
Dept: WOUND CARE | Age: 70
Discharge: HOME | End: 2020-01-03
Payer: MEDICARE

## 2020-01-03 VITALS
RESPIRATION RATE: 16 BRPM | DIASTOLIC BLOOD PRESSURE: 62 MMHG | SYSTOLIC BLOOD PRESSURE: 96 MMHG | HEART RATE: 83 BPM | TEMPERATURE: 97.4 F

## 2020-01-03 PROBLEM — L89.42 PRESSURE ULCER OF CONTIGUOUS REGION INVOLVING BACK AND LEFT BUTTOCK, STAGE 2 (HCC): Status: ACTIVE | Noted: 2020-01-03

## 2020-01-03 PROBLEM — L89.323 PRESSURE INJURY OF LEFT BUTTOCK, STAGE 3 (HCC): Status: ACTIVE | Noted: 2020-01-03

## 2020-01-03 PROBLEM — L89.42 PRESSURE ULCER OF CONTIGUOUS REGION INVOLVING BACK AND RIGHT BUTTOCK, STAGE 2 (HCC): Status: ACTIVE | Noted: 2020-01-03

## 2020-01-03 PROBLEM — L89.42 PRESSURE INJURY OF CONTIGUOUS REGION INVOLVING BACK AND RIGHT BUTTOCK, STAGE 2 (HCC): Status: ACTIVE | Noted: 2020-01-03

## 2020-01-03 PROBLEM — L89.42 PRESSURE ULCER OF CONTIGUOUS REGION INVOLVING BACK AND RIGHT BUTTOCK, STAGE 2 (HCC): Status: RESOLVED | Noted: 2020-01-03 | Resolved: 2020-01-03

## 2020-01-03 PROCEDURE — 99214 OFFICE O/P EST MOD 30 MIN: CPT

## 2020-01-03 RX ORDER — MORPHINE SULFATE 15 MG/1
15 TABLET ORAL 2 TIMES DAILY
COMMUNITY

## 2020-01-03 NOTE — PROGRESS NOTES
that ruptured and said I was bleeding internally\"/ then back age 2 ( 6) did exploratory surgery for hilario colon \"    APPENDECTOMY  age 3    BONE BIOPSY N/A 03/14/2019    IR    BREAST SURGERY Left 12/2014    Masectomy    COLONOSCOPY  03/2006    Normal exam    COLONOSCOPY  10/04/2017    mild sigmoid divertics    HEMIARTHROPLASTY HIP Left 9/28/2019    HIP HEMIARTHROPLASTY performed by Prabhu Amin MD at [facility] Right 11/5/2019    HIP HEMIARTHROPLASTY performed by Prabhu Amin MD at [facility]      Removed 7/2016( inserted in 2015)       FAMILY HISTORY    Family History   Problem Relation Age of Onset    High Blood Pressure Mother     Arthritis Mother     Diabetes Father     Asthma Father         COPD    Heart Disease Father        SOCIAL HISTORY    Social History     Tobacco Use    Smoking status: Never Smoker    Smokeless tobacco: Never Used   Substance Use Topics    Alcohol use: Not Currently     Comment: per pt on 3/13/2019\"use to drink average one glass per week- none recently\"    Drug use: No       ALLERGIES    Allergies   Allergen Reactions    Sulfa Antibiotics Other (See Comments)     As child unsure of the reaction       MEDICATIONS    Current Outpatient Medications on File Prior to Encounter   Medication Sig Dispense Refill    morphine (MSIR) 15 MG tablet Take 15 mg by mouth 2 times daily.  HYDROcodone-acetaminophen (NORCO) 7.5-325 MG per tablet Take 1 tablet by mouth every 6 hours as needed for Pain.       nadolol (CORGARD) 80 MG tablet Take 1 tablet by mouth daily 30 tablet 3    furosemide (LASIX) 20 MG tablet Take 1 tablet by mouth daily As needed for swelling 60 tablet 3    potassium chloride (KLOR-CON M) 20 MEQ TBCR extended release tablet Take 20 mEq by mouth 2 times daily      Alpelisib, 200 MG Daily Dose, (PIQRAY, 200 MG DAILY DOSE,) 200 MG TBPK Take 300 mg by mouth daily (with breakfast)      see wound description below in procedure note      Assessment:       Adelfo Acharya  appears to have a non-healing wound of the buttocks. The etiology of the wound is felt to be pressure. There are multiple complicating factors including chronic pressure, decreased mobility and immunosuppression. A comprehensive wound management program would be helpful to heal this wound. Assessments completed include fall risk and nutritional, functional,and psychological status. At this time appropriate care would include: periodic debridement and wound care as below.        Problem List Items Addressed This Visit     WD-Pressure injury of left buttock, stage 3 ([address]    WD-Pressure injury of contiguous region involving back and right buttock, stage 2 ([address]    WD-Pressure ulcer of contiguous region involving back and left buttock, stage 2 ([address]    RESOLVED: WD-Pressure ulcer of contiguous region involving back and right buttock, stage 2 ([address]          Wound 11/04/19 Buttocks Left;Upper (Active)   Number of days: 60       Wound 01/03/20 Buttocks Left;Proximal #1 (Active)   Wound Image   1/3/2020  8:34 AM   Wound Cleansed Rinsed/Irrigated with saline 1/3/2020  8:34 AM   Wound Length (cm) 1.2 cm 1/3/2020  8:34 AM   Wound Width (cm) 0.7 cm 1/3/2020  8:34 AM   Wound Depth (cm) 1.9 cm 1/3/2020  8:34 AM   Wound Surface Area (cm^2) 0.84 cm^2 1/3/2020  8:34 AM   Wound Volume (cm^3) 1.6 cm^3 1/3/2020  8:34 AM   Distance Tunneling (cm) 0.7 cm 1/3/2020  8:34 AM   Tunneling Position ___ O'Clock 12 1/3/2020  8:34 AM   Undermining Starts ___ O'Clock 0 1/3/2020  8:34 AM   Undermining Ends___ O'Clock 0 1/3/2020  8:34 AM   Undermining Maxium Distance (cm) 0 1/3/2020  8:34 AM   Wound Assessment Pink 1/3/2020  8:34 AM   Drainage Amount Moderate 1/3/2020  8:34 AM   Drainage Description Serosanguinous 1/3/2020  8:34 AM   Odor None 1/3/2020  8:34 AM   Margins Defined edges 1/3/2020  8:34 AM   Jackelin-wound Assessment Red 1/3/2020  8:34 AM   Non-staged Wound Description Full thickness 1/3/2020  8:34 AM   [place]%Wound Bed 0 1/3/2020  8:34 AM   Red%Wound Bed 20 1/3/2020  8:34 AM   Yellow%Wound Bed 80 1/3/2020  8:34 AM   Black%Wound Bed 0 1/3/2020  8:34 AM   Purple%Wound Bed 0 1/3/2020  8:34 AM   Other%Wound Bed 0 1/3/2020  8:34 AM   Number of days: 0       Wound 01/03/20 Buttocks Left #2 CLUSTER (Active)   Wound Image   1/3/2020  8:34 AM   Wound Cleansed Rinsed/Irrigated with saline 1/3/2020  8:34 AM   Wound Length (cm) 3 cm 1/3/2020  8:34 AM   Wound Width (cm) 2 cm 1/3/2020  8:34 AM   Wound Depth (cm) 0.1 cm 1/3/2020  8:34 AM   Wound Surface Area (cm^2) 6 cm^2 1/3/2020  8:34 AM   Wound Volume (cm^3) 0.6 cm^3 1/3/2020  8:34 AM   Distance Tunneling (cm) 0 cm 1/3/2020  8:34 AM   Tunneling Position ___ O'Clock 0 1/3/2020  8:34 AM   Undermining Starts ___ O'Clock 0 1/3/2020  8:34 AM   Undermining Ends___ O'Clock 0 1/3/2020  8:34 AM   Undermining Maxium Distance (cm) 0 1/3/2020  8:34 AM   Wound Assessment Red;Yellow 1/3/2020  8:34 AM   Drainage Amount Moderate 1/3/2020  8:34 AM   Drainage Description Serosanguinous 1/3/2020  8:34 AM   Odor None 1/3/2020  8:34 AM   Margins Defined edges 1/3/2020  8:34 AM   Jackelin-wound Assessment Pink 1/3/2020  8:34 AM   Non-staged Wound Description Full thickness 1/3/2020  8:34 AM   [place]%Wound Bed 70 1/3/2020  8:34 AM   Red%Wound Bed 0 1/3/2020  8:34 AM   Yellow%Wound Bed 30 1/3/2020  8:34 AM   Black%Wound Bed 0 1/3/2020  8:34 AM   Purple%Wound Bed 0 1/3/2020  8:34 AM   Number of days: 0       Wound 01/03/20 Buttocks Right #3 (Active)   Wound Image   1/3/2020  8:34 AM   Wound Cleansed Rinsed/Irrigated with saline 1/3/2020  8:34 AM   Wound Length (cm) 3.3 cm 1/3/2020  8:34 AM   Wound Width (cm) 2.5 cm 1/3/2020  8:34 AM   Wound Depth (cm) 0.1 cm 1/3/2020  8:34 AM   Wound Surface Area (cm^2) 8.25 cm^2 1/3/2020  8:34 AM   Wound Volume (cm^3) 0.82 cm^3 1/3/2020  8:34 AM   Distance Tunneling (cm) 0 cm 1/3/2020  8:34 AM   Tunneling Position ___ Father     Asthma Father         COPD    Heart Disease Father        SOCIAL HISTORY    Social History     Tobacco Use    Smoking status: Never Smoker    Smokeless tobacco: Never Used   Substance Use Topics    Alcohol use: Not Currently     Comment: per pt on 3/13/2019\"use to drink average one glass per week- none recently\"    Drug use: No       ALLERGIES    Allergies   Allergen Reactions    Sulfa Antibiotics Other (See Comments)     As child unsure of the reaction       MEDICATIONS    Current Outpatient Medications on File Prior to Encounter   Medication Sig Dispense Refill    morphine (MSIR) 15 MG tablet Take 15 mg by mouth 2 times daily.  HYDROcodone-acetaminophen (NORCO) 7.5-325 MG per tablet Take 1 tablet by mouth every 6 hours as needed for Pain.       nadolol (CORGARD) 80 MG tablet Take 1 tablet by mouth daily 30 tablet 3    furosemide (LASIX) 20 MG tablet Take 1 tablet by mouth daily As needed for swelling 60 tablet 3    potassium chloride (KLOR-CON M) 20 MEQ TBCR extended release tablet Take 20 mEq by mouth 2 times daily      Alpelisib, 200 MG Daily Dose, (PIQRAY, 200 MG DAILY DOSE,) 200 MG TBPK Take 300 mg by mouth daily (with breakfast)      OLANZapine (ZYPREXA) 2.5 MG tablet Take 2.5 mg by mouth nightly Takes during Chemo      folic acid (FOLVITE) 1 MG tablet Take 800 mcg by mouth daily      dexamethasone (DECADRON) 2 MG tablet Take 1 mg by mouth every other day Alternating with 4mg every other day       dexamethasone (DECADRON) 4 MG tablet Take 4 mg by mouth every other day Alternate 4mg and 2 mg tabs every other day      Mastectomy Bra MISC by Does not apply route 4 each 0    Breast Prosthesis MISC by Does not apply route 1 each 1    ondansetron (ZOFRAN) 8 MG tablet Take 8 mg by mouth every 8 hours as needed for Nausea or Vomiting      vitamin C (ASCORBIC ACID) 500 MG tablet Take 500 mg by mouth daily      omeprazole (PRILOSEC) 20 MG delayed release capsule Take 20 mg by mouth daily       Multiple Vitamin (MULTI VITAMIN DAILY PO) Take by mouth      vitamin E 400 UNIT capsule Take 400 Units by mouth daily      aspirin 81 MG tablet Take 81 mg by mouth every other day       Loratadine 10 MG CAPS Take by mouth daily       acetaminophen (TYLENOL) 325 MG tablet Take 650 mg by mouth every 6 hours as needed for Pain       No current facility-administered medications on file prior to encounter. PROBLEM LIST    Patient Active Problem List   Diagnosis    [facility]    S/P left mastectomy    [facility]    WD-Pressure injury of left buttock, stage 3 ([address]    WD-Pressure injury of contiguous region involving back and right buttock, stage 2 ([address]    WD-Pressure ulcer of contiguous region involving back and left buttock, stage 2 ([facility])       REVIEW OF SYSTEMS          Objective:      BP 96/62   Pulse 83   Temp 97.4 °F (36.3 °C) (Temporal)   Resp 16   LMP 01/13/2005     PHYSICAL EXAM  Constitutional: Negative for systemic symptoms including fever, chills and malaise. Dermatologic exam: Visual inspection of the periwound reveals the skin to be normal in turgor and texture  Wound exam: see wound description below in procedure note      Assessment:       Vinny You  appears to have a non-healing wound of the buttoks. The etiology of the wound is felt to be pressure. There are multiple complicating factors including chronic pressure, decreased mobility and immunosuppression. A comprehensive wound management program would be helpful to heal this wound. Assessments completed include fall risk and nutritional, functional,and psychological status. At this time appropriate care would include: periodic debridement and wound care as below.        Problem List Items Addressed This Visit     WD-Pressure injury of left buttock, stage 3 ([facility]    WD-Pressure injury of contiguous region involving back and right buttock, stage 2 ([address] O'Clock 0 1/3/2020  8:34 AM   Undermining Starts ___ O'Clock 0 1/3/2020  8:34 AM   Undermining Ends___ O'Clock 0 1/3/2020  8:34 AM   Undermining Maxium Distance (cm) 0 1/3/2020  8:34 AM   Wound Assessment Red;Yellow 1/3/2020  8:34 AM   Drainage Amount Moderate 1/3/2020  8:34 AM   Drainage Description Serosanguinous 1/3/2020  8:34 AM   Odor None 1/3/2020  8:34 AM   Margins Defined edges 1/3/2020  8:34 AM   Jackelin-wound Assessment Pink 1/3/2020  8:34 AM   Non-staged Wound Description Full thickness 1/3/2020  8:34 AM   [place]%Wound Bed 70 1/3/2020  8:34 AM   Red%Wound Bed 0 1/3/2020  8:34 AM   Yellow%Wound Bed 30 1/3/2020  8:34 AM   Black%Wound Bed 0 1/3/2020  8:34 AM   Purple%Wound Bed 0 1/3/2020  8:34 AM   Number of days: 0       Wound 01/03/20 Buttocks Right #3 (Active)   Wound Image   1/3/2020  8:34 AM   Wound Cleansed Rinsed/Irrigated with saline 1/3/2020  8:34 AM   Wound Length (cm) 3.3 cm 1/3/2020  8:34 AM   Wound Width (cm) 2.5 cm 1/3/2020  8:34 AM   Wound Depth (cm) 0.1 cm 1/3/2020  8:34 AM   Wound Surface Area (cm^2) 8.25 cm^2 1/3/2020  8:34 AM   Wound Volume (cm^3) 0.82 cm^3 1/3/2020  8:34 AM   Distance Tunneling (cm) 0 cm 1/3/2020  8:34 AM   Tunneling Position ___ O'Clock 0 1/3/2020  8:34 AM   Undermining Starts ___ O'Clock 0 1/3/2020  8:34 AM   Undermining Ends___ O'Clock 0 1/3/2020  8:34 AM   Undermining Maxium Distance (cm) 0 1/3/2020  8:34 AM   Drainage Amount Moderate 1/3/2020  8:34 AM   Drainage Description Serosanguinous 1/3/2020  8:34 AM   Odor None 1/3/2020  8:34 AM   Margins Defined edges 1/3/2020  8:34 AM   Jackelin-wound Assessment Pink 1/3/2020  8:34 AM   Non-staged Wound Description Full thickness 1/3/2020  8:34 AM   [place]%Wound Bed 50 1/3/2020  8:34 AM   Red%Wound Bed 0 1/3/2020  8:34 AM   Yellow%Wound Bed 50 1/3/2020  8:34 AM   Black%Wound Bed 0 1/3/2020  8:34 AM   Purple%Wound Bed 0 1/3/2020  8:34 AM   Other%Wound Bed 0 1/3/2020  8:34 AM   Number of days: 0         Plan:     Discharge instructions:    Discharge Instructions       PHYSICIAN ORDERS AND DISCHARGE INSTRUCTIONS    NOTE: Upon discharge from the [address], you will receive a patient experience survey. We would be grateful if you would take the time to fill this survey out. Wound care order history:     TOÑO's   N/A--buttock wound   Vascular studies: done in 11/19    Imaging:    Cultures:    Labs/ HbA1c: 5.8 in 11/19   Grafts:    HBO: To be determined    Antibiotics:               Earlier Wound care treatments:              Authorizations:    Consults:     Primary care physician: Vivian Mendoza     Continuing wound care orders and information:              Residence:  Private              Continue home health care with: [facility]    Your wound-care supplies will be provided by: [facility]    DME provider:   Compression with N/A   Off loading:  N/A   Wound Medications:    Wound cleansing:      Do not scrub or use excessive force. Wash hands with soap and water before and after dressing changes. Prior to applying a clean dressing, cleanse wound with normal saline,          wound cleanser, or mild soap and water. Ask the physician or nurse before getting the wound(s) wet in a shower   Daily Wound management:    Keep weight off wounds and reposition every 2 hours. Avoid standing for long periods of time. Apply wraps/stockings in AM and remove at bedtime. If swelling is present, elevate legs to the level of the heart or above for 30  minutes 4-5 times a day and/or when sitting. When taking antibiotics take entire prescription as ordered by physician do not stop taking until medicine is all gone. Orders for this week (1/3/20):     Proximal buttock wound- Wash with mild soap and water. Gently pack with plain 1/4 in packing strips, add several drops to betadine to strip. Cover with fluffed 4x4, and secure with island border. Change daily. Buttock cluster- Wash with mild soap and water. Paint with betadine, cover with island border gauze. Change Daily. Look into hospital bed and new seat coushin     Follow up with Dr Ruben Field. In 1 week in the wound care center  Call [phone] for any questions or concerns. Date__________   Time____________                      Treatment Note      Written Patient Dismissal Instructions Given            Electronically signed by Sanjuana Rodríguez MD on 1/3/2020 at 9:32 [facility] Initial Visit      Adelfo Acharya  AGE: 77 y.o. GENDER: female  : 1950  EPISODE DATE:  1/3/2020   Referred by:Erickson Lim    Subjective:     CHIEF COMPLAINT prssre sores     HISTORY of PRESENT ILLNESS      Adelfo Acharya is a 77 y.o. female who presents to the [address] [place] [address] for an initial visit for evaluation and treatment of Chronic pressure  ulcer(s) of  Buttocks L gluteal, Buttocks R gluteal.  The condition is of marked severity. The ulcer has been present for 24 weeks. The underlying cause is thought to be prssure. The patients care to date has included dressings. The patient has significant underlying medical conditions as below.      Wound Pain Timing/Severity: none  Quality of pain: N/A  Severity of pain:  0 / 10   Modifying Factors: chronic pressure, decreased mobility and immunosuppression  Associated Signs/Symptoms: none        PAST MEDICAL HISTORY        Diagnosis Date    Anemia     Arthritis     left hip    Cancer ([facility] 2014    breast cancer( left)- tx with surgery, chemo and radiation- follows with Dr Abiodun Webb and Dr Jose Angel Mena Diverticulosis     Edema     Esophagitis     GERD (gastroesophageal reflux disease)     Glaucoma     \"they say I have in the left eye but no treatment yet\"    Hip fracture ([facility]     History of blood transfusion     \"in 1974 after surgery    History of kidney stones     \"with the testing shows I have small kidney stones both kidneys but no trouble with them\"    Hypertension     Lymphedema     Metastatic O'Clock 0 1/3/2020  8:34 AM   Undermining Ends___ O'Clock 0 1/3/2020  8:34 AM   Undermining Maxium Distance (cm) 0 1/3/2020  8:34 AM   Wound Assessment Pink 1/3/2020  8:34 AM   Drainage Amount Moderate 1/3/2020  8:34 AM   Drainage Description Serosanguinous 1/3/2020  8:34 AM   Odor None 1/3/2020  8:34 AM   Margins Defined edges 1/3/2020  8:34 AM   Jackelin-wound Assessment Red 1/3/2020  8:34 AM   Non-staged Wound Description Full thickness 1/3/2020  8:34 AM   [place]%Wound Bed 0 1/3/2020  8:34 AM   Red%Wound Bed 20 1/3/2020  8:34 AM   Yellow%Wound Bed 80 1/3/2020  8:34 AM   Black%Wound Bed 0 1/3/2020  8:34 AM   Purple%Wound Bed 0 1/3/2020  8:34 AM   Other%Wound Bed 0 1/3/2020  8:34 AM   Number of days: 0       Wound 01/03/20 Buttocks Left #2 CLUSTER (Active)   Wound Image   1/3/2020  8:34 AM   Wound Cleansed Rinsed/Irrigated with saline 1/3/2020  8:34 AM   Wound Length (cm) 3 cm 1/3/2020  8:34 AM   Wound Width (cm) 2 cm 1/3/2020  8:34 AM   Wound Depth (cm) 0.1 cm 1/3/2020  8:34 AM   Wound Surface Area (cm^2) 6 cm^2 1/3/2020  8:34 AM   Wound Volume (cm^3) 0.6 cm^3 1/3/2020  8:34 AM   Distance Tunneling (cm) 0 cm 1/3/2020  8:34 AM   Tunneling Position ___ O'Clock 0 1/3/2020  8:34 AM   Undermining Starts ___ O'Clock 0 1/3/2020  8:34 AM   Undermining Ends___ O'Clock 0 1/3/2020  8:34 AM   Undermining Maxium Distance (cm) 0 1/3/2020  8:34 AM   Wound Assessment Red;Yellow 1/3/2020  8:34 AM   Drainage Amount Moderate 1/3/2020  8:34 AM   Drainage Description Serosanguinous 1/3/2020  8:34 AM   Odor None 1/3/2020  8:34 AM   Margins Defined edges 1/3/2020  8:34 AM   Jackelin-wound Assessment Pink 1/3/2020  8:34 AM   Non-staged Wound Description Full thickness 1/3/2020  8:34 AM   [place]%Wound Bed 70 1/3/2020  8:34 AM   Red%Wound Bed 0 1/3/2020  8:34 AM   Yellow%Wound Bed 30 1/3/2020  8:34 AM   Black%Wound Bed 0 1/3/2020  8:34 AM   Purple%Wound Bed 0 1/3/2020  8:34 AM   Number of days: 0       Wound 01/03/20 Buttocks Right #3 (Active)   Wound Image   1/3/2020  8:34 AM   Wound Cleansed Rinsed/Irrigated with saline 1/3/2020  8:34 AM   Wound Length (cm) 3.3 cm 1/3/2020  8:34 AM   Wound Width (cm) 2.5 cm 1/3/2020  8:34 AM   Wound Depth (cm) 0.1 cm 1/3/2020  8:34 AM   Wound Surface Area (cm^2) 8.25 cm^2 1/3/2020  8:34 AM   Wound Volume (cm^3) 0.82 cm^3 1/3/2020  8:34 AM   Distance Tunneling (cm) 0 cm 1/3/2020  8:34 AM   Tunneling Position ___ O'Clock 0 1/3/2020  8:34 AM   Undermining Starts ___ O'Clock 0 1/3/2020  8:34 AM   Undermining Ends___ O'Clock 0 1/3/2020  8:34 AM   Undermining Maxium Distance (cm) 0 1/3/2020  8:34 AM   Drainage Amount Moderate 1/3/2020  8:34 AM   Drainage Description Serosanguinous 1/3/2020  8:34 AM   Odor None 1/3/2020  8:34 AM   Margins Defined edges 1/3/2020  8:34 AM   Jackelin-wound Assessment Pink 1/3/2020  8:34 AM   Non-staged Wound Description Full thickness 1/3/2020  8:34 AM   [place]%Wound Bed 50 1/3/2020  8:34 AM   Red%Wound Bed 0 1/3/2020  8:34 AM   Yellow%Wound Bed 50 1/3/2020  8:34 AM   Black%Wound Bed 0 1/3/2020  8:34 AM   Purple%Wound Bed 0 1/3/2020  8:34 AM   Other%Wound Bed 0 1/3/2020  8:34 AM   Number of days: 0         Plan:     Discharge instructions:    Discharge Instructions       PHYSICIAN ORDERS AND DISCHARGE INSTRUCTIONS    NOTE: Upon discharge from the [address], you will receive a patient experience survey. We would be grateful if you would take the time to fill this survey out. Wound care order history:     TOÑO's   N/A--buttock wound   Vascular studies: done in 11/19    Imaging:    Cultures:    Labs/ HbA1c: 5.8 in 11/19   Grafts:    HBO: To be determined    Antibiotics:               Earlier Wound care treatments:              Authorizations:    Consults:     Primary care physician: Yemi Hollins     Continuing wound care orders and information:              Residence:  Private              Continue home health care with: [facility]    Your wound-care supplies will be provided by:  [facility]    DME

## 2020-01-10 ENCOUNTER — HOSPITAL ENCOUNTER (OUTPATIENT)
Dept: WOUND CARE | Age: 70
Discharge: HOME | End: 2020-01-10
Payer: MEDICARE

## 2020-01-10 VITALS
TEMPERATURE: 97.1 F | SYSTOLIC BLOOD PRESSURE: 95 MMHG | RESPIRATION RATE: 16 BRPM | HEART RATE: 84 BPM | DIASTOLIC BLOOD PRESSURE: 60 MMHG

## 2020-01-10 PROBLEM — L97.919 VENOUS STASIS ULCER OF RIGHT LOWER LEG WITH EDEMA OF RIGHT LOWER LEG (HCC): Status: ACTIVE | Noted: 2020-01-10

## 2020-01-10 PROBLEM — I83.019 VENOUS STASIS ULCER OF RIGHT LOWER LEG WITH EDEMA OF RIGHT LOWER LEG (HCC): Status: ACTIVE | Noted: 2020-01-10

## 2020-01-10 PROBLEM — R60.9 VENOUS STASIS ULCER OF RIGHT LOWER LEG WITH EDEMA OF RIGHT LOWER LEG (HCC): Status: ACTIVE | Noted: 2020-01-10

## 2020-01-10 PROBLEM — I83.891 VENOUS STASIS ULCER OF RIGHT LOWER LEG WITH EDEMA OF RIGHT LOWER LEG (HCC): Status: ACTIVE | Noted: 2020-01-10

## 2020-01-10 PROCEDURE — 11045 DBRDMT SUBQ TISS EACH ADDL: CPT

## 2020-01-10 PROCEDURE — 11042 DBRDMT SUBQ TIS 1ST 20SQCM/<: CPT

## 2020-01-10 NOTE — PLAN OF CARE
Problem: Wound:  Goal: Will show signs of wound healing; wound closure and no evidence of infection  Description  Will show signs of wound healing; wound closure and no evidence of infection  Outcome: Ongoing  Note:   See Flowsheet     Problem: Wound:  Intervention: Assess ankle, calf, or foot circumference blilaterally  Note:   See Flowsheet  Intervention: Assess pain status  Note:   See Flowsheet  Intervention: Assess wound size, appearance and drainage  Note:   See Flowsheet  Intervention: Assess pedal pulses bilaterally if patient has a foot or leg ulcer  Note:   See Flowsheet  Intervention: Doppler if unable to palpate pedal pulse  Note:   See Flowsheet

## 2020-01-10 NOTE — PROGRESS NOTES
\"in 1974 did exploratory surgery- found I had cyst on right ovary that ruptured and said I was bleeding internally\"/ then back age 2 ( 6) did exploratory surgery for hilario colon \"    APPENDECTOMY  age 3    BONE BIOPSY N/A 03/14/2019    IR    BREAST SURGERY Left 12/2014    Masectomy    COLONOSCOPY  03/2006    Normal exam    COLONOSCOPY  10/04/2017    mild sigmoid divertics    HEMIARTHROPLASTY HIP Left 9/28/2019    HIP HEMIARTHROPLASTY performed by Romain Gomez MD at [address] [facility] [address] Right 11/5/2019    HIP HEMIARTHROPLASTY performed by Romain Gomez MD at [address] [facility]      Removed 7/2016( inserted in 2015)       FAMILY HISTORY    Family History   Problem Relation Age of Onset    High Blood Pressure Mother     Arthritis Mother     Diabetes Father     Asthma Father         COPD    Heart Disease Father        SOCIAL HISTORY    Social History     Tobacco Use    Smoking status: Never Smoker    Smokeless tobacco: Never Used   Substance Use Topics    Alcohol use: Not Currently     Comment: per pt on 3/13/2019\"use to drink average one glass per week- none recently\"    Drug use: No       ALLERGIES    Allergies   Allergen Reactions    Sulfa Antibiotics Other (See Comments)     As child unsure of the reaction       MEDICATIONS    Current Outpatient Medications on File Prior to Encounter   Medication Sig Dispense Refill    morphine (MSIR) 15 MG tablet Take 15 mg by mouth 2 times daily.       nadolol (CORGARD) 80 MG tablet Take 1 tablet by mouth daily 30 tablet 3    furosemide (LASIX) 20 MG tablet Take 1 tablet by mouth daily As needed for swelling 60 tablet 3    potassium chloride (KLOR-CON M) 20 MEQ TBCR extended release tablet Take 20 mEq by mouth 2 times daily      Alpelisib, 200 MG Daily Dose, (PIQRAY, 200 MG DAILY DOSE,) 200 MG TBPK Take 150 mg by mouth daily (with breakfast)       OLANZapine (ZYPREXA) 2.5 MG tablet Take 2.5 mg by mouth injury of left buttock, stage 3 (HCC)    WD-Pressure injury of contiguous region involving back and right buttock, stage 2 ([address] - Primary    WD-Pressure ulcer of contiguous region involving back and left buttock, stage 2 (HCC)    WD-Venous stasis ulcer of right lower leg with edema of right lower leg ([address]        Procedure Note    Indications:  Based on my examination of this patient's wound(s) today, sharp excision into necrotic subcutaneous tissue is required to promote healing and evaluate the extent of previous healing. Performed by: Sanjuana Rodríguez MD    Consent obtained: Yes    Time out taken:  Yes    Pain Control:  Anesthetic  Anesthetic: 4% Lidocaine Liquid Topical     Debridement:Excisional Debridement    Using curette and # 10 blade scalpel the wound(s) was/were sharply debrided down through and including the removal of subcutaneous tissue.         Devitalized Tissue Debrided:  biofilm, slough and necrotic/eschar    Pre Debridement Measurements:  Are located in the Wound Documentation Flow Sheet    All active wounds listed below with today's date are evaluated  Wound(s)    debrided this date include # : 1, 2, 3 and 4     Post  Debridement Measurements:  Wound 11/04/19 Buttocks Left;Upper (Active)   Number of days: 67       Wound 01/03/20 #1 Left proximal Buttock (Active)   Wound Image   1/3/2020  8:34 AM   Wound Pressure Stage  3 1/10/2020  9:52 AM   Wound Cleansed Rinsed/Irrigated with saline 1/10/2020  9:52 AM   Wound Length (cm) 1.5 cm 1/10/2020  9:52 AM   Wound Width (cm) 0.7 cm 1/10/2020  9:52 AM   Wound Depth (cm) 1.5 cm 1/10/2020  9:52 AM   Wound Surface Area (cm^2) 1.05 cm^2 1/10/2020  9:52 AM   Change in Wound Size % (l*w) -25 1/10/2020  9:52 AM   Wound Volume (cm^3) 1.58 cm^3 1/10/2020  9:52 AM   Wound Healing % 1 1/10/2020  9:52 AM   Post-Procedure Length (cm) 1.5 cm 1/10/2020 10:56 AM   Post-Procedure Width (cm) 0.7 cm 1/10/2020 10:56 AM   Post-Procedure Depth (cm) 1.5 cm 1/10/2020 10:56 AM Post-Procedure Surface Area (cm^2) 1.05 cm^2 1/10/2020 10:56 AM   Post-Procedure Volume (cm^3) 1.58 cm^3 1/10/2020 10:56 AM   Distance Tunneling (cm) 0.8 cm 1/10/2020  9:52 AM   Tunneling Position ___ O'Clock 12 1/10/2020  9:52 AM   Undermining Starts ___ O'Clock 0 1/10/2020  9:52 AM   Undermining Ends___ O'Clock 0 1/10/2020  9:52 AM   Undermining Maxium Distance (cm) 0 1/10/2020  9:52 AM   Wound Assessment Pink 1/10/2020  9:52 AM   Drainage Amount Moderate 1/10/2020  9:52 AM   Drainage Description Serosanguinous 1/10/2020  9:52 AM   Odor None 1/10/2020  9:52 AM   Margins Defined edges 1/10/2020  9:52 AM   Jackelin-wound Assessment Red 1/10/2020  9:52 AM   Non-staged Wound Description Full thickness 1/10/2020  9:52 AM   [place]%Wound Bed 0 1/3/2020  8:34 AM   Red%Wound Bed 20 1/3/2020  8:34 AM   Yellow%Wound Bed 80 1/3/2020  8:34 AM   Black%Wound Bed 0 1/3/2020  8:34 AM   Purple%Wound Bed 0 1/3/2020  8:34 AM   Other%Wound Bed 0 1/3/2020  8:34 AM   Number of days: 7       Wound 01/03/20 #2 Left Buttock Cluster (Active)   Wound Image   1/3/2020  8:34 AM   Wound Pressure Stage  2 1/10/2020  9:52 AM   Wound Cleansed Rinsed/Irrigated with saline 1/10/2020  9:52 AM   Wound Length (cm) 1.3 cm 1/10/2020  9:52 AM   Wound Width (cm) 1 cm 1/10/2020  9:52 AM   Wound Depth (cm) 0.1 cm 1/10/2020  9:52 AM   Wound Surface Area (cm^2) 1.3 cm^2 1/10/2020  9:52 AM   Change in Wound Size % (l*w) 78.33 1/10/2020  9:52 AM   Wound Volume (cm^3) 0.13 cm^3 1/10/2020  9:52 AM   Wound Healing % 78 1/10/2020  9:52 AM   Distance Tunneling (cm) 0 cm 1/10/2020  9:52 AM   Tunneling Position ___ O'Clock 0 1/10/2020  9:52 AM   Undermining Starts ___ O'Clock 0 1/10/2020  9:52 AM   Undermining Ends___ O'Clock 0 1/10/2020  9:52 AM   Undermining Maxium Distance (cm) 0 1/10/2020  9:52 AM   Wound Assessment Red;Yellow 1/10/2020  9:52 AM   Drainage Amount Moderate 1/10/2020  9:52 AM   Drainage Description Serosanguinous 1/10/2020  9:52 AM   Odor None 1/10/2020 Wound Length (cm) 16.5 cm 1/10/2020  9:52 AM   Wound Width (cm) 15 cm 1/10/2020  9:52 AM   Wound Depth (cm) 0.1 cm 1/10/2020  9:52 AM   Wound Surface Area (cm^2) 247.5 cm^2 1/10/2020  9:52 AM   Wound Volume (cm^3) 24.75 cm^3 1/10/2020  9:52 AM   Post-Procedure Length (cm) 16.5 cm 1/10/2020 10:39 AM   Post-Procedure Width (cm) 15 cm 1/10/2020 10:39 AM   Post-Procedure Depth (cm) 0.1 cm 1/10/2020 10:39 AM   Post-Procedure Surface Area (cm^2) 247.5 cm^2 1/10/2020 10:39 AM   Post-Procedure Volume (cm^3) 24.75 cm^3 1/10/2020 10:39 AM   Distance Tunneling (cm) 0 cm 1/10/2020  9:52 AM   Tunneling Position ___ O'Clock 0 1/10/2020  9:52 AM   Undermining Starts ___ O'Clock 0 1/10/2020  9:52 AM   Undermining Ends___ O'Clock 0 1/10/2020  9:52 AM   Undermining Maxium Distance (cm) 0 1/10/2020  9:52 AM   Wound Assessment Pink 1/10/2020  9:52 AM   Drainage Amount Moderate 1/10/2020  9:52 AM   Drainage Description Yellow 1/10/2020  9:52 AM   Odor None 1/10/2020  9:52 AM   Margins Undefined edges 1/10/2020  9:52 AM   Jackelin-wound Assessment Pink 1/10/2020  9:52 AM   Non-staged Wound Description Full thickness 1/10/2020  9:52 AM   [place]%Wound Bed 100 1/10/2020  9:52 AM   Red%Wound Bed 0 1/10/2020  9:52 AM   Yellow%Wound Bed 0 1/10/2020  9:52 AM   Black%Wound Bed 0 1/10/2020  9:52 AM   Purple%Wound Bed 0 1/10/2020  9:52 AM   Other%Wound Bed 0 1/10/2020  9:52 AM   Number of days: 0       Percent of Wound(s) Debrided: approximately 100%    Total Surface Area Debrided:  256.05 sq cm     Bleeding:  Minimal    Hemostasis Achieved:  by pressure    Procedural Pain:  4  / 10     Post Procedural Pain:  0 / 10     Response to treatment:  Well tolerated by patient., With complaints of pain. Wound is has significantly worsened    Plan:       Discharge Instructions         Discharge Instructions        PHYSICIAN ORDERS AND DISCHARGE INSTRUCTIONS     NOTE: Upon discharge from the [address], you will receive a patient experience survey.  We would be grateful if you would take the time to fill this survey out.     Wound care order history:                 TOÑO's   N/A--buttock wound              Vascular studies: done in 11/19               Imaging:               Cultures:               Labs/ HbA1c: 5.8 in 11/19              Grafts:               HBO: To be determined               Antibiotics:               Earlier Wound care treatments:              Authorizations:               Consults:                           Primary care physician: Lawrence Garcia      Continuing wound care orders and information:              Residence:  Private              Continue home health care with: [facility]               Your wound-care supplies will be provided by: [facility]               DME provider:              Compression with N/A              Off loading:  N/A              Wound Medications:              Wound cleansing:                           Do not scrub or use excessive force. Wash hands with soap and water before and after dressing changes. Prior to applying a clean dressing, cleanse wound with normal saline,                                wound cleanser, or mild soap and water. Ask the physician or nurse before getting the wound(s) wet in a shower              Daily Wound management:                          Keep weight off wounds and reposition every 2 hours. Avoid standing for long periods of time. Apply wraps/stockings in AM and remove at bedtime. If swelling is present, elevate legs to the level of the heart or above for 30  minutes 4-5 times a day and/or when sitting.                                                   When taking antibiotics take entire prescription as ordered by physician do not stop taking until medicine is all gone.                                                                 Orders for this week (1/10/20):                Proximal buttock wound- Wash with mild soap and water. Gently pack with plain 1/4 in packing strips, add several drops to betadine to strip. Cover with fluffed 4x4, and secure with island border. Change daily. Buttock cluster- Wash with mild soap and water. Paint with betadine, cover with island border gauze. Change Daily. Right Lower leg- Wash with mild soap and water. Paint with betadine, Cover with calcium alginate; abd, wrap with cast padding and secure with coban and tape. Change every other day. Follow up with Dr Darin Lin. In 1 week in the wound care center  Call [phone] for any questions or concerns.   Date__________   Time____________          Treatment Note      Written Patient Dismissal Instructions Given            Electronically signed by Bertha Dickerson MD on 1/10/2020 at 11:04 AM

## 2020-01-17 ENCOUNTER — HOSPITAL ENCOUNTER (OUTPATIENT)
Dept: WOUND CARE | Age: 70
Discharge: HOME | End: 2020-01-17
Payer: MEDICARE

## 2020-01-17 VITALS
SYSTOLIC BLOOD PRESSURE: 106 MMHG | HEART RATE: 97 BPM | TEMPERATURE: 97.7 F | RESPIRATION RATE: 16 BRPM | DIASTOLIC BLOOD PRESSURE: 62 MMHG

## 2020-01-17 PROBLEM — L89.43 PRESSURE ULCER OF CONTIGUOUS REGION INVOLVING BACK AND LEFT BUTTOCK, STAGE 3 (HCC): Status: ACTIVE | Noted: 2020-01-03

## 2020-01-17 PROBLEM — I89.0 LYMPHEDEMA OF BOTH LOWER EXTREMITIES: Status: ACTIVE | Noted: 2020-01-17

## 2020-01-17 PROCEDURE — 87073 CULTURE BACTERIA ANAEROBIC: CPT

## 2020-01-17 PROCEDURE — 87186 SC STD MICRODIL/AGAR DIL: CPT

## 2020-01-17 PROCEDURE — 87071 CULTURE AEROBIC QUANT OTHER: CPT

## 2020-01-17 PROCEDURE — 11043 DBRDMT MUSC&/FSCA 1ST 20/<: CPT

## 2020-01-17 PROCEDURE — 87077 CULTURE AEROBIC IDENTIFY: CPT

## 2020-01-17 RX ORDER — LIDOCAINE HYDROCHLORIDE 40 MG/ML
SOLUTION TOPICAL ONCE
Status: DISCONTINUED | OUTPATIENT
Start: 2020-01-17 | End: 2020-01-18 | Stop reason: HOSPADM

## 2020-01-17 ASSESSMENT — PAIN DESCRIPTION - ORIENTATION: ORIENTATION: RIGHT;LEFT

## 2020-01-17 ASSESSMENT — PAIN DESCRIPTION - ONSET: ONSET: ON-GOING

## 2020-01-17 ASSESSMENT — PAIN DESCRIPTION - PROGRESSION: CLINICAL_PROGRESSION: NOT CHANGED

## 2020-01-17 ASSESSMENT — PAIN SCALES - GENERAL: PAINLEVEL_OUTOF10: 2

## 2020-01-17 ASSESSMENT — PAIN DESCRIPTION - FREQUENCY: FREQUENCY: INTERMITTENT

## 2020-01-17 ASSESSMENT — PAIN DESCRIPTION - PAIN TYPE: TYPE: ACUTE PAIN

## 2020-01-17 NOTE — PROGRESS NOTES
Wound Care Center Progress Note with Procedure Note      Agapito Bolton  AGE: 77 y.o. GENDER: female  : 1950  EPISODE DATE:  2020     Subjective:     Chief Complaint   Patient presents with    Wound Check     buttock poly legs         HISTORY of PRESENT ILLNESS      Agapito Bolton is a 77 y.o. female who presents today for wound evaluation of Chronic venous, pressure and lymphedema wound(s) of R lower leg medial, Buttocks L gluteal, Buttocks R gluteal.  The wound is of marked severity. The underlying cause of the wound is pressure.     Wound Pain Timing/Severity: intermittent  Quality of pain: sharp  Severity of pain:  3 / 10   Modifying Factors: lymphedema, chronic pressure, decreased mobility, immunosuppression and malnutrition  Associated Signs/Symptoms: drainage        PAST MEDICAL HISTORY        Diagnosis Date    Anemia     Arthritis     left hip    Cancer ([address] 2014    breast cancer( left)- tx with surgery, chemo and radiation- follows with Dr Eli Le and Dr Lizz Jo Diverticulosis     Edema     Esophagitis     GERD (gastroesophageal reflux disease)     Glaucoma     \"they say I have in the left eye but no treatment yet\"    Hip fracture ([address]     History of blood transfusion     \"in  after surgery    History of kidney stones     \"with the testing shows I have small kidney stones both kidneys but no trouble with them\"    Hypertension     Lymphedema     Metastatic breast cancer ([address]     Osteoarthritis     Tachycardia     WD-Pressure injury of contiguous region involving back and right buttock, stage 2 ([address] 1/3/2020    WD-Pressure injury of left buttock, stage 3 ([address] 1/3/2020    WD-Venous stasis ulcer of right lower leg with edema of right lower leg ([address] 1/10/2020    Wears glasses        PAST SURGICAL HISTORY    Past Surgical History:   Procedure Laterality Date    ABDOMEN SURGERY      \"in  did exploratory surgery- found I had cyst on right ovary that ruptured and said I was bleeding internally\"/ then back age 2 ( 6) did exploratory surgery for hilario colon \"    APPENDECTOMY  age 3    BONE BIOPSY N/A 03/14/2019    IR    BREAST SURGERY Left 12/2014    Masectomy    COLONOSCOPY  03/2006    Normal exam    COLONOSCOPY  10/04/2017    mild sigmoid divertics    HEMIARTHROPLASTY HIP Left 9/28/2019    HIP HEMIARTHROPLASTY performed by Keyla Otoole MD at [address] [facility] Right 11/5/2019    HIP HEMIARTHROPLASTY performed by Keyla Otoole MD at [address] [facility]      Removed 7/2016( inserted in 2015)       FAMILY HISTORY    Family History   Problem Relation Age of Onset    High Blood Pressure Mother     Arthritis Mother     Diabetes Father     Asthma Father         COPD    Heart Disease Father        SOCIAL HISTORY    Social History     Tobacco Use    Smoking status: Never Smoker    Smokeless tobacco: Never Used   Substance Use Topics    Alcohol use: Not Currently     Comment: per pt on 3/13/2019\"use to drink average one glass per week- none recently\"    Drug use: No       ALLERGIES    Allergies   Allergen Reactions    Sulfa Antibiotics Other (See Comments)     As child unsure of the reaction       MEDICATIONS    Current Outpatient Medications on File Prior to Encounter   Medication Sig Dispense Refill    morphine (MSIR) 15 MG tablet Take 15 mg by mouth 2 times daily.  HYDROcodone-acetaminophen (NORCO) 7.5-325 MG per tablet Take 1 tablet by mouth every 6 hours as needed for Pain.       nadolol (CORGARD) 80 MG tablet Take 1 tablet by mouth daily 30 tablet 3    furosemide (LASIX) 20 MG tablet Take 1 tablet by mouth daily As needed for swelling 60 tablet 3    potassium chloride (KLOR-CON M) 20 MEQ TBCR extended release tablet Take 20 mEq by mouth 2 times daily      Alpelisib, 200 MG Daily Dose, (PIQRAY, 200 MG DAILY DOSE,) 200 MG TBPK Take 150 mg by mouth daily (with breakfast)       OLANZapine (ZYPREXA) 2.5 MG tablet Take 2.5 mg by mouth nightly Takes during Chemo      folic acid (FOLVITE) 1 MG tablet Take 800 mcg by mouth daily      ondansetron (ZOFRAN) 8 MG tablet Take 8 mg by mouth every 8 hours as needed for Nausea or Vomiting      vitamin C (ASCORBIC ACID) 500 MG tablet Take 500 mg by mouth daily      omeprazole (PRILOSEC) 20 MG delayed release capsule Take 20 mg by mouth daily       Multiple Vitamin (MULTI VITAMIN DAILY PO) Take by mouth      vitamin E 400 UNIT capsule Take 400 Units by mouth daily      aspirin 81 MG tablet Take 81 mg by mouth every other day       Loratadine 10 MG CAPS Take by mouth daily       dexamethasone (DECADRON) 2 MG tablet Take 1 mg by mouth every other day Alternating with 4mg every other day       dexamethasone (DECADRON) 4 MG tablet Take 4 mg by mouth every other day Alternate 4mg and 2 mg tabs every other day      Mastectomy Bra MISC by Does not apply route 4 each 0    Breast Prosthesis MISC by Does not apply route 1 each 1    acetaminophen (TYLENOL) 325 MG tablet Take 650 mg by mouth every 6 hours as needed for Pain       No current facility-administered medications on file prior to encounter. REVIEW OF SYSTEMS      Constitutional: Negative for systemic symptoms including fever, chills and malaise. Objective:      /62   Pulse 97   Temp 97.7 °F (36.5 °C) (Temporal)   Resp 16   LMP 01/13/2005     PHYSICAL EXAM  Constitutional: Negative for systemic symptoms including fever, chills and malaise. General: The patient is in no acute distress. Mental status:  Patient is appropriate, is  oriented to place and plan of care.   Dermatologic exam: Visual inspection of the periwound reveals the skin to be normal in turgor and texture  Wound exam: see wound description below in procedure note      Assessment:     Problem List Items Addressed This Visit     WD-Pressure injury of left buttock, stage 3 (HCC) (cm^3) 1.58 cm^3 1/10/2020 10:56 AM   Distance Tunneling (cm) 0 cm 1/17/2020  8:36 AM   Tunneling Position ___ O'Clock 0 1/17/2020  8:36 AM   Undermining Starts ___ O'Clock 0 1/17/2020  8:36 AM   Undermining Ends___ O'Clock 00 1/17/2020  8:36 AM   Undermining Maxium Distance (cm) 0 1/17/2020  8:36 AM   Wound Assessment Red 1/17/2020  8:36 AM   Drainage Amount Moderate 1/17/2020  8:36 AM   Drainage Description Sanguinous 1/17/2020  8:36 AM   Odor None 1/17/2020  8:36 AM   Margins Defined edges 1/17/2020  8:36 AM   Jackelin-wound Assessment Red 1/17/2020  8:36 AM   Non-staged Wound Description Full thickness 1/17/2020  8:36 AM   [place]%Wound Bed 0 1/17/2020  8:36 AM   Red%Wound Bed 20 1/17/2020  8:36 AM   Yellow%Wound Bed 80 1/17/2020  8:36 AM   Black%Wound Bed 0 1/17/2020  8:36 AM   Purple%Wound Bed 0 1/17/2020  8:36 AM   Other%Wound Bed 0 1/17/2020  8:36 AM   Number of days: 14       Wound 01/03/20 #2 Left Buttock Cluster (Active)   Wound Image   1/3/2020  8:34 AM   Wound Pressure Stage  2 1/17/2020  8:36 AM   Dressing Status Clean;Dry; Intact 1/10/2020 11:33 AM   Dressing Changed Changed/New 1/10/2020 11:33 AM   Wound Cleansed Rinsed/Irrigated with saline 1/17/2020  8:36 AM   Wound Length (cm) 1 cm 1/17/2020  8:36 AM   Wound Width (cm) 0.9 cm 1/17/2020  8:36 AM   Wound Depth (cm) 0.1 cm 1/17/2020  8:36 AM   Wound Surface Area (cm^2) 0.9 cm^2 1/17/2020  8:36 AM   Change in Wound Size % (l*w) 85 1/17/2020  8:36 AM   Wound Volume (cm^3) 0.09 cm^3 1/17/2020  8:36 AM   Wound Healing % 85 1/17/2020  8:36 AM   Distance Tunneling (cm) 0 cm 1/17/2020  8:36 AM   Tunneling Position ___ O'Clock 0 1/17/2020  8:36 AM   Undermining Starts ___ O'Clock 0 1/17/2020  8:36 AM   Undermining Ends___ O'Clock 0 1/17/2020  8:36 AM   Undermining Maxium Distance (cm) 0 1/17/2020  8:36 AM   Wound Assessment Red;Yellow 1/17/2020  8:36 AM   Drainage Amount Moderate 1/17/2020  8:36 AM   Drainage Description Serosanguinous 1/17/2020  8:36 AM   Odor None treatment:  Well tolerated by patient. Wound is is unchanged    Plan:       Discharge Instructions             PHYSICIAN ORDERS AND DISCHARGE INSTRUCTIONS     NOTE: Upon discharge from the [address], you will receive a patient experience survey. We would be grateful if you would take the time to fill this survey out.     Wound care order history:                 TOÑO's   N/A--buttock wound              Vascular studies: done in 11/19               Imaging:               Cultures:               Labs/ HbA1c: 5.8 in 11/19              Grafts:               HBO: To be determined               Antibiotics:               Earlier Wound care treatments:              Authorizations:               Consults:                           Primary care physician: Austin Esperanza      Continuing wound care orders and information:              Residence:  Private              Continue home health care with: [facility]               [facility] wound-care supplies will be provided by: [facility]               DME provider:              Compression with N/A              QKC loading:  N/A              BYVHR Medications:              UBXBJ cleansing:                           UM not scrub or use excessive force.                          Wash hands with soap and water before and after dressing changes.                           Prior to applying a clean dressing, cleanse wound with normal saline,                                wound cleanser, or mild soap and water.                           Ask the physician or nurse before getting the wound(s) wet in a shower              Daily Wound management:                          Keep weight off wounds and reposition every 2 hours.                          Avoid standing for long periods of time.                          Apply wraps/stockings in AM and remove at bedtime.                          If swelling is present, elevate legs to the level of the heart or above for 30  minutes 4-5 times a day and/or when

## 2020-01-20 LAB
CULTURE: ABNORMAL
Lab: ABNORMAL
SPECIMEN: ABNORMAL

## 2020-01-24 ENCOUNTER — HOSPITAL ENCOUNTER (OUTPATIENT)
Dept: WOUND CARE | Age: 70
Discharge: HOME | End: 2020-01-24
Payer: MEDICARE

## 2020-01-24 VITALS — HEART RATE: 89 BPM | DIASTOLIC BLOOD PRESSURE: 63 MMHG | SYSTOLIC BLOOD PRESSURE: 109 MMHG | RESPIRATION RATE: 18 BRPM

## 2020-01-24 PROCEDURE — 11043 DBRDMT MUSC&/FSCA 1ST 20/<: CPT

## 2020-01-24 RX ORDER — CIPROFLOXACIN 500 MG/1
500 TABLET, FILM COATED ORAL 2 TIMES DAILY
Qty: 14 TABLET | Refills: 0 | Status: SHIPPED | OUTPATIENT
Start: 2020-01-24 | End: 2020-01-31

## 2020-01-24 ASSESSMENT — PAIN SCALES - GENERAL: PAINLEVEL_OUTOF10: 0

## 2020-01-24 NOTE — PROGRESS NOTES
Wound Care Center Progress Note with Procedure Note      Noemi Strauss  AGE: 77 y.o. GENDER: female  : 1950  EPISODE DATE:  2020     Subjective:     Chief Complaint   Patient presents with    Wound Check     buttocks and right leg          HISTORY of PRESENT ILLNESS      Noemi Strauss is a 77 y.o. female who presents today for wound evaluation of Chronic pressure wound(s) of R lower leg medial, Buttocks L gluteal, Buttocks R gluteal.  The wound is of marked severity. The underlying cause of the wound is pressure.     Wound Pain Timing/Severity: waxing and waning  Quality of pain: sharp  Severity of pain:  4 / 10   Modifying Factors: chronic pressure, decreased mobility, immunosuppression and malnutrition  Associated Signs/Symptoms: none        PAST MEDICAL HISTORY        Diagnosis Date    Anemia     Arthritis     left hip    Cancer ([address] 2014    breast cancer( left)- tx with surgery, chemo and radiation- follows with Dr Theodore May and Dr Aleksandr Ellis Diverticulosis     Edema     Esophagitis     GERD (gastroesophageal reflux disease)     Glaucoma     \"they say I have in the left eye but no treatment yet\"    Hip fracture ([address]     History of blood transfusion     \"in  after surgery    History of kidney stones     \"with the testing shows I have small kidney stones both kidneys but no trouble with them\"    Hypertension     Lymphedema     Metastatic breast cancer ([address]     Osteoarthritis     Tachycardia     WD-Lymphedema of both lower extremities 2020    WD-Pressure injury of contiguous region involving back and right buttock, stage 2 ([address] 1/3/2020    WD-Pressure injury of left buttock, stage 3 ([address] 1/3/2020    WD-Venous stasis ulcer of right lower leg with edema of right lower leg ([address] 1/10/2020    Wears glasses        PAST SURGICAL HISTORY    Past Surgical History:   Procedure Laterality Date    ABDOMEN SURGERY      \"in WD-Pressure injury of contiguous region involving back and right buttock, stage 2 ([address]    WD-Pressure ulcer of contiguous region involving back and left buttock, stage 3 ([facility])    WD-Venous stasis ulcer of right lower leg with edema of right lower leg ([address]        Procedure Note    Indications:  Based on my examination of this patient's wound(s) today, sharp excision into necrotic muscle/fascia is required to promote healing and evaluate the extent of previous healing. Performed by: Beatriz Chin MD    Consent obtained: Yes    Time out taken:  Yes    Pain Control:  Anesthetic  Anesthetic: 4% Lidocaine Liquid Topical     Debridement:Excisional Debridement    Using curette and rongeur the wound(s) was/were sharply debrided down through and including the removal of muscle/fascia. Devitalized Tissue Debrided:  biofilm, slough and necrotic/eschar    Pre Debridement Measurements:  Are located in the Wound Documentation Flow Sheet    All active wounds listed below with today's date are evaluated  Wound(s)    debrided this date include # : 1     Post  Debridement Measurements:  Wound 11/04/19 Buttocks Left;Upper (Active)   Number of days: 81       Wound 01/03/20 #1 Left proximal Buttock (Active)   Wound Image   1/24/2020  9:27 AM   Wound Pressure Stage  2 1/24/2020  9:27 AM   Dressing Status Clean;Dry; Intact 1/17/2020 10:57 AM   Dressing Changed Changed/New 1/17/2020 10:57 AM   Wound Cleansed Rinsed/Irrigated with saline 1/24/2020  9:27 AM   Wound Length (cm) 1.7 cm 1/24/2020  9:27 AM   Wound Width (cm) 1 cm 1/24/2020  9:27 AM   Wound Depth (cm) 2.3 cm 1/24/2020  9:27 AM   Wound Surface Area (cm^2) 1.7 cm^2 1/24/2020  9:27 AM   Change in Wound Size % (l*w) -102.38 1/24/2020  9:27 AM   Wound Volume (cm^3) 3.91 cm^3 1/24/2020  9:27 AM   Wound Healing % -144 1/24/2020  9:27 AM   Post-Procedure Length (cm) 1.5 cm 1/10/2020 10:56 AM   Post-Procedure Width (cm) 0.7 cm 1/10/2020 10:56 AM   Post-Procedure Depth (cm) 1.5 cm 1/10/2020 10:56 AM   Post-Procedure Surface Area (cm^2) 1.05 cm^2 1/10/2020 10:56 AM   Post-Procedure Volume (cm^3) 1.58 cm^3 1/10/2020 10:56 AM   Distance Tunneling (cm) 0 cm 1/24/2020  9:27 AM   Tunneling Position ___ O'Clock 0 1/24/2020  9:27 AM   Undermining Starts ___ O'Clock 0 1/24/2020  9:27 AM   Undermining Ends___ O'Clock 0 1/24/2020  9:27 AM   Undermining Maxium Distance (cm) 0 1/24/2020  9:27 AM   Wound Assessment Red 1/24/2020  9:27 AM   Drainage Amount Moderate 1/24/2020  9:27 AM   Drainage Description Sanguinous 1/24/2020  9:27 AM   Odor Mild 1/24/2020  9:27 AM   Margins Defined edges 1/24/2020  9:27 AM   [place]-wound Assessment Pink 1/24/2020  9:27 AM   Non-staged Wound Description Full thickness 1/24/2020  9:27 AM   [place]%Wound Bed 0 1/24/2020  9:27 AM   Red%Wound Bed 20 1/24/2020  9:27 AM   Yellow%Wound Bed 80 1/24/2020  9:27 AM   Black%Wound Bed 0 1/24/2020  9:27 AM   Purple%Wound Bed 0 1/24/2020  9:27 AM   Other%Wound Bed 0 1/24/2020  9:27 AM   Number of days: 21       Wound 01/03/20 #2 Left Buttock Cluster (Active)   Wound Image   1/24/2020  9:27 AM   Wound Pressure Stage  2 1/24/2020  9:27 AM   Dressing Status Clean;Dry; Intact 1/17/2020 10:57 AM   Dressing Changed Changed/New 1/17/2020 10:57 AM   Wound Cleansed Rinsed/Irrigated with saline 1/24/2020  9:27 AM   Wound Length (cm) 0.7 cm 1/24/2020  9:27 AM   Wound Width (cm) 0.3 cm 1/24/2020  9:27 AM   Wound Depth (cm) 0.1 cm 1/24/2020  9:27 AM   Wound Surface Area (cm^2) 0.21 cm^2 1/24/2020  9:27 AM   Change in Wound Size % (l*w) 96.5 1/24/2020  9:27 AM   Wound Volume (cm^3) 0.02 cm^3 1/24/2020  9:27 AM   Wound Healing % 97 1/24/2020  9:27 AM   Distance Tunneling (cm) 0 cm 1/24/2020  9:27 AM   Tunneling Position ___ O'Clock 0 1/24/2020  9:27 AM   Undermining Starts ___ O'Clock 0 1/24/2020  9:27 AM   Undermining Ends___ O'Clock 0 1/24/2020  9:27 AM   Undermining Maxium Distance (cm) 0 1/24/2020  9:27 AM   Wound Assessment Red;Yellow 1/24/2020  9:27 Purple%Wound Bed 0 1/24/2020  9:27 AM   Other%Wound Bed 0 1/24/2020  9:27 AM   Number of days: 21       Wound 01/10/20 #4 Right Leg (Active)   Wound Image   1/10/2020  9:52 AM   Wound Venous 1/24/2020  9:27 AM   Dressing Status Clean;Dry; Intact 1/17/2020  9:30 AM   Dressing Changed Changed/New 1/17/2020  9:30 AM   Wound Cleansed Rinsed/Irrigated with saline 1/24/2020  9:27 AM   Wound Length (cm) 0.7 cm 1/24/2020  9:27 AM   Wound Width (cm) 0.5 cm 1/24/2020  9:27 AM   Wound Depth (cm) 0.1 cm 1/24/2020  9:27 AM   Wound Surface Area (cm^2) 0.35 cm^2 1/24/2020  9:27 AM   Change in Wound Size % (l*w) 99.86 1/24/2020  9:27 AM   Wound Volume (cm^3) 0.04 cm^3 1/24/2020  9:27 AM   Wound Healing % 100 1/24/2020  9:27 AM   Post-Procedure Length (cm) 16.5 cm 1/10/2020 10:39 AM   Post-Procedure Width (cm) 15 cm 1/10/2020 10:39 AM   Post-Procedure Depth (cm) 0.1 cm 1/10/2020 10:39 AM   Post-Procedure Surface Area (cm^2) 247.5 cm^2 1/10/2020 10:39 AM   Post-Procedure Volume (cm^3) 24.75 cm^3 1/10/2020 10:39 AM   Distance Tunneling (cm) 0 cm 1/24/2020  9:27 AM   Tunneling Position ___ O'Clock 0 1/24/2020  9:27 AM   Undermining Starts ___ O'Clock 0 1/24/2020  9:27 AM   Undermining Ends___ O'Clock 0 1/24/2020  9:27 AM   Undermining Maxium Distance (cm) 0 1/24/2020  9:27 AM   Wound Assessment Red 1/24/2020  9:27 AM   Drainage Amount None 1/24/2020  9:27 AM   Drainage Description Yellow 1/17/2020  8:36 AM   Odor None 1/24/2020  9:27 AM   Margins Undefined edges 1/24/2020  9:27 AM   [place]-wound Assessment Pink 1/24/2020  9:27 AM   Non-staged Wound Description Not applicable 0/06/0236  6:98 AM   [place]%Wound Bed 0 1/24/2020  9:27 AM   Red%Wound Bed 100 1/24/2020  9:27 AM   Yellow%Wound Bed 0 1/24/2020  9:27 AM   Black%Wound Bed 0 1/24/2020  9:27 AM   Purple%Wound Bed 0 1/24/2020  9:27 AM   Other%Wound Bed 0 1/24/2020  9:27 AM   Number of days: 14       Percent of Wound(s) Debrided: approximately 100%    Total Surface Area Debrided:  1.05 sq cm     Bleeding:  Minimal    Hemostasis Achieved:  by pressure    Procedural Pain:  4  / 10     Post Procedural Pain:  0 / 10     Response to treatment:  Poorly tolerated by patient. Wound is is unchanged    Plan:       Discharge Instructions       PHYSICIAN ORDERS AND DISCHARGE INSTRUCTIONS     NOTE: Upon discharge from the [address], you will receive a patient experience survey. We would be grateful if you would take the time to fill this survey out.     Wound care order history:                 TOÑO's   N/A--buttock wound              Vascular studies: done in 11/19               Imaging:               Cultures:               Labs/ HbA1c: 5.8 in 11/19              Grafts:               HBO: To be determined               Antibiotics:               Earlier Wound care treatments:              Authorizations:               Consults:                           Primary care physician: Andrzej Maddox      Continuing wound care orders and information:              Residence:  Private              Continue home health care with: [facility]               [facility] wound-care supplies will be provided by: [facility]               DME provider:              Compression with N/A              TYR loading:  N/A              WOTOA Medications:              FKJAC cleansing:                           VI not scrub or use excessive force.                          Wash hands with soap and water before and after dressing changes.                           Prior to applying a clean dressing, cleanse wound with normal saline,                                wound cleanser, or mild soap and water.                           Ask the physician or nurse before getting the wound(s) wet in a shower              Daily Wound management:                          Keep weight off wounds and reposition every 2 hours.                          LRXWX standing for long periods of time.                          JNUAL wraps/stockings in AM and remove at bedtime.                          If swelling is present, elevate legs to the level of the heart or above for 30  minutes 4-5 times a day and/or when sitting.                                                  When taking antibiotics take entire prescription as ordered by physician do not stop taking until medicine is all gone.                                                                 Orders for this week (1/24/20):                Proximal buttock wound- Wash with mild soap and water. Gently pack with plain 1/4 in packing strips, add several drops to betadine to strip. Cover with fluffed 4x4, and secure with island border. Change daily.                  Buttock cluster- Wash with mild soap and water. Paint with betadine, cover with island border gauze. Change Daily.                    Bilateral Lower leg- Wash with mild soap and water. Lotrisone to entire leg. Wrap with Unna boot and secure with conform and tape. Leave in place x1 week.       Follow up with Dr Denise Soares In 1 week in the wound care center  Call [phone] for any questions or concerns.   Date__________   Time____________        Treatment Note      Written Patient Dismissal Instructions Given            Electronically signed by Neetu Delarosa MD on 1/24/2020 at 10:29 AM

## 2020-01-27 ENCOUNTER — HOSPITAL ENCOUNTER (OUTPATIENT)
Dept: INFUSION THERAPY | Age: 70
Discharge: HOME | End: 2020-01-27
Payer: MEDICARE

## 2020-01-27 LAB
ALBUMIN SERPL-MCNC: 2.6 GM/DL (ref 3.4–5)
ALP BLD-CCNC: 300 IU/L (ref 40–128)
ALT SERPL-CCNC: 35 U/L (ref 10–40)
ANION GAP SERPL CALCULATED.3IONS-SCNC: 13 MMOL/L (ref 4–16)
AST SERPL-CCNC: 41 IU/L (ref 15–37)
BILIRUB SERPL-MCNC: 0.7 MG/DL (ref 0–1)
BUN BLDV-MCNC: 12 MG/DL (ref 6–23)
CALCIUM SERPL-MCNC: 7.4 MG/DL (ref 8.3–10.6)
CHLORIDE BLD-SCNC: 99 MMOL/L (ref 99–110)
CO2: 27 MMOL/L (ref 21–32)
CREAT SERPL-MCNC: 0.7 MG/DL (ref 0.6–1.1)
DIFFERENTIAL TYPE: ABNORMAL
EOSINOPHILS ABSOLUTE: 0.2 K/CU MM
EOSINOPHILS RELATIVE PERCENT: 2 % (ref 0–3)
GFR AFRICAN AMERICAN: >60 ML/MIN/1.73M2
GFR NON-AFRICAN AMERICAN: >60 ML/MIN/1.73M2
GLUCOSE BLD-MCNC: 223 MG/DL (ref 70–99)
HCT VFR BLD CALC: 32.2 % (ref 37–47)
HEMOGLOBIN: 10.7 GM/DL (ref 12.5–16)
LYMPHOCYTES ABSOLUTE: 1.7 K/CU MM
LYMPHOCYTES RELATIVE PERCENT: 23 % (ref 24–44)
MCH RBC QN AUTO: 34.9 PG (ref 27–31)
MCHC RBC AUTO-ENTMCNC: 33.2 % (ref 32–36)
MCV RBC AUTO: 104.9 FL (ref 78–100)
MONOCYTES ABSOLUTE: 1.8 K/CU MM
MONOCYTES RELATIVE PERCENT: 24 % (ref 0–4)
PDW BLD-RTO: 20.3 % (ref 11.7–14.9)
PLATELET # BLD: 128 K/CU MM (ref 140–440)
PMV BLD AUTO: 10.1 FL (ref 7.5–11.1)
POTASSIUM SERPL-SCNC: 3.3 MMOL/L (ref 3.5–5.1)
RBC # BLD: 3.07 M/CU MM (ref 4.2–5.4)
SEGMENTED NEUTROPHILS ABSOLUTE COUNT: 3.8 K/CU MM
SEGMENTED NEUTROPHILS RELATIVE PERCENT: 51 % (ref 36–66)
SODIUM BLD-SCNC: 139 MMOL/L (ref 135–145)
TOTAL PROTEIN: 4.6 GM/DL (ref 6.4–8.2)
WBC # BLD: ABNORMAL K/CU MM (ref 4–10.5)

## 2020-01-27 PROCEDURE — 86300 IMMUNOASSAY TUMOR CA 15-3: CPT

## 2020-01-27 PROCEDURE — 36415 COLL VENOUS BLD VENIPUNCTURE: CPT

## 2020-01-27 PROCEDURE — 36591 DRAW BLOOD OFF VENOUS DEVICE: CPT

## 2020-01-27 PROCEDURE — 80053 COMPREHEN METABOLIC PANEL: CPT

## 2020-01-27 PROCEDURE — 99214 OFFICE O/P EST MOD 30 MIN: CPT

## 2020-01-27 PROCEDURE — 96402 CHEMO HORMON ANTINEOPL SQ/IM: CPT

## 2020-01-27 PROCEDURE — 6360000002 HC RX W HCPCS: Performed by: INTERNAL MEDICINE

## 2020-01-27 PROCEDURE — 85025 COMPLETE CBC W/AUTO DIFF WBC: CPT

## 2020-01-27 RX ORDER — LAMOTRIGINE 25 MG/1
500 TABLET ORAL ONCE
Status: DISCONTINUED | OUTPATIENT
Start: 2020-01-27 | End: 2020-01-28 | Stop reason: HOSPADM

## 2020-01-28 LAB
CA 27.29: 111.4 U/ML (ref 0–40)
CA 27.29: ABNORMAL U/ML (ref 0–40)

## 2020-01-31 ENCOUNTER — HOSPITAL ENCOUNTER (OUTPATIENT)
Dept: WOUND CARE | Age: 70
Discharge: HOME | End: 2020-01-31
Payer: MEDICARE

## 2020-01-31 VITALS
TEMPERATURE: 97.8 F | HEART RATE: 82 BPM | DIASTOLIC BLOOD PRESSURE: 65 MMHG | SYSTOLIC BLOOD PRESSURE: 105 MMHG | RESPIRATION RATE: 17 BRPM

## 2020-01-31 PROBLEM — Z96.643 STATUS POST BILATERAL TOTAL HIP REPLACEMENT: Status: ACTIVE | Noted: 2020-01-31

## 2020-01-31 PROCEDURE — 11042 DBRDMT SUBQ TIS 1ST 20SQCM/<: CPT

## 2020-01-31 PROCEDURE — 87073 CULTURE BACTERIA ANAEROBIC: CPT

## 2020-01-31 PROCEDURE — 87071 CULTURE AEROBIC QUANT OTHER: CPT

## 2020-01-31 NOTE — PROGRESS NOTES
Wound Care Center Progress Note With Procedure    Win Resendiz  AGE: 77 y.o. GENDER: female  : 1950  EPISODE DATE:  2020     Subjective:     Chief Complaint   Patient presents with    Wound Check     left & right buttock& ble          HISTORY of PRESENT ILLNESS      Win Resendiz is a 77 y.o. female who presents today for wound evaluation of Chronic venous and lymphedema ulcer(s) of the bilateral legs and a stage 3 pressure ulcer of the sacral region. The ulcer is of moderate severity. The underlying cause of the wound is lymphedema on the legs and pressure on the buttocks. I review her chart today- she is on my clinic day due to other conflicting appointments. She unfortunately has a history of breast cancer and has had bilateral hip pathologic fractures. She has suffered from lymphedema for a year now- starting with the mastectomy, then she has had lymphedema after her bilateral hip fracture replacements starting in 2019. She now over the past few months has had the complications of open wounds of the legs due to the lymphedema. We have been applying compression to her legs here at the wound center, she elevates her legs at home. She is unable to really do any significant exercising due to her cancer and generalized weakness. She wears bilateral arm compression sleeves as well. Unfortunately, the lymphedema is all over her body and has not really gotten any better. At this point, at least for her legs, she would benefit from lymphedema pumps. With regards to her pressure ulcer of the sacral region- this has been stable with the wound vac. With regards to her leg ulcers, the right leg is a little better with the unnaboot, the left leg is with light compression with coban- this is getting worse and now she has leaking area on the left leg as well.       AND, in addition to all this above, she has a new ulcer on her right great toe with associated redness and Mastectomy Bra MISC by Does not apply route 4 each 0    Breast Prosthesis MISC by Does not apply route 1 each 1    ondansetron (ZOFRAN) 8 MG tablet Take 8 mg by mouth every 8 hours as needed for Nausea or Vomiting      vitamin C (ASCORBIC ACID) 500 MG tablet Take 500 mg by mouth daily      acetaminophen (TYLENOL) 325 MG tablet Take 650 mg by mouth every 6 hours as needed for Pain      omeprazole (PRILOSEC) 20 MG delayed release capsule Take 20 mg by mouth daily       Multiple Vitamin (MULTI VITAMIN DAILY PO) Take by mouth      vitamin E 400 UNIT capsule Take 400 Units by mouth daily      aspirin 81 MG tablet Take 81 mg by mouth every other day       Loratadine 10 MG CAPS Take by mouth daily       dexamethasone (DECADRON) 4 MG tablet Take 4 mg by mouth every other day Alternate 4mg and 2 mg tabs every other day       No current facility-administered medications on file prior to encounter. REVIEW OF SYSTEMS    Pertinent items are noted in HPI. Constitutional: Negative for systemic symptoms including fever, chills and malaise. Objective:      /65   Pulse 82   Temp 97.8 °F (36.6 °C) (Temporal)   Resp 17   LMP 01/13/2005     PHYSICAL EXAM      General: The patient is in no acute distress. Mental status:  Patient is appropriate, is  oriented to place and plan of care. Dermatologic exam: Visual inspection of the periwound reveals the skin to be sclerotic, atrophic, scaly and edematous, her legs have bilateral 3 plus pitting edema. There is thickened skin. She is a stage 2 lymphedema on her legs.      Wound exam: see wound description below in procedure note      Assessment:     Problem List Items Addressed This Visit     S/P left mastectomy    Pathological fracture of hip, initial encounter [facility])    WD-Pressure injury of left buttock, stage 3 ([facility])    WD-Pressure injury of contiguous region involving back and right buttock, stage 2 ([address]    WD-Pressure ulcer of contiguous region involving back and left buttock, stage 3 ([facility]) - Primary    WD-Venous stasis ulcer of right lower leg with edema of right lower leg ([facility])    WD-Lymphedema of both lower extremities        Procedure Note    Indications:  Based on my examination of this patient's wound(s) today, sharp excision into necrotic subcutaneous tissue is required to promote healing and evaluate the extent of previous healing. Performed by: Aniya Ryan MD    Consent obtained: Yes    Time out taken:  Yes    Pain Control: Anesthetic  Anesthetic: 4% Lidocaine Liquid Topical     Debridement :Excisional Debridement    Using curette, scissors and forceps the wound(s) was/were sharply debrided down through and including the removal of epidermis, dermis and subcutaneous tissue. Devitalized Tissue Debrided:  fibrin, biofilm and slough    Pre Debridement Measurements:  Are located in the Wound Documentation Flow Sheet    All active wounds listed below with today's date are evaluated  Wound(s)    debrided this date include # : 1     Post  Debridement Measurements:  Negative Pressure Wound Therapy (Active)   Unit Type KCI  1/31/2020  4:45 PM   Dressing Type Black foam 1/31/2020  4:45 PM   Number of pieces used 1 1/31/2020  4:45 PM   Cycle Continuous 1/31/2020  4:45 PM   Target Pressure (mmHg) 125 1/31/2020  4:45 PM   Intensity 5 1/31/2020  4:45 PM   Canister changed? Yes 1/31/2020  4:45 PM   Dressing Status Clean;Dry; Intact 1/31/2020  4:45 PM   Dressing Changed Changed/New 1/31/2020  4:45 PM   Drainage Amount Moderate 1/31/2020  4:45 PM   Drainage Description Serosanguinous 1/31/2020  4:45 PM   Wound Assessment Pink 1/31/2020  4:45 PM   Jackelin-wound Assessment Clean 1/31/2020  4:45 PM   Odor Mild 1/31/2020  4:45 PM   Number of days: 7       Wound 11/04/19 Buttocks Left;Upper (Active)   Number of days: 88       Wound 01/03/20 #1 Left proximal Buttock (Active)   Wound Image   1/24/2020  9:27 AM   Wound Pressure Stage  2 1/31/2020  2:44 PM Dressing Status Clean;Dry; Intact 1/31/2020  4:45 PM   Dressing Changed Changed/New 1/31/2020  4:45 PM   Wound Cleansed Rinsed/Irrigated with saline 1/31/2020  2:44 PM   Wound Length (cm) 2 cm 1/31/2020  2:44 PM   Wound Width (cm) 1 cm 1/31/2020  2:44 PM   Wound Depth (cm) 2.3 cm 1/31/2020  2:44 PM   Wound Surface Area (cm^2) 2 cm^2 1/31/2020  2:44 PM   Change in Wound Size % (l*w) -138.1 1/31/2020  2:44 PM   Wound Volume (cm^3) 4.6 cm^3 1/31/2020  2:44 PM   Wound Healing % -188 1/31/2020  2:44 PM   Post-Procedure Length (cm) 1.7 cm 1/24/2020 10:34 AM   Post-Procedure Width (cm) 1 cm 1/24/2020 10:34 AM   Post-Procedure Depth (cm) 2.3 cm 1/24/2020 10:34 AM   Post-Procedure Surface Area (cm^2) 1.7 cm^2 1/24/2020 10:34 AM   Post-Procedure Volume (cm^3) 3.91 cm^3 1/24/2020 10:34 AM   Distance Tunneling (cm) 0 cm 1/31/2020  2:44 PM   Tunneling Position ___ O'Clock 0 1/31/2020  2:44 PM   Undermining Starts ___ O'Clock 0 1/31/2020  2:44 PM   Undermining Ends___ O'Clock 0 1/31/2020  2:44 PM   Undermining Maxium Distance (cm) 0 1/31/2020  2:44 PM   Wound Assessment Pink;Yellow 1/31/2020  2:44 PM   Drainage Amount Moderate 1/31/2020  2:44 PM   Drainage Description Serosanguinous 1/31/2020  2:44 PM   Odor Mild 1/31/2020  2:44 PM   Margins Defined edges 1/31/2020  2:44 PM   Jackelin-wound Assessment Pink 1/31/2020  2:44 PM   Non-staged Wound Description Full thickness 1/31/2020  2:44 PM   [place]%Wound Bed 10 1/31/2020  2:44 PM   Red%Wound Bed 0 1/31/2020  2:44 PM   Yellow%Wound Bed 90 1/31/2020  2:44 PM   Black%Wound Bed 0 1/31/2020  2:44 PM   Purple%Wound Bed 0 1/31/2020  2:44 PM   Other%Wound Bed 0 1/31/2020  2:44 PM   Number of days: 28       Wound 01/03/20 #2 Left Buttock Cluster (Active)   Wound Image   1/24/2020  9:27 AM   Wound Pressure Stage  2 1/31/2020  2:44 PM   Dressing Status Clean;Dry; Intact 1/31/2020  4:45 PM   Dressing Changed Changed/New 1/31/2020  4:45 PM   Wound Cleansed Rinsed/Irrigated with saline 1/31/2020 2:44 PM   Wound Length (cm) 0.2 cm 1/31/2020  2:44 PM   Wound Width (cm) 0.2 cm 1/31/2020  2:44 PM   Wound Depth (cm) 0.1 cm 1/31/2020  2:44 PM   Wound Surface Area (cm^2) 0.04 cm^2 1/31/2020  2:44 PM   Change in Wound Size % (l*w) 99.33 1/31/2020  2:44 PM   Wound Volume (cm^3) 0 cm^3 1/31/2020  2:44 PM   Wound Healing % 100 1/31/2020  2:44 PM   Distance Tunneling (cm) 0 cm 1/31/2020  2:44 PM   Tunneling Position ___ O'Clock 0 1/31/2020  2:44 PM   Undermining Starts ___ O'Clock 0 1/31/2020  2:44 PM   Undermining Ends___ O'Clock 0 1/31/2020  2:44 PM   Undermining Maxium Distance (cm) 0 1/31/2020  2:44 PM   Wound Assessment Red 1/31/2020  2:44 PM   Drainage Amount Moderate 1/31/2020  2:44 PM   Drainage Description Serosanguinous 1/31/2020  2:44 PM   Odor None 1/31/2020  2:44 PM   Margins Defined edges 1/31/2020  2:44 PM   Jackelin-wound Assessment Pink 1/31/2020  2:44 PM   Non-staged Wound Description Full thickness 1/31/2020  2:44 PM   [place]%Wound Bed 0 1/31/2020  2:44 PM   Red%Wound Bed 100 1/31/2020  2:44 PM   Yellow%Wound Bed 0 1/31/2020  2:44 PM   Black%Wound Bed 0 1/31/2020  2:44 PM   Purple%Wound Bed 0 1/31/2020  2:44 PM   Other%Wound Bed 0 1/31/2020  2:44 PM   Number of days: 28       Wound 01/03/20 #3 Right Buttock (Active)   Wound Image   1/24/2020  9:27 AM   Wound Pressure Stage  2 1/31/2020  2:44 PM   Dressing Status Clean;Dry; Intact 1/31/2020  4:45 PM   Dressing Changed Changed/New 1/31/2020  4:45 PM   Wound Cleansed Rinsed/Irrigated with saline 1/31/2020  2:44 PM   Wound Length (cm) 0.9 cm 1/31/2020  2:44 PM   Wound Width (cm) 1.1 cm 1/31/2020  2:44 PM   Wound Depth (cm) 0.1 cm 1/31/2020  2:44 PM   Wound Surface Area (cm^2) 0.99 cm^2 1/31/2020  2:44 PM   Change in Wound Size % (l*w) 88 1/31/2020  2:44 PM   Wound Volume (cm^3) 0.1 cm^3 1/31/2020  2:44 PM   Wound Healing % 88 1/31/2020  2:44 PM   Distance Tunneling (cm) 0 cm 1/31/2020  2:44 PM   Tunneling Position ___ O'Clock 0 1/31/2020  2:44 PM   Undermining Starts ___ O'Clock 0 1/31/2020  2:44 PM   Undermining Ends___ O'Clock 0 1/31/2020  2:44 PM   Undermining Maxium Distance (cm) 0 1/31/2020  2:44 PM   Wound Assessment Pink;Yellow 1/31/2020  2:44 PM   Drainage Amount Moderate 1/31/2020  2:44 PM   Drainage Description Serosanguinous 1/31/2020  2:44 PM   Odor None 1/31/2020  2:44 PM   Margins Defined edges 1/31/2020  2:44 PM   Jackelin-wound Assessment Pink 1/31/2020  2:44 PM   Non-staged Wound Description Full thickness 1/31/2020  2:44 PM   [place]%Wound Bed 50 1/31/2020  2:44 PM   Red%Wound Bed 0 1/31/2020  2:44 PM   Yellow%Wound Bed 50 1/31/2020  2:44 PM   Black%Wound Bed 0 1/31/2020  2:44 PM   Purple%Wound Bed 0 1/31/2020  2:44 PM   Other%Wound Bed 0 1/31/2020  2:44 PM   Number of days: 28       Wound 01/10/20 #4 Right Leg (Active)   Wound Image   1/10/2020  9:52 AM   Wound Venous 1/31/2020  2:44 PM   Dressing Status Clean;Dry; Intact 1/31/2020  4:15 PM   Dressing Changed Changed/New 1/31/2020  4:15 PM   Wound Cleansed Rinsed/Irrigated with saline 1/31/2020  2:44 PM   Wound Length (cm) 0.7 cm 1/31/2020  2:44 PM   Wound Width (cm) 0.7 cm 1/31/2020  2:44 PM   Wound Depth (cm) 0.1 cm 1/31/2020  2:44 PM   Wound Surface Area (cm^2) 0.49 cm^2 1/31/2020  2:44 PM   Change in Wound Size % (l*w) 99.8 1/31/2020  2:44 PM   Wound Volume (cm^3) 0.05 cm^3 1/31/2020  2:44 PM   Wound Healing % 100 1/31/2020  2:44 PM   Post-Procedure Length (cm) 16.5 cm 1/10/2020 10:39 AM   Post-Procedure Width (cm) 15 cm 1/10/2020 10:39 AM   Post-Procedure Depth (cm) 0.1 cm 1/10/2020 10:39 AM   Post-Procedure Surface Area (cm^2) 247.5 cm^2 1/10/2020 10:39 AM   Post-Procedure Volume (cm^3) 24.75 cm^3 1/10/2020 10:39 AM   Distance Tunneling (cm) 0 cm 1/31/2020  2:44 PM   Tunneling Position ___ O'Clock 0 1/31/2020  2:44 PM   Undermining Starts ___ O'Clock 0 1/31/2020  2:44 PM   Undermining Ends___ O'Clock 0 1/31/2020  2:44 PM   Undermining Maxium Distance (cm) 0 1/31/2020  2:44 PM   Wound Assessment Sujey Carver Cleansed Soap and water 1/31/2020  2:44 PM   Wound Length (cm) 0.3 cm 1/31/2020  2:44 PM   Wound Width (cm) 1.2 cm 1/31/2020  2:44 PM   Wound Depth (cm) 0.1 cm 1/31/2020  2:44 PM   Wound Surface Area (cm^2) 0.36 cm^2 1/31/2020  2:44 PM   Wound Volume (cm^3) 0.04 cm^3 1/31/2020  2:44 PM   Tunneling Position ___ O'Clock 0 1/31/2020  2:44 PM   Undermining Starts ___ O'Clock 0 1/31/2020  2:44 PM   Undermining Ends___ O'Clock 0 1/31/2020  2:44 PM   Undermining Maxium Distance (cm) 0 1/31/2020  2:44 PM   Wound Assessment Red 1/31/2020  2:44 PM   Drainage Amount Moderate 1/31/2020  2:44 PM   Drainage Description Serosanguinous 1/31/2020  2:44 PM   Odor None 1/31/2020  2:44 PM   Margins Defined edges 1/31/2020  2:44 PM   Jackelin-wound Assessment Pink 1/31/2020  2:44 PM   Non-staged Wound Description Full thickness 1/31/2020  2:44 PM   [place]%Wound Bed 0 1/31/2020  2:44 PM   Red%Wound Bed 100 1/31/2020  2:44 PM   Yellow%Wound Bed 0 1/31/2020  2:44 PM   Black%Wound Bed 0 1/31/2020  2:44 PM   Purple%Wound Bed 0 1/31/2020  2:44 PM   Other%Wound Bed 0 1/31/2020  2:44 PM   Number of days: 0       Wound 01/31/20 Pretibial Left;Proximal #7 left medial leg cluster  (Active)   Dressing Status Clean;Dry; Intact 1/31/2020  4:15 PM   Dressing Changed Changed/New 1/31/2020  4:15 PM   Wound Cleansed Soap and water 1/31/2020  2:44 PM   Wound Length (cm) 0.5 cm 1/31/2020  2:44 PM   Wound Width (cm) 0.7 cm 1/31/2020  2:44 PM   Wound Depth (cm) 0.1 cm 1/31/2020  2:44 PM   Wound Surface Area (cm^2) 0.35 cm^2 1/31/2020  2:44 PM   Wound Volume (cm^3) 0.04 cm^3 1/31/2020  2:44 PM   Tunneling Position ___ O'Clock 0 1/31/2020  2:44 PM   Undermining Starts ___ O'Clock 0 1/31/2020  2:44 PM   Undermining Ends___ O'Clock 0 1/31/2020  2:44 PM   Undermining Maxium Distance (cm) 0 1/31/2020  2:44 PM   Wound Assessment Pink 1/31/2020  2:44 PM   Drainage Amount Moderate 1/31/2020  2:44 PM   Drainage Description Serous 1/31/2020  2:44 PM   Odor None 1/31/2020 2:44 PM   Margins Undefined edges 1/31/2020  2:44 PM   Jackelin-wound Assessment Pink 1/31/2020  2:44 PM   Non-staged Wound Description Full thickness 1/31/2020  2:44 PM   [place]%Wound Bed 100 1/31/2020  2:44 PM   Red%Wound Bed 0 1/31/2020  2:44 PM   Yellow%Wound Bed 0 1/31/2020  2:44 PM   Black%Wound Bed 0 1/31/2020  2:44 PM   Purple%Wound Bed 0 1/31/2020  2:44 PM   Other%Wound Bed 0 1/31/2020  2:44 PM   Number of days: 0       Percent of Wound(s) Debrided: approximately 100%    Total  Area  Debrided:  2 sq cm     Bleeding:  Minimal    Hemostasis Achieved:  by pressure    Procedural Pain:  7  / 10     Post Procedural Pain:  2 / 10     Response to treatment:  With complaints of pain. Status of wound progress and description from last visit:   Legs are better, buttock has significant necrosis. Debrided today, will start santyl too. She needs lymphedema pumps for the legs. She certainly qualifies with lymphedema secondary to both cancer and to bilateral hip surgery for pathologic fractures related to the cancer. She has failed the compression we have provided at the wound center, she has been elevating the legs at home with her lift chair. She needs antibiotic ointment for the toe. I suspect. Plan:       Discharge Instructions          PHYSICIAN ORDERS AND DISCHARGE INSTRUCTIONS     NOTE: Upon discharge from the [address], you will receive a patient experience survey.  We would be grateful if you would take the time to fill this survey out.     Wound care order history:                 TOÑO's   N/A--buttock wound              Vascular studies: done in 11/19               Imaging:               Cultures:   Right great toe on 1/31/2020              Labs/ HbA1c: 5.8 in 11/19              Grafts:               HBO: To be determined               Antibiotics:               Earlier Wound care treatments:              Authorizations:               Consults:                           Primary care physician: Den Tobar      Continuing wound care orders and information:              Residence:  Private              Continue home health care with: [facility]               YPNT wound-care supplies will be provided by: [facility]               DME provider:              Compression with N/A              KOC loading:  N/A              WIVPV Medications:              JTCGQ cleansing:                           VY not scrub or use excessive force.                          Wash hands with soap and water before and after dressing changes.                         Prior to applying a clean dressing, cleanse wound with normal saline,                                wound cleanser, or mild soap and water.                           Ask the physician or nurse before getting the wound(s) wet in a shower              Daily Wound management:                          Keep weight off wounds and reposition every 2 hours.                          ISNZU standing for long periods of time.                          QMHNR wraps/stockings in AM and remove at bedtime.                          If swelling is present, elevate legs to the level of the heart or above for 30  minutes 4-5 times a day and/or when sitting.                                                  When taking antibiotics take entire prescription as ordered by physician do not stop taking until medicine is all gone.                                                                 Orders for this week (1/31/20): Right great toe -- wash with soap and water,pat dry. Cover with bactroban to wound bed and cover with band aid. Change daily.      Right Lower leg and posterior ankle - Wash with mild soap and water. Lotrisone to entire leg. Cover open areas with calcium alginate  and  then: Wrap with Unna boot and secure with coban and tape. Leave in place x1 week.        Left medial leg cluster--   Wash with mild soap and water. Lotrisone to entire leg.   Cover open areas with kerramax then :Wrap with conform and tape. Tubi E and wrap with ace wrap.                Buttock cluster- Wash with mild soap and water. Paint with betadine, cover with island border gauze. Change Daily.           Left Proximal Buttock wound-  Wash with Mild soap and water. WOUND VAC THERAPY: DUODERM TO PERIWOUND FOR PROTECTION. APPLY(Santyl in clinic) Sorbact and  BLACK FOAM TO WOUND  SECURE VAC. DRESSING WITH DRAPE. When draping for bridge be careful to have drape under foam ... FOAM NOT TO BE ON SKIN     SET WOUND VAC  CONTINUOUS SUCTION. CANISTER CHANGE WITH EACH DRESSING CHANGE OR ACCORDING TO VOLUME OF DRAINAGE.     Home Care to Change Vac Dressing on Mondays and Wednesdays    Lymphedema pumps referred to [facility]-- 1/31/2020                      Follow up with Dr Los Ramon. In 1 week in the wound care center  Call [phone] for any questions or concerns.   Date__________   Time____________             Treatment Note Wound 01/10/20 #4 Right Leg-Dressing/Treatment: (lotrisone, unna boot, coban )  Wound 01/03/20 #1 Left proximal Buttock-Dressing/Treatment: (santyl, duoderm to angel, black foam, vac drape )  Wound 01/03/20 #2 Left Buttock Cluster-Dressing/Treatment: (Ca+ alg, and island border )  Wound 01/03/20 #3 Right Buttock-Dressing/Treatment: (Ca+ alg, and island border )  Wound 01/31/20 Toe (Comment  which one) Anterior;Right #5 right great toe-Dressing/Treatment: (bactroban & bandaid )  Wound 01/31/20 Ankle Right;Posterior #6 right posterior ankle -Dressing/Treatment: (abd, unna boot, coban)  Wound 01/31/20 Pretibial Left;Proximal #7 left medial leg cluster -Dressing/Treatment: (kerramax, conform & tape, tubi E ace wrap )    Written Patient Dismissal Instructions Given            Electronically signed by Angela Lovelace MD on 1/31/2020 at 5:01 PM

## 2020-02-05 LAB
CULTURE: ABNORMAL
Lab: ABNORMAL
SPECIMEN: ABNORMAL

## 2020-02-07 ENCOUNTER — HOSPITAL ENCOUNTER (OUTPATIENT)
Dept: WOUND CARE | Age: 70
Discharge: HOME | End: 2020-02-07
Payer: MEDICARE

## 2020-02-07 VITALS
DIASTOLIC BLOOD PRESSURE: 69 MMHG | HEART RATE: 91 BPM | TEMPERATURE: 97.8 F | RESPIRATION RATE: 18 BRPM | SYSTOLIC BLOOD PRESSURE: 110 MMHG

## 2020-02-07 PROBLEM — L89.324 STAGE IV PRESSURE ULCER OF LEFT BUTTOCK (HCC): Status: ACTIVE | Noted: 2020-01-03

## 2020-02-07 PROBLEM — L89.44: Status: ACTIVE | Noted: 2020-01-03

## 2020-02-07 PROCEDURE — 11042 DBRDMT SUBQ TIS 1ST 20SQCM/<: CPT

## 2020-02-07 ASSESSMENT — PAIN SCALES - GENERAL: PAINLEVEL_OUTOF10: 0

## 2020-02-07 NOTE — PROGRESS NOTES
tablet Take 15 mg by mouth 2 times daily.  HYDROcodone-acetaminophen (NORCO) 7.5-325 MG per tablet Take 1 tablet by mouth every 6 hours as needed for Pain.  nadolol (CORGARD) 80 MG tablet Take 1 tablet by mouth daily (Patient taking differently: Take 40 mg by mouth daily ) 30 tablet 3    furosemide (LASIX) 20 MG tablet Take 1 tablet by mouth daily As needed for swelling 60 tablet 3    potassium chloride (KLOR-CON M) 20 MEQ TBCR extended release tablet Take 20 mEq by mouth 2 times daily      Alpelisib, 200 MG Daily Dose, (PIQRAY, 200 MG DAILY DOSE,) 200 MG TBPK Take 150 mg by mouth daily (with breakfast)       OLANZapine (ZYPREXA) 2.5 MG tablet Take 2.5 mg by mouth nightly Takes during Chemo      folic acid (FOLVITE) 1 MG tablet Take 800 mcg by mouth daily      dexamethasone (DECADRON) 2 MG tablet Take 1 mg by mouth every other day Alternating with 4mg every other day       dexamethasone (DECADRON) 4 MG tablet Take 4 mg by mouth every other day Alternate 4mg and 2 mg tabs every other day      Mastectomy Bra MISC by Does not apply route 4 each 0    Breast Prosthesis MISC by Does not apply route 1 each 1    ondansetron (ZOFRAN) 8 MG tablet Take 8 mg by mouth every 8 hours as needed for Nausea or Vomiting      vitamin C (ASCORBIC ACID) 500 MG tablet Take 500 mg by mouth daily      acetaminophen (TYLENOL) 325 MG tablet Take 650 mg by mouth every 6 hours as needed for Pain      omeprazole (PRILOSEC) 20 MG delayed release capsule Take 20 mg by mouth daily       Multiple Vitamin (MULTI VITAMIN DAILY PO) Take by mouth      vitamin E 400 UNIT capsule Take 400 Units by mouth daily      aspirin 81 MG tablet Take 81 mg by mouth every other day       Loratadine 10 MG CAPS Take by mouth daily        No current facility-administered medications on file prior to encounter. REVIEW OF SYSTEMS    Pertinent items are noted in HPI.     Constitutional: Negative for systemic symptoms including fever, chills 2:08 PM   Yellow%Wound Bed 90 2/7/2020  2:08 PM   Black%Wound Bed 0 2/7/2020  2:08 PM   Purple%Wound Bed 0 2/7/2020  2:08 PM   Other%Wound Bed 0 2/7/2020  2:08 PM   Number of days: 35       Wound 01/03/20 #2 Left Buttock Cluster (Active)   Wound Image   2/7/2020  2:08 PM   Wound Pressure Stage  2 2/7/2020  2:08 PM   Dressing Status Clean;Dry; Intact 1/31/2020  4:45 PM   Dressing Changed Changed/New 1/31/2020  4:45 PM   Wound Cleansed Rinsed/Irrigated with saline 2/7/2020  2:08 PM   Wound Length (cm) 0 cm 2/7/2020  2:08 PM   Wound Width (cm) 0 cm 2/7/2020  2:08 PM   Wound Depth (cm) 0 cm 2/7/2020  2:08 PM   Wound Surface Area (cm^2) 0 cm^2 2/7/2020  2:08 PM   Change in Wound Size % (l*w) 100 2/7/2020  2:08 PM   Wound Volume (cm^3) 0 cm^3 2/7/2020  2:08 PM   Wound Healing % 100 2/7/2020  2:08 PM   Distance Tunneling (cm) 0 cm 2/7/2020  2:08 PM   Tunneling Position ___ O'Clock 0 2/7/2020  2:08 PM   Undermining Starts ___ O'Clock 0 2/7/2020  2:08 PM   Undermining Ends___ O'Clock 0 2/7/2020  2:08 PM   Undermining Maxium Distance (cm) 0 2/7/2020  2:08 PM   Wound Assessment Pink 2/7/2020  2:08 PM   Drainage Amount None 2/7/2020  2:08 PM   Drainage Description Serosanguinous 1/31/2020  2:44 PM   Odor None 2/7/2020  2:08 PM   Margins Attached edges 2/7/2020  2:08 PM   Jackelin-wound Assessment Pink 2/7/2020  2:08 PM   Non-staged Wound Description Not applicable 8/7/4246  6:72 PM   [place]%Wound Bed 100 2/7/2020  2:08 PM   Red%Wound Bed 0 2/7/2020  2:08 PM   Yellow%Wound Bed 0 2/7/2020  2:08 PM   Black%Wound Bed 0 2/7/2020  2:08 PM   Purple%Wound Bed 0 2/7/2020  2:08 PM   Other%Wound Bed 0 2/7/2020  2:08 PM   Number of days: 35       Wound 01/03/20 #3 Right Buttock (Active)   Wound Image   2/7/2020  2:08 PM   Wound Pressure Stage  2 2/7/2020  2:08 PM   Dressing Status Clean;Dry; Intact 1/31/2020  4:45 PM   Dressing Changed Changed/New 1/31/2020  4:45 PM   Wound Cleansed Rinsed/Irrigated with saline 2/7/2020  2:08 PM   Wound Length (cm) 0.8 cm 2/7/2020  2:08 PM   Wound Width (cm) 0.5 cm 2/7/2020  2:08 PM   Wound Depth (cm) 0.1 cm 2/7/2020  2:08 PM   Wound Surface Area (cm^2) 0.4 cm^2 2/7/2020  2:08 PM   Change in Wound Size % (l*w) 95.15 2/7/2020  2:08 PM   Wound Volume (cm^3) 0.04 cm^3 2/7/2020  2:08 PM   Wound Healing % 95 2/7/2020  2:08 PM   Distance Tunneling (cm) 0 cm 2/7/2020  2:08 PM   Tunneling Position ___ O'Clock 0 2/7/2020  2:08 PM   Undermining Starts ___ O'Clock 0 2/7/2020  2:08 PM   Undermining Ends___ O'Clock 0 2/7/2020  2:08 PM   Undermining Maxium Distance (cm) 0 2/7/2020  2:08 PM   Wound Assessment Yellow;Red 2/7/2020  2:08 PM   Drainage Amount Moderate 2/7/2020  2:08 PM   Drainage Description Serosanguinous 2/7/2020  2:08 PM   Odor None 2/7/2020  2:08 PM   Margins Defined edges 2/7/2020  2:08 PM   Jackelin-wound Assessment Pink 2/7/2020  2:08 PM   Non-staged Wound Description Full thickness 2/7/2020  2:08 PM   [place]%Wound Bed 70 2/7/2020  2:08 PM   Red%Wound Bed 0 2/7/2020  2:08 PM   Yellow%Wound Bed 30 2/7/2020  2:08 PM   Black%Wound Bed 0 2/7/2020  2:08 PM   Purple%Wound Bed 0 2/7/2020  2:08 PM   Other%Wound Bed 0 2/7/2020  2:08 PM   Number of days: 35       Wound 01/10/20 #4 Right Leg (Active)   Wound Image   2/7/2020  2:08 PM   Wound Venous 2/7/2020  2:08 PM   Dressing Status Clean;Dry; Intact 1/31/2020  4:15 PM   Dressing Changed Changed/New 1/31/2020  4:15 PM   Wound Cleansed Rinsed/Irrigated with saline 2/7/2020  2:08 PM   Wound Length (cm) 0 cm 2/7/2020  2:08 PM   Wound Width (cm) 0 cm 2/7/2020  2:08 PM   Wound Depth (cm) 0 cm 2/7/2020  2:08 PM   Wound Surface Area (cm^2) 0 cm^2 2/7/2020  2:08 PM   Change in Wound Size % (l*w) 100 2/7/2020  2:08 PM   Wound Volume (cm^3) 0 cm^3 2/7/2020  2:08 PM   Wound Healing % 100 2/7/2020  2:08 PM   Post-Procedure Length (cm) 16.5 cm 1/10/2020 10:39 AM   Post-Procedure Width (cm) 15 cm 1/10/2020 10:39 AM   Post-Procedure Depth (cm) 0.1 cm 1/10/2020 10:39 AM   Post-Procedure Surface Area (cm^2) 247.5 cm^2 1/10/2020 10:39 AM   Post-Procedure Volume (cm^3) 24.75 cm^3 1/10/2020 10:39 AM   Distance Tunneling (cm) 0 cm 2/7/2020  2:08 PM   Tunneling Position ___ O'Clock 0 2/7/2020  2:08 PM   Undermining Starts ___ O'Clock 0 2/7/2020  2:08 PM   Undermining Ends___ O'Clock 0 2/7/2020  2:08 PM   Undermining Maxium Distance (cm) 0 2/7/2020  2:08 PM   Wound Assessment Pink 2/7/2020  2:08 PM   Drainage Amount None 2/7/2020  2:08 PM   Drainage Description Green 1/31/2020  2:44 PM   Odor None 2/7/2020  2:08 PM   Margins Other (Comment) 2/7/2020  2:08 PM   Jackelin-wound Assessment Dry 2/7/2020  2:08 PM   Non-staged Wound Description Not applicable 9/0/1226  5:38 PM   [place]%Wound Bed 100 2/7/2020  2:08 PM   Red%Wound Bed 0 2/7/2020  2:08 PM   Yellow%Wound Bed 0 2/7/2020  2:08 PM   Black%Wound Bed 0 2/7/2020  2:08 PM   Purple%Wound Bed 0 2/7/2020  2:08 PM   Other%Wound Bed 0 2/7/2020  2:08 PM   Number of days: 28       Wound 01/31/20 Toe (Comment  which one) Anterior;Right #5 right great toe (Active)   Wound Image   2/7/2020  2:08 PM   Dressing Status Clean;Dry; Intact 2/7/2020  4:08 PM   Dressing Changed Changed/New 2/7/2020  4:08 PM   Wound Cleansed Soap and water 2/7/2020  2:08 PM   Wound Length (cm) 1 cm 2/7/2020  2:08 PM   Wound Width (cm) 0.3 cm 2/7/2020  2:08 PM   Wound Depth (cm) 0.1 cm 2/7/2020  2:08 PM   Wound Surface Area (cm^2) 0.3 cm^2 2/7/2020  2:08 PM   Change in Wound Size % (l*w) 0 2/7/2020  2:08 PM   Wound Volume (cm^3) 0.03 cm^3 2/7/2020  2:08 PM   Wound Healing % 0 2/7/2020  2:08 PM   Distance Tunneling (cm) 0 cm 2/7/2020  2:08 PM   Tunneling Position ___ O'Clock 0 2/7/2020  2:08 PM   Undermining Starts ___ O'Clock 0 2/7/2020  2:08 PM   Undermining Ends___ O'Clock 0 2/7/2020  2:08 PM   Undermining Maxium Distance (cm) 0 2/7/2020  2:08 PM   Wound Assessment Red 2/7/2020  2:08 PM   Drainage Amount Moderate 2/7/2020  2:08 PM   Drainage Description Serosanguinous 2/7/2020  2:08 PM   Odor None 2/7/2020 Undermining Ends___ O'Clock 0 2/7/2020  2:08 PM   Undermining Maxium Distance (cm) 0 2/7/2020  2:08 PM   Number of days: 0       Wound 02/07/20 Right;Posterior #9 right posterior distal lower leg  (Active)   Wound Image   2/7/2020  2:08 PM   Dressing Status Clean;Dry; Intact 2/7/2020  4:08 PM   Dressing Changed Changed/New 2/7/2020  4:08 PM   Wound Length (cm) 1.3 cm 2/7/2020  2:08 PM   Wound Width (cm) 1.6 cm 2/7/2020  2:08 PM   Wound Depth (cm) 0.1 cm 2/7/2020  2:08 PM   Wound Surface Area (cm^2) 2.08 cm^2 2/7/2020  2:08 PM   Wound Volume (cm^3) 0.21 cm^3 2/7/2020  2:08 PM   Distance Tunneling (cm) 0 cm 2/7/2020  2:08 PM   Tunneling Position ___ O'Clock 0 2/7/2020  2:08 PM   Undermining Starts ___ O'Clock 0 2/7/2020  2:08 PM   Undermining Ends___ O'Clock 0 2/7/2020  2:08 PM   Undermining Maxium Distance (cm) 0 2/7/2020  2:08 PM   Number of days: 0       Percent of Wound(s) Debrided: approximately 100%    Total  Area  Debrided:  0.85 sq cm     Bleeding:  Minimal    Hemostasis Achieved:  by pressure    Procedural Pain:  8  / 10     Post Procedural Pain:  2 / 10     Response to treatment:  With complaints of pain. Status of wound progress and description from last visit:   Slightly more granulated today. Still significant necrosis. .      Plan:       Discharge Instructions          PHYSICIAN ORDERS AND DISCHARGE INSTRUCTIONS     NOTE: Upon discharge from the [address], you will receive a patient experience survey.  We would be grateful if you would take the time to fill this survey out.     Wound care order history:                 TOÑO's   N/A--buttock wound              Vascular studies: done in 11/19               Imaging:               Cultures:   Right great toe on 1/31/2020              Labs/ HbA1c: 5.8 in 11/19              Grafts:               HBO: To be determined               Antibiotics:               Earlier Wound care treatments:              Authorizations:               Consults:                         Primary care physician: Lynn Tucker      Continuing wound care orders and information:              Residence:  Private              Continue home health care with: [facility]               [facility] wound-care supplies will be provided by: [facility]               DME provider:              Compression with N/A              ZFQ loading:  N/A              SFJYE Medications:              MGTZN cleansing:                           DN not scrub or use excessive force.                          Wash hands with soap and water before and after dressing changes.                         Prior to applying a clean dressing, cleanse wound with normal saline,                                wound cleanser, or mild soap and water.                           Ask the physician or nurse before getting the wound(s) wet in a shower              Daily Wound management:                          Keep weight off wounds and reposition every 2 hours.                          XUYRE standing for long periods of time.                          ELJDZ wraps/stockings in AM and remove at bedtime.                          If swelling is present, elevate legs to the level of the heart or above for 30  minutes 4-5 times a day and/or when sitting.                                                  When taking antibiotics take entire prescription as ordered by physician do not stop taking until medicine is all gone.                                                                 Orders for this week (2/7/20):     Right great toe -- wash with soap and water,pat dry. Cover with bactroban to wound bed and cover with band aid. Change daily.       Right and Left lower legs--   Wash with mild soap and water. Lotrisone to entire leg. Cover open areas with kerramax then :Wrap with conform and tape. Tubi E and wrap with ace wrap.                Buttock cluster- Wash with mild soap and water. Paint with betadine, cover with island border gauze.  Change Daily.           Left Proximal Buttock wound-  In Clinic only --  Apply Santyl to wound bed. Gently pack with saline damp 4x4, cover with abd and secure with tape. Leave in place    Home health-- Please see as soon as possible to place wound vac. Wash with Mild soap and water. WOUND VAC THERAPY: DUODERM TO PERIWOUND FOR PROTECTION. APPLY(Santyl in clinic) Sorbact and [address]. DRESSING WITH DRAPE. When draping for bridge be careful to have drape under foam ... FOAM NOT TO BE ON SKIN     SET WOUND VAC  CONTINUOUS SUCTION. CANISTER CHANGE WITH EACH DRESSING CHANGE OR ACCORDING TO VOLUME OF DRAINAGE.     Home Care to Change Vac Dressing on Mondays and Wednesdays     Lymphedema pumps referred to [facility]-- 1/31/2020                      Follow up with Dr Ruben Field. In 1 week in the wound care center  Call [phone] for any questions or concerns.   Date__________   Time____________              Treatment Note Wound 01/31/20 Toe (Comment  which one) Anterior;Right #5 right great toe-Dressing/Treatment: (bactroban & bandaid )  Wound 01/31/20 Ankle Right;Posterior #6 right posterior ankle -Dressing/Treatment: (kerra max coonform lotrisone)  Wound 01/31/20 Pretibial Left;Proximal #7 left medial leg cluster -Dressing/Treatment: (kerramax, conform & tape, tubi E )  Wound 02/07/20 Right;Posterior #8 right posterior prox lower leg -Dressing/Treatment: (kerra max coonform lotrisone tubi e)  Wound 02/07/20 Right;Posterior #9 right posterior distal lower leg -Dressing/Treatment: Darylene Nab max coonform lotrisone tubi e)    Written Patient Dismissal Instructions Given            Electronically signed by Zechariah Lloyd MD on 2/7/2020 at 4:19 PM

## 2020-02-07 NOTE — PROGRESS NOTES
Nonselective enzymatic debridement performed with Santyl per physician order to wound(s) of the buttock  Patient tolerated the procedure well.      Electronically signed by Lucia Lucero RN on 2/7/2020 at 4:35 PM

## 2020-02-14 ENCOUNTER — HOSPITAL ENCOUNTER (OUTPATIENT)
Dept: WOUND CARE | Age: 70
Discharge: HOME | End: 2020-02-14
Payer: MEDICARE

## 2020-02-14 VITALS
HEART RATE: 114 BPM | TEMPERATURE: 97.4 F | RESPIRATION RATE: 16 BRPM | DIASTOLIC BLOOD PRESSURE: 78 MMHG | SYSTOLIC BLOOD PRESSURE: 124 MMHG

## 2020-02-14 PROCEDURE — 11042 DBRDMT SUBQ TIS 1ST 20SQCM/<: CPT

## 2020-02-14 NOTE — PROGRESS NOTES
tablet Take 2.5 mg by mouth nightly Takes during Chemo      folic acid (FOLVITE) 1 MG tablet Take 800 mcg by mouth daily      vitamin C (ASCORBIC ACID) 500 MG tablet Take 500 mg by mouth daily      omeprazole (PRILOSEC) 20 MG delayed release capsule Take 20 mg by mouth daily       Multiple Vitamin (MULTI VITAMIN DAILY PO) Take by mouth      vitamin E 400 UNIT capsule Take 400 Units by mouth daily      aspirin 81 MG tablet Take 81 mg by mouth every other day       Loratadine 10 MG CAPS Take by mouth daily       morphine (MSIR) 15 MG tablet Take 15 mg by mouth 2 times daily.  furosemide (LASIX) 20 MG tablet Take 1 tablet by mouth daily As needed for swelling 60 tablet 3    dexamethasone (DECADRON) 2 MG tablet Take 1 mg by mouth every other day Alternating with 4mg every other day       dexamethasone (DECADRON) 4 MG tablet Take 4 mg by mouth every other day Alternate 4mg and 2 mg tabs every other day      Mastectomy Bra MISC by Does not apply route 4 each 0    Breast Prosthesis MISC by Does not apply route 1 each 1    ondansetron (ZOFRAN) 8 MG tablet Take 8 mg by mouth every 8 hours as needed for Nausea or Vomiting      acetaminophen (TYLENOL) 325 MG tablet Take 650 mg by mouth every 6 hours as needed for Pain       No current facility-administered medications on file prior to encounter. REVIEW OF SYSTEMS    Pertinent items are noted in HPI. Constitutional: Negative for systemic symptoms including fever, chills and malaise. Objective:      /78   Pulse 114   Temp 97.4 °F (36.3 °C) (Temporal)   Resp 16   LMP 01/13/2005     PHYSICAL EXAM      General: The patient is in no acute distress. Mental status:  Patient is appropriate, is  oriented to place and plan of care.   Dermatologic exam: Visual inspection of the periwound reveals the skin to be normal in turgor and texture  Wound exam: see wound description below in procedure note      Assessment:     Problem List Items Addressed This Visit     WD-Stage IV pressure ulcer of left buttock ([facility] [address] - Primary    WD-Pressure injury of contiguous region involving back and right buttock, stage 2 (HCC)    WD-Venous stasis ulcer of right lower leg with edema of right lower leg ([address]    WD-Lymphedema of both lower extremities        Procedure Note    Indications:  Based on my examination of this patient's wound(s) today, sharp excision into necrotic epidermis, dermis and subcutaneous tissue is required to promote healing and evaluate the extent of previous healing. Performed by: Rubio Mcgovern MD    Consent obtained: Yes    Time out taken:  Yes    Pain Control: Anesthetic  Anesthetic: 4% Lidocaine Liquid Topical     Debridement:Excisional Debridement    Using curette the wound(s) was/were sharply debrided down through and including the removal of epidermis, dermis and subcutaneous tissue. Devitalized Tissue Debrided:  fibrin, biofilm, slough and exudate    Pre Debridement Measurements:  Are located in the Wound Documentation Flow Sheet    All active wounds listed below with today's date are evaluated  Wound(s)    debrided this date include # : 1     Post  Debridement Measurements:  Negative Pressure Wound Therapy (Active)   Unit Type KCI  1/31/2020  4:45 PM   Dressing Type Black foam 1/31/2020  4:45 PM   Number of pieces used 1 1/31/2020  4:45 PM   Cycle Continuous 1/31/2020  4:45 PM   Target Pressure (mmHg) 125 1/31/2020  4:45 PM   Intensity 5 1/31/2020  4:45 PM   Canister changed? Yes 1/31/2020  4:45 PM   Dressing Status Clean;Dry; Intact 1/31/2020  4:45 PM   Dressing Changed Changed/New 1/31/2020  4:45 PM   Drainage Amount Moderate 1/31/2020  4:45 PM   Drainage Description Serosanguinous 1/31/2020  4:45 PM   Wound Assessment Pink 1/31/2020  4:45 PM   Jackelin-wound Assessment Clean 1/31/2020  4:45 PM   Odor Mild 1/31/2020  4:45 PM   Number of days: 21       Wound 11/04/19 Buttocks Left;Upper (Active)   Number of days: 102       Wound 01/03/20 #1 Left proximal Buttock (Active)   Wound Image   2/14/2020  2:19 PM   Wound Pressure Stage  2 2/14/2020  2:19 PM   Dressing Status Clean;Dry; Intact 2/14/2020  4:00 PM   Dressing Changed Changed/New 2/14/2020  4:00 PM   Wound Cleansed Rinsed/Irrigated with saline 2/14/2020  2:19 PM   Wound Length (cm) 1.6 cm 2/14/2020  2:19 PM   Wound Width (cm) 0.6 cm 2/14/2020  2:19 PM   Wound Depth (cm) 1.9 cm 2/14/2020  2:19 PM   Wound Surface Area (cm^2) 0.96 cm^2 2/14/2020  2:19 PM   Change in Wound Size % (l*w) -14.29 2/14/2020  2:19 PM   Wound Volume (cm^3) 1.82 cm^3 2/14/2020  2:19 PM   Wound Healing % -14 2/14/2020  2:19 PM   Post-Procedure Length (cm) 1.7 cm 2/7/2020  4:08 PM   Post-Procedure Width (cm) 0.5 cm 2/7/2020  4:08 PM   Post-Procedure Depth (cm) 2.3 cm 2/7/2020  4:08 PM   Post-Procedure Surface Area (cm^2) 0.85 cm^2 2/7/2020  4:08 PM   Post-Procedure Volume (cm^3) 1.96 cm^3 2/7/2020  4:08 PM   Distance Tunneling (cm) 0 cm 2/14/2020  2:19 PM   Tunneling Position ___ O'Clock 0 2/14/2020  2:19 PM   Undermining Starts ___ O'Clock 1300 2/14/2020  2:19 PM   Undermining Ends___ O'Clock 1700 2/14/2020  2:19 PM   Undermining Maxium Distance (cm) 2.8 2/14/2020  2:19 PM   Wound Assessment Pink; White 2/14/2020  2:19 PM   Drainage Amount Moderate 2/14/2020  2:19 PM   Drainage Description Serosanguinous 2/14/2020  2:19 PM   Odor Strong 2/14/2020  2:19 PM   Margins Defined edges 2/14/2020  2:19 PM   Jackelin-wound Assessment Intact 2/14/2020  2:19 PM   Non-staged Wound Description Full thickness 2/14/2020  2:19 PM   [place]%Wound Bed 10 2/14/2020  2:19 PM   Red%Wound Bed 0 2/14/2020  2:19 PM   Yellow%Wound Bed 90 2/14/2020  2:19 PM   Black%Wound Bed 0 2/14/2020  2:19 PM   Purple%Wound Bed 0 2/14/2020  2:19 PM   Other%Wound Bed 0 2/14/2020  2:19 PM   Number of days: 42       Wound 01/03/20 #3 Right Buttock (Active)   Wound Image   2/14/2020  2:19 PM   Wound Pressure Stage  2 2/14/2020  2:19 PM   Dressing Status changes.                         Prior to applying a clean dressing, cleanse wound with normal saline,                                wound cleanser, or mild soap and water.                           Ask the physician or nurse before getting the wound(s) wet in a shower              Daily Wound management:                          Keep weight off wounds and reposition every 2 hours.                          YGXNU standing for long periods of time.                          YCNZX wraps/stockings in AM and remove at bedtime.                          If swelling is present, elevate legs to the level of the heart or above for 30  minutes 4-5 times a day and/or when sitting.                                                  When taking antibiotics take entire prescription as ordered by physician do not stop taking until medicine is all gone.                                                                 Orders for this week (2/14/20):     Right great toe -- healed 2/14/2020      Right thigh-- Cover open areas with kerramax then :Wrap with conform and tape. Wrap with ace wrap.             Left lower legs--   Wash with mild soap and water. Lotrisone to entire leg.  Cover open areas with kerramax then :Wrap with conform and tape. Tubi E and wrap with ace wrap.              Buttock cluster- Wash with mild soap and water. Paint with betadine, cover with island border gauze. Change Daily.           Left Proximal Buttock wound-  In Clinic only --  Apply Santyl to wound bed. Gently pack with saline damp plain packing 1/4\" ,   gentle border. Change daily        Wash with Mild soap and water.       WOUND VAC THERAPY: hold for 1 week starting 2/14/2020              Lymphedema pumps referred to [facility]-- 1/31/2020-- received                       Follow up with Dr Chris Shukla In 1 week in the wound care center  Call [phone] for any questions or concerns.   Date__________   Time____________              Treatment Note Wound 01/31/20

## 2020-02-21 ENCOUNTER — HOSPITAL ENCOUNTER (OUTPATIENT)
Dept: WOUND CARE | Age: 70
Discharge: HOME | End: 2020-02-21
Payer: MEDICARE

## 2020-02-21 VITALS
RESPIRATION RATE: 16 BRPM | DIASTOLIC BLOOD PRESSURE: 84 MMHG | SYSTOLIC BLOOD PRESSURE: 143 MMHG | TEMPERATURE: 97.4 F | HEART RATE: 121 BPM

## 2020-02-21 PROCEDURE — 11042 DBRDMT SUBQ TIS 1ST 20SQCM/<: CPT

## 2020-02-21 NOTE — PLAN OF CARE
Problem: Pain:  Goal: Pain level will decrease  Description  Pain level will decrease  Outcome: Ongoing  Goal: Control of acute pain  Description  Control of acute pain  Outcome: Ongoing  Goal: Control of chronic pain  Description  Control of chronic pain  Outcome: Ongoing     Problem: Wound:  Goal: Will show signs of wound healing; wound closure and no evidence of infection  Description  Will show signs of wound healing; wound closure and no evidence of infection  Outcome: Ongoing  Note:   See flowsheet      Problem: Pain:  Intervention: Opioid analgesia side-effects  Note:   See flowsheet   Intervention: Assess barriers to pain control  Note:   See flowsheet   Intervention: Promote participation in pain management plan  Note:   See flowsheet      Problem: Wound:  Intervention: Assess ankle, calf, or foot circumference blilaterally  Note:   See flowsheet   Intervention: Assess pain status  Note:   See flowsheet   Intervention: Assess wound size, appearance and drainage  Note:   See flowsheet   Intervention: Assess pedal pulses bilaterally if patient has a foot or leg ulcer  Note:   See flowsheet   Intervention: Doppler if unable to palpate pedal pulse  Note:   See flowsheet

## 2020-02-21 NOTE — PROGRESS NOTES
Wound Care Center Progress Note With Procedure    Andres Barbour  AGE: 72 y.o. GENDER: female  : 1950  EPISODE DATE:  2020     Subjective:     Chief Complaint   Patient presents with    Wound Check     bilateral legs and buttock         HISTORY of PRESENT ILLNESS      Andres Barbour is a 72 y.o. female who presents today for wound evaluation of Chronic venous, pressure and lymphedema ulcer(s) of the bilateral legs (lymphedema) and buttocks, pressure. The ulcer of the buttock is of marked severity. The ulcers on the legs are mild. The underlying cause of the wound is lymphedema on the legs, pressure on the buttock. We held the vac due to necrotic tissue and used santyl daily- this has helped, but she still has too much necrosis to place the vac- will hold another week to apply our Santyl. Legs are about the same.     Wound Pain Timing/Severity: mild  Quality of pain: tender  Severity of pain:  1 / 10   Modifying Factors: edema, lymphedema, chronic pressure and decreased mobility  Associated Signs/Symptoms: none        PAST MEDICAL HISTORY        Diagnosis Date    Anemia     Arthritis     left hip    Cancer ([address] 2014    breast cancer( left)- tx with surgery, chemo and radiation- follows with Dr Patti Gilbert and Dr Chandrakant Johansen Diverticulosis     Edema     Esophagitis     GERD (gastroesophageal reflux disease)     Glaucoma     \"they say I have in the left eye but no treatment yet\"    Hip fracture ([address]     History of blood transfusion     \"in 1974 after surgery    History of kidney stones     \"with the testing shows I have small kidney stones both kidneys but no trouble with them\"    Hypertension     Lymphedema     Metastatic breast cancer ([address]     Osteoarthritis     Status post bilateral total hip replacement 2020    Tachycardia     WD-Lymphedema of both lower extremities 2020    WD-Pressure injury of contiguous region involving back and right buttock, stage 2 ([address] (Active)   Number of days: 109       Wound 01/03/20 #1 Left proximal Buttock (Active)   Wound Image   2/14/2020  2:19 PM   Wound Pressure Stage  2 2/21/2020  2:41 PM   Dressing Status Clean;Dry; Intact 2/21/2020  3:49 PM   Dressing Changed Changed/New 2/21/2020  3:49 PM   Wound Cleansed Rinsed/Irrigated with saline 2/21/2020  2:41 PM   Wound Length (cm) 1.7 cm 2/21/2020  2:41 PM   Wound Width (cm) 0.7 cm 2/21/2020  2:41 PM   Wound Depth (cm) 1.8 cm 2/21/2020  2:41 PM   Wound Surface Area (cm^2) 1.19 cm^2 2/21/2020  2:41 PM   Change in Wound Size % (l*w) -41.67 2/21/2020  2:41 PM   Wound Volume (cm^3) 2.14 cm^3 2/21/2020  2:41 PM   Wound Healing % -34 2/21/2020  2:41 PM   Post-Procedure Length (cm) 1.7 cm 2/21/2020  3:28 PM   Post-Procedure Width (cm) 0.7 cm 2/21/2020  3:28 PM   Post-Procedure Depth (cm) 1.8 cm 2/21/2020  3:28 PM   Post-Procedure Surface Area (cm^2) 1.19 cm^2 2/21/2020  3:28 PM   Post-Procedure Volume (cm^3) 2.14 cm^3 2/21/2020  3:28 PM   Distance Tunneling (cm) 0 cm 2/21/2020  2:41 PM   Tunneling Position ___ O'Clock 0 2/21/2020  2:41 PM   Undermining Starts ___ O'Clock 1300 2/21/2020  2:41 PM   Undermining Ends___ O'Clock 1700 2/21/2020  2:41 PM   Undermining Maxium Distance (cm) 1.7 2/21/2020  2:41 PM   Wound Assessment Pink;Yellow 2/21/2020  2:41 PM   Drainage Amount Moderate 2/21/2020  2:41 PM   Drainage Description Serosanguinous 2/21/2020  2:41 PM   Odor Strong 2/21/2020  2:41 PM   Margins Defined edges 2/21/2020  2:41 PM   Jackelin-wound Assessment Intact 2/21/2020  2:41 PM   Non-staged Wound Description Full thickness 2/21/2020  2:41 PM   [place]%Wound Bed 70 2/21/2020  2:41 PM   Red%Wound Bed 0 2/21/2020  2:41 PM   Yellow%Wound Bed 30 2/21/2020  2:41 PM   Black%Wound Bed 0 2/21/2020  2:41 PM   Purple%Wound Bed 0 2/21/2020  2:41 PM   Other%Wound Bed 0 2/21/2020  2:41 PM   Number of days: 49       Wound 01/03/20 #3 Right Buttock (Active)   Wound Image   2/14/2020  2:19 PM   Wound Pressure Stage  2 2/21/2020  2:41 PM   Dressing Status Clean;Dry; Intact 2/21/2020  3:49 PM   Dressing Changed Changed/New 2/21/2020  3:49 PM   Wound Cleansed Rinsed/Irrigated with saline 2/21/2020  2:41 PM   Wound Length (cm) 0.8 cm 2/21/2020  2:41 PM   Wound Width (cm) 0.5 cm 2/21/2020  2:41 PM   Wound Depth (cm) 0.1 cm 2/21/2020  2:41 PM   Wound Surface Area (cm^2) 0.4 cm^2 2/21/2020  2:41 PM   Change in Wound Size % (l*w) 95.15 2/21/2020  2:41 PM   Wound Volume (cm^3) 0.04 cm^3 2/21/2020  2:41 PM   Wound Healing % 95 2/21/2020  2:41 PM   Distance Tunneling (cm) 0 cm 2/21/2020  2:41 PM   Tunneling Position ___ O'Clock 0 2/21/2020  2:41 PM   Undermining Starts ___ O'Clock 0 2/21/2020  2:41 PM   Undermining Ends___ O'Clock 0 2/21/2020  2:41 PM   Undermining Maxium Distance (cm) 0 2/21/2020  2:41 PM   Wound Assessment Red 2/21/2020  2:41 PM   Drainage Amount Moderate 2/21/2020  2:41 PM   Drainage Description Serosanguinous 2/21/2020  2:41 PM   Odor None 2/21/2020  2:41 PM   Margins Defined edges 2/21/2020  2:41 PM   Jackelin-wound Assessment Pink 2/21/2020  2:41 PM   Non-staged Wound Description Full thickness 2/21/2020  2:41 PM   [place]%Wound Bed 0 2/21/2020  2:41 PM   Red%Wound Bed 100 2/21/2020  2:41 PM   Yellow%Wound Bed 0 2/21/2020  2:41 PM   Black%Wound Bed 0 2/21/2020  2:41 PM   Purple%Wound Bed 0 2/21/2020  2:41 PM   Other%Wound Bed 0 2/21/2020  2:41 PM   Number of days: 49       Wound 01/31/20 Ankle Right;Posterior #6 right posterior ankle  (Active)   Wound Image   2/14/2020  2:19 PM   Dressing Status Clean;Dry; Intact 2/21/2020  3:49 PM   Dressing Changed Changed/New 2/21/2020  3:49 PM   Wound Cleansed Rinsed/Irrigated with saline 2/21/2020  2:41 PM   Wound Length (cm) 0.3 cm 2/21/2020  2:41 PM   Wound Width (cm) 0.8 cm 2/21/2020  2:41 PM   Wound Depth (cm) 0.1 cm 2/21/2020  2:41 PM   Wound Surface Area (cm^2) 0.24 cm^2 2/21/2020  2:41 PM   Change in Wound Size % (l*w) 33.33 2/21/2020  2:41 PM   Wound Volume (cm^3) 0.02 cm^3 Odor None 2/21/2020  2:41 PM   Margins Defined edges 2/21/2020  2:41 PM   Jackelin-wound Assessment Pink 2/21/2020  2:41 PM   Non-staged Wound Description Full thickness 2/21/2020  2:41 PM   [place]%Wound Bed 75 2/21/2020  2:41 PM   Red%Wound Bed 25 2/21/2020  2:41 PM   Yellow%Wound Bed 0 2/21/2020  2:41 PM   Black%Wound Bed 0 2/21/2020  2:41 PM   Purple%Wound Bed 0 2/21/2020  2:41 PM   Other%Wound Bed 0 2/21/2020  2:41 PM   Number of days: 21       Wound 02/07/20 Right;Posterior #8 right posterior prox lower leg  (Active)   Wound Image   2/14/2020  2:19 PM   Dressing Status Clean;Dry; Intact 2/21/2020  3:49 PM   Dressing Changed Changed/New 2/21/2020  3:49 PM   Wound Cleansed Rinsed/Irrigated with saline 2/21/2020  2:41 PM   Wound Length (cm) 0 cm 2/21/2020  2:41 PM   Wound Width (cm) 0 cm 2/21/2020  2:41 PM   Wound Depth (cm) 0 cm 2/21/2020  2:41 PM   Wound Surface Area (cm^2) 0 cm^2 2/21/2020  2:41 PM   Change in Wound Size % (l*w) 100 2/21/2020  2:41 PM   Wound Volume (cm^3) 0 cm^3 2/21/2020  2:41 PM   Wound Healing % 100 2/21/2020  2:41 PM   Distance Tunneling (cm) 0 cm 2/21/2020  2:41 PM   Tunneling Position ___ O'Clock 0 2/21/2020  2:41 PM   Undermining Starts ___ O'Clock 0 2/21/2020  2:41 PM   Undermining Ends___ O'Clock 0 2/21/2020  2:41 PM   Undermining Maxium Distance (cm) 0 2/21/2020  2:41 PM   Wound Assessment Pink 2/21/2020  2:41 PM   Drainage Amount None 2/21/2020  2:41 PM   Drainage Description Green;Yellow 2/14/2020  2:19 PM   Odor None 2/21/2020  2:41 PM   Margins Attached edges 2/21/2020  2:41 PM   Jackelin-wound Assessment Pink 2/21/2020  2:41 PM   Non-staged Wound Description Not applicable 8/62/3727  4:98 PM   [place]%Wound Bed 100 2/21/2020  2:41 PM   Red%Wound Bed 0 2/21/2020  2:41 PM   Yellow%Wound Bed 0 2/21/2020  2:41 PM   Black%Wound Bed 0 2/21/2020  2:41 PM   Purple%Wound Bed 0 2/21/2020  2:41 PM   Other%Wound Bed 0 2/21/2020  2:41 PM   Number of days: 14       Wound 02/07/20 Right;Posterior #9 right Size % (l*w) 20 2/21/2020  2:41 PM   Wound Volume (cm^3) 0.09 cm^3 2/21/2020  2:41 PM   Wound Healing % 18 2/21/2020  2:41 PM   Distance Tunneling (cm) 0 cm 2/21/2020  2:41 PM   Tunneling Position ___ O'Clock 0 2/21/2020  2:41 PM   Undermining Starts ___ O'Clock 0 2/21/2020  2:41 PM   Undermining Ends___ O'Clock 0 2/21/2020  2:41 PM   Undermining Maxium Distance (cm) 0 2/21/2020  2:41 PM   Wound Assessment Pink;Yellow 2/21/2020  2:41 PM   Drainage Amount Moderate 2/21/2020  2:41 PM   Drainage Description Serosanguinous 2/21/2020  2:41 PM   Odor None 2/21/2020  2:41 PM   Margins Defined edges 2/21/2020  2:41 PM   Jackelin-wound Assessment Pink 2/21/2020  2:41 PM   Non-staged Wound Description Full thickness 2/21/2020  2:41 PM   [place]%Wound Bed 20 2/21/2020  2:41 PM   Red%Wound Bed 0 2/21/2020  2:41 PM   Yellow%Wound Bed 80 2/21/2020  2:41 PM   Black%Wound Bed 0 2/21/2020  2:41 PM   Purple%Wound Bed 0 2/21/2020  2:41 PM   Other%Wound Bed 0 2/21/2020  2:41 PM   Number of days: 7       Percent of Wound(s) Debrided: approximately 100%    Total  Area  Debrided:  1.19 sq cm     Bleeding:  Minimal    Hemostasis Achieved:  by pressure    Procedural Pain:  3  / 10     Post Procedural Pain:  1 / 10     Response to treatment:  Well tolerated by patient. Status of wound progress and description from last visit:   Improved today- better granulation in the buttock ulcer. Still significant slough. Plan:       Discharge Instructions          PHYSICIAN ORDERS AND DISCHARGE INSTRUCTIONS     NOTE: Upon discharge from the [address], you will receive a patient experience survey.  We would be grateful if you would take the time to fill this survey out.     Wound care order history:                 TOÑO's   N/A--buttock wound              Vascular studies: done in 11/19               Imaging:               Cultures:   Right great toe on 1/31/2020              Labs/ HbA1c: 5.8 in 11/19              Grafts:               HBO: To be determined               Antibiotics:               Earlier Wound care treatments:              Authorizations:               Consults:                           Primary care physician: Lynn Tucker      Continuing wound care orders and information:              Residence:  Private              Continue home health care with: [facility]               [facility] wound-care supplies will be provided by: [facility]               DME provider:              Compression with N/A              ICC loading:  N/A              EVQNK Medications:              SLWWJ cleansing:                           PL not scrub or use excessive force.                          Wash hands with soap and water before and after dressing changes.                         Prior to applying a clean dressing, cleanse wound with normal saline,                                wound cleanser, or mild soap and water.                           Ask the physician or nurse before getting the wound(s) wet in a shower              Daily Wound management:                          Keep weight off wounds and reposition every 2 hours.                          EMDUR standing for long periods of time.                          GORCS wraps/stockings in AM and remove at bedtime.                          If swelling is present, elevate legs to the level of the heart or above for 30  minutes 4-5 times a day and/or when sitting.                                                  When taking antibiotics take entire prescription as ordered by physician do not stop taking until medicine is all gone.                                                                 Orders for this week (2/21/20):     Right great toe -- healed 2/14/2020    Left forearm-- wash with soap and water, pat dry. Cover with kerramax and TUBI D change daily.      Right thigh-- Cover open areas with kerramax then :Wrap with conform and tape.   Wrap with ace wrap.              Left lower legs--   Wash with mild soap and water. Lotrisone to entire leg.  Cover open areas with kerramax then :Wrap with conform and tape. Tubi E and wrap with ace wrap.              Buttock cluster- Wash with mild soap and water. Paint with betadine, cover with island border gauze. Change Daily.           Left Proximal Buttock wound-  In Clinic only --  Apply Santyl to wound bed. Gently pack with saline damp plain packing 1/4\" ,   gentle border. Change daily         Wash with Mild soap and water.       WOUND VAC THERAPY: hold for 1 week starting 2/21/2020                 Lymphedema pumps referred to [facility]-- 1/31/2020-- received                       Follow up with Dr Pratik Guzmán In 1 week in the wound care center  Call [phone] for any questions or concerns.   Date__________   Time____________             Treatment Note Wound 01/03/20 #3 Right Buttock-Dressing/Treatment: (betadine, border gauze )  Wound 01/03/20 #1 Left proximal Buttock-Dressing/Treatment: (santayl saline moist 1/4 inch plain packing border)  Wound 02/14/20 Tibial Left;Proximal;Posterior #10-Dressing/Treatment: (lotrisone keramax conform tubi e ace bandage)  Wound 02/07/20 Right;Posterior #8 right posterior prox lower leg -Dressing/Treatment: (lotrisone keramax conform tubi e ace bandage)  Wound 02/07/20 Right;Posterior #9 right posterior distal lower leg -Dressing/Treatment: (lotrisone keramax conform tubi e)  Wound 01/31/20 Ankle Right;Posterior #6 right posterior ankle -Dressing/Treatment: (lotrisone keramax conform tubi e)  Wound 01/31/20 Pretibial Left;Proximal #7 left medial leg cluster -Dressing/Treatment: (lotrisone keramax conform tubi e)    Written Patient Dismissal Instructions Given            Electronically signed by Eros Foote MD on 2/21/2020 at 4:07 PM

## 2020-02-21 NOTE — PROGRESS NOTES
Nonselective enzymatic debridement performed with Santyl per physician order to wound left prox buttocks. Patient tolerated the procedure well.          .Electronically signed by Christina Duncan LPN on 9/83/9282 at 1:08 PM

## 2020-02-24 ENCOUNTER — HOSPITAL ENCOUNTER (OUTPATIENT)
Dept: INFUSION THERAPY | Age: 70
Discharge: HOME | End: 2020-02-24
Payer: MEDICARE

## 2020-02-24 LAB
ALBUMIN SERPL-MCNC: 2.7 GM/DL (ref 3.4–5)
ALP BLD-CCNC: 464 IU/L (ref 40–128)
ALT SERPL-CCNC: 40 U/L (ref 10–40)
ANION GAP SERPL CALCULATED.3IONS-SCNC: 12 MMOL/L (ref 4–16)
AST SERPL-CCNC: 56 IU/L (ref 15–37)
BASOPHILS ABSOLUTE: 0 K/CU MM
BASOPHILS RELATIVE PERCENT: 0.3 % (ref 0–1)
BILIRUB SERPL-MCNC: 0.7 MG/DL (ref 0–1)
BUN BLDV-MCNC: 12 MG/DL (ref 6–23)
CALCIUM SERPL-MCNC: 8 MG/DL (ref 8.3–10.6)
CHLORIDE BLD-SCNC: 98 MMOL/L (ref 99–110)
CO2: 29 MMOL/L (ref 21–32)
CREAT SERPL-MCNC: 0.6 MG/DL (ref 0.6–1.1)
DIFFERENTIAL TYPE: ABNORMAL
EOSINOPHILS ABSOLUTE: 0.1 K/CU MM
EOSINOPHILS RELATIVE PERCENT: 0.8 % (ref 0–3)
GFR AFRICAN AMERICAN: >60 ML/MIN/1.73M2
GFR NON-AFRICAN AMERICAN: >60 ML/MIN/1.73M2
GLUCOSE BLD-MCNC: 152 MG/DL (ref 70–99)
HCT VFR BLD CALC: 35 % (ref 37–47)
HEMOGLOBIN: 11.7 GM/DL (ref 12.5–16)
LYMPHOCYTES ABSOLUTE: 1 K/CU MM
LYMPHOCYTES RELATIVE PERCENT: 13.5 % (ref 24–44)
MCH RBC QN AUTO: 35.1 PG (ref 27–31)
MCHC RBC AUTO-ENTMCNC: 33.4 % (ref 32–36)
MCV RBC AUTO: 105.1 FL (ref 78–100)
MONOCYTES ABSOLUTE: 1.5 K/CU MM
MONOCYTES RELATIVE PERCENT: 19.7 % (ref 0–4)
PDW BLD-RTO: 17.3 % (ref 11.7–14.9)
PLATELET # BLD: 194 K/CU MM (ref 140–440)
PMV BLD AUTO: 10.3 FL (ref 7.5–11.1)
POTASSIUM SERPL-SCNC: 3.4 MMOL/L (ref 3.5–5.1)
RBC # BLD: 3.33 M/CU MM (ref 4.2–5.4)
SEGMENTED NEUTROPHILS ABSOLUTE COUNT: 5.1 K/CU MM
SEGMENTED NEUTROPHILS RELATIVE PERCENT: 65.7 % (ref 36–66)
SODIUM BLD-SCNC: 139 MMOL/L (ref 135–145)
TOTAL PROTEIN: 4.9 GM/DL (ref 6.4–8.2)
WBC # BLD: 7.7 K/CU MM (ref 4–10.5)

## 2020-02-24 PROCEDURE — 80053 COMPREHEN METABOLIC PANEL: CPT

## 2020-02-24 PROCEDURE — 36591 DRAW BLOOD OFF VENOUS DEVICE: CPT

## 2020-02-24 PROCEDURE — 36415 COLL VENOUS BLD VENIPUNCTURE: CPT

## 2020-02-24 PROCEDURE — 6360000002 HC RX W HCPCS: Performed by: INTERNAL MEDICINE

## 2020-02-24 PROCEDURE — 85025 COMPLETE CBC W/AUTO DIFF WBC: CPT

## 2020-02-24 PROCEDURE — 96402 CHEMO HORMON ANTINEOPL SQ/IM: CPT

## 2020-02-24 PROCEDURE — 6360000002 HC RX W HCPCS

## 2020-02-24 RX ORDER — HEPARIN SODIUM (PORCINE) LOCK FLUSH IV SOLN 100 UNIT/ML 100 UNIT/ML
SOLUTION INTRAVENOUS
Status: DISPENSED
Start: 2020-02-24 | End: 2020-02-24

## 2020-02-24 RX ORDER — LAMOTRIGINE 25 MG/1
500 TABLET ORAL ONCE
Status: DISCONTINUED | OUTPATIENT
Start: 2020-02-24 | End: 2020-02-25 | Stop reason: HOSPADM

## 2020-02-28 ENCOUNTER — HOSPITAL ENCOUNTER (OUTPATIENT)
Dept: WOUND CARE | Age: 70
Discharge: HOME | End: 2020-02-28
Payer: MEDICARE

## 2020-02-28 VITALS
HEART RATE: 91 BPM | TEMPERATURE: 99 F | DIASTOLIC BLOOD PRESSURE: 75 MMHG | RESPIRATION RATE: 16 BRPM | SYSTOLIC BLOOD PRESSURE: 99 MMHG

## 2020-02-28 PROCEDURE — 15271 SKIN SUB GRAFT TRNK/ARM/LEG: CPT

## 2020-02-28 RX ORDER — ATENOLOL 25 MG/1
25 TABLET ORAL DAILY
COMMUNITY

## 2020-02-28 NOTE — PROGRESS NOTES
Primary          Conditions above and those listed in the past history are being treated appropriately and are considered under adequate control so as not to preclude the use of a graft. SKIN SUBSTITUTES/GRAFT APPLICATIONS PROCEDURE NOTE    Indicaton:     Chronic ulcer(s) of the buttock/sacral region  that has failed to heal with the usual wound protocol used for greater than 30 days. See Epic chart for previous modalities and details of previous treatment. The patient has no contraindications or evidence of infection. The patient's competency and support system is appropriate for proper care and follow up for the use of this graft, therefore a graft was placed as below. Procedure: The wound bed was cleansed and prepared as necessary to accept the graft. Debridement was required. Consent obtained: Yes    Time out taken: Yes    Using curette sharp Excisional Debridement of the wound(s) was/were performed down through and including the removal of epidermis, dermis and subcutaneous tissue. Devitalized Tissue Debrided:  fibrin, biofilm, slough and exudate      Bleeding:  Minimal    Hemostasis Achieved:  by pressure    Procedural Pain:  3  / 10     Post Procedural Pain:  1 / 10     Having prepared the wound bed, the skin graft was completed as below.     Product Utilized:          [] APLIGRAF   []44 sq cm   []88 sq cm    []132 sq cm  []176 sq cm           [] DERMAGRAFT  [] 38 sq cm   []76 sq cm    []114 sq cm  []152 sq cm      [] NUSHIELD  [] 1.6 sq/cm disc   [] (2X3) 6 sq/cm    [] (2X4) 8 sq/cm         [] (3X4) 12 sq/cm  [] (4x4) 16 sq/cm   [] (4X6) 24 sq/cm         [] (6X6) 36 sq/cm        [] AFFINITY    [] (1.5 cm x 1.5cm) 2.25 cm   [] (2.5 cm x 2.5 cm) 6.25 cm         [] THERASKIN  [] 13 sq cm   []37.34 sq cm             [] EpiFix   [] 1.54 sq cm disc    [] 2 pieces (3.08 sq cm)    [] 3  pieces (4.62 sq  cm)   [] 6 sq/cm    [] 16 sq cm     [] 18 sq cm   Fenestrated        [] OASIS   [] 10.5 sq cm   []21 sq cm    []35 sq cm Tri-Matrix  []70 sq cm          Tri-Matrix                    [x] PURAPLY AM  [] 1.6 sq cm disc     [x] 4 sq cm   [] 8 sq cm   [] 25sq cm  [] 54 sq cm                  [] Grafix      [] 1.54 sq cm disc    [] 3 sq cm    [] 6 sq cm   [] 12 sq cm   [] 25 sq cm        Skin Substitute Applied:    Performed by: Sarah Mata MD    Consent obtained: Yes    Time out taken: Yes     Fenestrated: No    Skin Substitute was Applied to Wound Number(s):     1      Negative Pressure Wound Therapy (Active)   Unit Type KCI 2/28/2020  4:11 PM   Dressing Type Black foam 2/28/2020  4:11 PM   Number of pieces used 1 2/28/2020  4:11 PM   Cycle Continuous 2/28/2020  4:11 PM   Target Pressure (mmHg) 125 2/28/2020  4:11 PM   Intensity 5 1/31/2020  4:45 PM   Canister changed? Yes 2/28/2020  4:11 PM   Dressing Status Clean;Dry; Intact 2/28/2020  4:11 PM   Dressing Changed Changed/New 2/28/2020  4:11 PM   Drainage Amount Moderate 1/31/2020  4:45 PM   Drainage Description Serosanguinous 1/31/2020  4:45 PM   Wound Assessment Red 2/28/2020  4:11 PM   Jackelin-wound Assessment Intact 2/28/2020  4:11 PM   Odor Mild 1/31/2020  4:45 PM   Number of days: 35       Wound 11/04/19 Buttocks Left;Upper (Active)   Number of days: 116       Wound 01/03/20 #1 Left proximal Buttock (Active)   Wound Image   2/14/2020  2:19 PM   Wound Pressure Stage  2 2/21/2020  2:41 PM   Dressing Status Clean;Dry; Intact 2/21/2020  3:49 PM   Dressing Changed Changed/New 2/21/2020  3:49 PM   Wound Cleansed Rinsed/Irrigated with saline 2/28/2020  2:26 PM   Wound Length (cm) 1.8 cm 2/28/2020  2:26 PM   Wound Width (cm) 1 cm 2/28/2020  2:26 PM   Wound Depth (cm) 2 cm 2/28/2020  2:26 PM   Wound Surface Area (cm^2) 1.8 cm^2 2/28/2020  2:26 PM   Change in Wound Size % (l*w) -114.29 2/28/2020  2:26 PM   Wound Volume (cm^3) 3.6 cm^3 2/28/2020  2:26 PM   Wound Healing % -125 2/28/2020  2:26 PM   Post-Procedure Length (cm) 1.8 cm 2/28/2020  2:30 PM Post-Procedure Width (cm) 1 cm 2/28/2020  2:30 PM   Post-Procedure Depth (cm) 2 cm 2/28/2020  2:30 PM   Post-Procedure Surface Area (cm^2) 1.8 cm^2 2/28/2020  2:30 PM   Post-Procedure Volume (cm^3) 3.6 cm^3 2/28/2020  2:30 PM   Distance Tunneling (cm) 0 cm 2/28/2020  2:26 PM   Tunneling Position ___ O'Clock 0 2/28/2020  2:26 PM   Undermining Starts ___ O'Clock 1300 2/28/2020  2:26 PM   Undermining Ends___ O'Clock 1700 2/28/2020  2:26 PM   Undermining Maxium Distance (cm) 0.5 2/28/2020  2:26 PM   Wound Assessment Red;Yellow 2/28/2020  2:26 PM   Drainage Amount Large 2/28/2020  2:26 PM   Drainage Description Serosanguinous 2/28/2020  2:26 PM   Odor None 2/28/2020  2:26 PM   Margins Defined edges 2/28/2020  2:26 PM   Jackelin-wound Assessment Intact 2/28/2020  2:26 PM   Non-staged Wound Description Full thickness 2/28/2020  2:26 PM   [place]%Wound Bed 0 2/28/2020  2:26 PM   Red%Wound Bed 60 2/28/2020  2:26 PM   Yellow%Wound Bed 40 2/28/2020  2:26 PM   Black%Wound Bed 0 2/28/2020  2:26 PM   Purple%Wound Bed 0 2/28/2020  2:26 PM   Other%Wound Bed 0 2/28/2020  2:26 PM   Number of days: 56       Wound 01/03/20 #3 Right Buttock (Active)   Wound Image   2/14/2020  2:19 PM   Wound Pressure Stage  2 2/21/2020  2:41 PM   Dressing Status Clean;Dry; Intact 2/28/2020  4:11 PM   Dressing Changed Changed/New 2/28/2020  4:11 PM   Wound Cleansed Rinsed/Irrigated with saline 2/28/2020  2:26 PM   Wound Length (cm) 2.8 cm 2/28/2020  2:26 PM   Wound Width (cm) 0.8 cm 2/28/2020  2:26 PM   Wound Depth (cm) 0.1 cm 2/28/2020  2:26 PM   Wound Surface Area (cm^2) 2.24 cm^2 2/28/2020  2:26 PM   Change in Wound Size % (l*w) 72.85 2/28/2020  2:26 PM   Wound Volume (cm^3) 0.22 cm^3 2/28/2020  2:26 PM   Wound Healing % 73 2/28/2020  2:26 PM   Distance Tunneling (cm) 0 cm 2/28/2020  2:26 PM   Tunneling Position ___ O'Clock 0 2/28/2020  2:26 PM   Undermining Starts ___ O'Clock 0 2/28/2020  2:26 PM   Undermining Ends___ O'Clock 0 2/28/2020  2:26 PM Red%Wound Bed 0 2/28/2020  2:26 PM   Yellow%Wound Bed 100 2/28/2020  2:26 PM   Black%Wound Bed 0 2/28/2020  2:26 PM   Purple%Wound Bed 0 2/28/2020  2:26 PM   Other%Wound Bed 0 2/28/2020  2:26 PM   Number of days: 28       Wound 01/31/20 Pretibial Left;Proximal #7 left medial leg cluster  (Active)   Wound Image   2/14/2020  2:19 PM   Dressing Status Clean;Dry; Intact 2/28/2020  4:11 PM   Dressing Changed Changed/New 2/28/2020  4:11 PM   Wound Cleansed Rinsed/Irrigated with saline 2/28/2020  2:26 PM   Wound Length (cm) 1 cm 2/28/2020  2:26 PM   Wound Width (cm) 0.9 cm 2/28/2020  2:26 PM   Wound Depth (cm) 0.1 cm 2/28/2020  2:26 PM   Wound Surface Area (cm^2) 0.9 cm^2 2/28/2020  2:26 PM   Change in Wound Size % (l*w) -157.14 2/28/2020  2:26 PM   Wound Volume (cm^3) 0.09 cm^3 2/28/2020  2:26 PM   Wound Healing % -125 2/28/2020  2:26 PM   Distance Tunneling (cm) 0 cm 2/28/2020  2:26 PM   Tunneling Position ___ O'Clock 0 2/28/2020  2:26 PM   Undermining Starts ___ O'Clock 0 2/28/2020  2:26 PM   Undermining Ends___ O'Clock 0 2/28/2020  2:26 PM   Undermining Maxium Distance (cm) 0 2/28/2020  2:26 PM   Wound Assessment Pink 2/28/2020  2:26 PM   Drainage Amount Moderate 2/28/2020  2:26 PM   Drainage Description Yellow 2/28/2020  2:26 PM   Odor None 2/28/2020  2:26 PM   Margins Defined edges 2/28/2020  2:26 PM   Jackelin-wound Assessment Pink 2/28/2020  2:26 PM   Non-staged Wound Description Full thickness 2/28/2020  2:26 PM   [place]%Wound Bed 100 2/28/2020  2:26 PM   Red%Wound Bed 0 2/28/2020  2:26 PM   Yellow%Wound Bed 0 2/28/2020  2:26 PM   Black%Wound Bed 0 2/28/2020  2:26 PM   Purple%Wound Bed 0 2/28/2020  2:26 PM   Other%Wound Bed 0 2/28/2020  2:26 PM   Number of days: 28       Wound 02/07/20 Right;Posterior #8 right posterior prox lower leg  (Active)   Wound Image   2/14/2020  2:19 PM   Dressing Status Clean;Dry; Intact 2/21/2020  3:49 PM   Dressing Changed Changed/New 2/21/2020  3:49 PM   Wound Cleansed Rinsed/Irrigated with saline 2/28/2020  2:26 PM   Wound Length (cm) 0 cm 2/28/2020  2:26 PM   Wound Width (cm) 0 cm 2/28/2020  2:26 PM   Wound Depth (cm) 0 cm 2/28/2020  2:26 PM   Wound Surface Area (cm^2) 0 cm^2 2/28/2020  2:26 PM   Change in Wound Size % (l*w) 100 2/28/2020  2:26 PM   Wound Volume (cm^3) 0 cm^3 2/28/2020  2:26 PM   Wound Healing % 100 2/28/2020  2:26 PM   Distance Tunneling (cm) 0 cm 2/28/2020  2:26 PM   Tunneling Position ___ O'Clock 0 2/28/2020  2:26 PM   Undermining Starts ___ O'Clock 0 2/28/2020  2:26 PM   Undermining Ends___ O'Clock 0 2/28/2020  2:26 PM   Undermining Maxium Distance (cm) 0 2/28/2020  2:26 PM   Wound Assessment Pink 2/28/2020  2:26 PM   Drainage Amount None 2/28/2020  2:26 PM   Drainage Description Green;Yellow 2/14/2020  2:19 PM   Odor None 2/28/2020  2:26 PM   Margins Attached edges 2/28/2020  2:26 PM   Jackelin-wound Assessment Clean;Dry; Intact 2/28/2020  2:26 PM   Non-staged Wound Description Not applicable 1/01/1564  1:74 PM   [place]%Wound Bed 100 2/28/2020  2:26 PM   Red%Wound Bed 0 2/28/2020  2:26 PM   Yellow%Wound Bed 0 2/28/2020  2:26 PM   Black%Wound Bed 0 2/28/2020  2:26 PM   Purple%Wound Bed 0 2/28/2020  2:26 PM   Other%Wound Bed 0 2/28/2020  2:26 PM   Number of days: 21       Wound 02/07/20 Right;Posterior #9 right posterior distal lower leg  (Active)   Wound Image   2/14/2020  2:19 PM   Dressing Status Clean;Dry; Intact 2/28/2020  4:11 PM   Dressing Changed Changed/New 2/28/2020  4:11 PM   Wound Cleansed Rinsed/Irrigated with saline 2/28/2020  2:26 PM   Wound Length (cm) 3.6 cm 2/28/2020  2:26 PM   Wound Width (cm) 5 cm 2/28/2020  2:26 PM   Wound Depth (cm) 0.1 cm 2/28/2020  2:26 PM   Wound Surface Area (cm^2) 18 cm^2 2/28/2020  2:26 PM   Change in Wound Size % (l*w) -765.38 2/28/2020  2:26 PM   Wound Volume (cm^3) 1.8 cm^3 2/28/2020  2:26 PM   Wound Healing % -757 2/28/2020  2:26 PM   Distance Tunneling (cm) 0 cm 2/28/2020  2:26 PM   Tunneling Position ___ O'Clock 0 2/28/2020  2:26 PM Undermining Starts ___ O'Clock 0 2/28/2020  2:26 PM   Undermining Ends___ O'Clock 0 2/28/2020  2:26 PM   Undermining Maxium Distance (cm) 0 2/28/2020  2:26 PM   Wound Assessment Pink;Yellow 2/28/2020  2:26 PM   Drainage Amount Large 2/28/2020  2:26 PM   Drainage Description Yellow 2/28/2020  2:26 PM   Odor None 2/28/2020  2:26 PM   Margins Defined edges 2/28/2020  2:26 PM   Jackelin-wound Assessment Pink 2/28/2020  2:26 PM   Non-staged Wound Description Full thickness 2/28/2020  2:26 PM   [place]%Wound Bed 85 2/28/2020  2:26 PM   Red%Wound Bed 0 2/28/2020  2:26 PM   Yellow%Wound Bed 15 2/28/2020  2:26 PM   Black%Wound Bed 0 2/28/2020  2:26 PM   Purple%Wound Bed 0 2/28/2020  2:26 PM   Other%Wound Bed 0 2/28/2020  2:26 PM   Number of days: 21       Wound 02/14/20 Tibial Left;Proximal;Posterior #10 (Active)   Wound Image   2/14/2020  2:19 PM   Wound Venous 2/28/2020  2:26 PM   Dressing Status Clean;Dry; Intact 2/28/2020  4:11 PM   Dressing Changed Changed/New 2/28/2020  4:11 PM   Wound Cleansed Rinsed/Irrigated with saline 2/28/2020  2:26 PM   Wound Length (cm) 1.5 cm 2/28/2020  2:26 PM   Wound Width (cm) 0.8 cm 2/28/2020  2:26 PM   Wound Depth (cm) 0.1 cm 2/28/2020  2:26 PM   Wound Surface Area (cm^2) 1.2 cm^2 2/28/2020  2:26 PM   Change in Wound Size % (l*w) -9.09 2/28/2020  2:26 PM   Wound Volume (cm^3) 0.12 cm^3 2/28/2020  2:26 PM   Wound Healing % -9 2/28/2020  2:26 PM   Distance Tunneling (cm) 0 cm 2/28/2020  2:26 PM   Tunneling Position ___ O'Clock 0 2/28/2020  2:26 PM   Undermining Starts ___ O'Clock 0 2/28/2020  2:26 PM   Undermining Ends___ O'Clock 0 2/28/2020  2:26 PM   Undermining Maxium Distance (cm) 0 2/28/2020  2:26 PM   Wound Assessment Red;Yellow 2/28/2020  2:26 PM   Drainage Amount Moderate 2/28/2020  2:26 PM   Drainage Description Serosanguinous 2/28/2020  2:26 PM   Odor None 2/28/2020  2:26 PM   Margins Defined edges 2/28/2020  2:26 PM   Jackelin-wound Assessment Pink 2/28/2020  2:26 PM   Non-staged Wound  Private              Continue home health care with: [facility]               XRSY wound-care supplies will be provided by: [facility]               DME provider:              Compression with N/A              USU loading:  N/A              DSGFN Medications:              WTXBR cleansing:                           NI not scrub or use excessive force.                          Wash hands with soap and water before and after dressing changes.                         Prior to applying a clean dressing, cleanse wound with normal saline,                                wound cleanser, or mild soap and water.                           Ask the physician or nurse before getting the wound(s) wet in a shower              Daily Wound management:                          Keep weight off wounds and reposition every 2 hours.                          RUIZO standing for long periods of time.                          YIRJI wraps/stockings in AM and remove at bedtime.                          If swelling is present, elevate legs to the level of the heart or above for 30  minutes 4-5 times a day and/or when sitting.                                                  When taking antibiotics take entire prescription as ordered by physician do not stop taking until medicine is all gone.                                                                 Orders for this week (2/28/20):     Right great toe -- healed 2/14/2020     Left forearm-- wash with soap and water, pat dry. Cover with kerramax and TUBI D change daily.      Right thigh-- Cover open areas with kerramax then :Wrap with conform and tape.  Wrap with ace wrap.              Left lower legs--   Wash with mild soap and water. Lotrisone to entire leg.  Cover open areas with kerramax then :Wrap with conform and tape. Tubi E and wrap with ace wrap.              Buttock cluster- Wash with mild soap and water. Paint with betadine, cover with island border gauze. Change Daily.    Left buttock

## 2020-03-06 ENCOUNTER — HOSPITAL ENCOUNTER (OUTPATIENT)
Dept: WOUND CARE | Age: 70
Discharge: HOME | End: 2020-03-06
Payer: MEDICARE

## 2020-03-06 VITALS
RESPIRATION RATE: 17 BRPM | SYSTOLIC BLOOD PRESSURE: 110 MMHG | DIASTOLIC BLOOD PRESSURE: 55 MMHG | HEART RATE: 96 BPM | TEMPERATURE: 97.7 F

## 2020-03-06 PROCEDURE — 15271 SKIN SUB GRAFT TRNK/ARM/LEG: CPT

## 2020-03-06 NOTE — PROGRESS NOTES
PuraPly AMTreatment Note    NAME:  Mariely Parent OF BIRTH:  1950  MEDICAL RECORD NUMBER:  [id number]  DATE:  3/6/2020    Goal:  Patient will receive safe and proper application of skin substitute. Patient will comply with caring for dressing, and reporting complications. Expiration date checked immediately prior to use. Package intact prior to use and no damage noted. Transport temperature controlled and acceptable. PuraPly AM was removed from protective sterile packaging by provider and applied to prepared ulcer bed. PuraPly AM was hydrated with sterile normal saline per provider. PuraPly AM was applied to left buttock and affixed with steri-strips by the provider. PuraPly AM was covered with non-adherent ulcer dressing. Applied sorbact over non-adherent. Patient/caregiver was instructed not to remove dressing and to keep it clean and dry. Pt/family/caregiver was instructed on signs and symptoms of complications to report such as draining through dressing, dressing falling down/slipping, getting wet, or severe pain or tingling. PuraPly AM may be applied a total of 10 times per wound over a 12 week period. Date of first application of PuraPly for this current wound is February 28, 2020 .     Guidelines followed    Electronically signed by Heyward Phoenix, RN on 3/6/2020 at 4:32 PM

## 2020-03-06 NOTE — PROGRESS NOTES
HISTORY    Past Surgical History:   Procedure Laterality Date    ABDOMEN SURGERY      \"in 1974 did exploratory surgery- found I had cyst on right ovary that ruptured and said I was bleeding internally\"/ then back age 2 ( 6) did exploratory surgery for hilario colon \"    APPENDECTOMY  age 3    BONE BIOPSY N/A 03/14/2019    IR    BREAST SURGERY Left 12/2014    Masectomy    COLONOSCOPY  03/2006    Normal exam    COLONOSCOPY  10/04/2017    mild sigmoid divertics    HEMIARTHROPLASTY HIP Left 9/28/2019    HIP HEMIARTHROPLASTY performed by Parish Kurtz MD at [facility] Right 11/5/2019    HIP HEMIARTHROPLASTY performed by Parish Kurtz MD at [facility]      Removed 7/2016( inserted in 2015)       FAMILY HISTORY    Family History   Problem Relation Age of Onset    High Blood Pressure Mother     Arthritis Mother     Diabetes Father     Asthma Father         COPD    Heart Disease Father        SOCIAL HISTORY    Social History     Tobacco Use    Smoking status: Never Smoker    Smokeless tobacco: Never Used   Substance Use Topics    Alcohol use: Not Currently     Comment: per pt on 3/13/2019\"use to drink average one glass per week- none recently\"    Drug use: No       ALLERGIES    Allergies   Allergen Reactions    Sulfa Antibiotics Other (See Comments)     As child unsure of the reaction       MEDICATIONS    Current Outpatient Medications on File Prior to Encounter   Medication Sig Dispense Refill    atenolol (TENORMIN) 25 MG tablet Take 25 mg by mouth daily      morphine (MSIR) 15 MG tablet Take 15 mg by mouth 2 times daily.  HYDROcodone-acetaminophen (NORCO) 7.5-325 MG per tablet Take 1 tablet by mouth every 6 hours as needed for Pain.       furosemide (LASIX) 20 MG tablet Take 1 tablet by mouth daily As needed for swelling 60 tablet 3    potassium chloride (KLOR-CON M) 20 MEQ TBCR extended release tablet Take 20 mEq by mouth 2 times daily      Alpelisib, 200 MG Daily Dose, (PIQRAY, 200 MG DAILY DOSE,) 200 MG TBPK Take 150 mg by mouth daily (with breakfast)       OLANZapine (ZYPREXA) 2.5 MG tablet Take 2.5 mg by mouth nightly Takes during Chemo      folic acid (FOLVITE) 1 MG tablet Take 800 mcg by mouth daily      dexamethasone (DECADRON) 4 MG tablet Take 0.5 mg by mouth every other day       Mastectomy Bra MISC by Does not apply route 4 each 0    Breast Prosthesis MISC by Does not apply route 1 each 1    ondansetron (ZOFRAN) 8 MG tablet Take 8 mg by mouth every 8 hours as needed for Nausea or Vomiting      vitamin C (ASCORBIC ACID) 500 MG tablet Take 500 mg by mouth daily      acetaminophen (TYLENOL) 325 MG tablet Take 650 mg by mouth every 6 hours as needed for Pain      omeprazole (PRILOSEC) 20 MG delayed release capsule Take 20 mg by mouth daily       Multiple Vitamin (MULTI VITAMIN DAILY PO) Take by mouth      vitamin E 400 UNIT capsule Take 400 Units by mouth daily      aspirin 81 MG tablet Take 81 mg by mouth every other day       Loratadine 10 MG CAPS Take by mouth daily        No current facility-administered medications on file prior to encounter. REVIEW OF SYSTEMS    Pertinent items are noted in HPI. Constitutional: Negative for systemic symptoms including fever, chills and malaise. Objective:      BP (!) 110/55   Pulse 96   Temp 97.7 °F (36.5 °C) (Temporal)   Resp 17   LMP 01/13/2005     PHYSICAL EXAM      General: The patient is in no acute distress. Mental status:  Patient is appropriate, is  oriented to place and plan of care.   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 1.1D  Expiration 6/4/22  Performed by: Zechariah Lloyd MD    Consent obtained: Yes    Time out taken: Yes     Fenestrated: No  Reconstited- with normal saline  Skin Substitute was Applied to Wound Number(s):     1      Negative Pressure Wound Therapy (Active)   Unit Type KCI 2/28/2020  4:11 PM   Dressing Type Black foam 2/28/2020  4:11 PM   Number of pieces used 1 2/28/2020  4:11 PM   Cycle Continuous 2/28/2020  4:11 PM   Target Pressure (mmHg) 75 2/28/2020  4:11 PM   Intensity 5 1/31/2020  4:45 PM   Canister changed? Yes 2/28/2020  4:11 PM   Dressing Status Clean;Dry; Intact 2/28/2020  4:11 PM   Dressing Changed Changed/New 2/28/2020  4:11 PM   Drainage Amount Moderate 1/31/2020  4:45 PM   Drainage Description Serosanguinous 1/31/2020  4:45 PM   Wound Assessment Red 2/28/2020  4:11 PM   Jackelin-wound Assessment Intact 2/28/2020  4:11 PM   Odor Mild 1/31/2020  4:45 PM   Number of days: 42       Wound 11/04/19 Buttocks Left;Upper (Active)   Number of days: 123       Wound 01/03/20 #1 Left proximal Buttock (Active)   Wound Image   3/6/2020  1:46 PM   Wound Pressure Stage  2 3/6/2020  1:46 PM   Dressing Status Clean;Dry; Intact 2/28/2020  4:11 PM   Dressing Changed Changed/New 2/28/2020  4:11 PM   Wound Cleansed Wound cleanser 3/6/2020  1:46 PM   Wound Length (cm) 1.2 cm 3/6/2020  1:46 PM   Wound Width (cm) 0.5 cm 3/6/2020  1:46 PM   Wound Depth (cm) 1.4 cm 3/6/2020  1:46 PM   Wound Surface Area (cm^2) 0.6 cm^2 3/6/2020  1:46 PM   Change in Wound Size % (l*w) 28.57 3/6/2020  1:46 PM   Wound Volume (cm^3) 0.84 cm^3 3/6/2020  1:46 PM   Wound Healing % 48 3/6/2020  1:46 PM   Post-Procedure Length (cm) 1.2 cm 3/6/2020  2:19 PM   Post-Procedure Width (cm) 0.5 cm 3/6/2020  2:19 PM   Post-Procedure Depth (cm) 1.4 cm 3/6/2020  2:19 PM   Post-Procedure Surface Area (cm^2) 0.6 cm^2 3/6/2020  2:19 PM   Post-Procedure Volume (cm^3) 0.84 cm^3 3/6/2020  2:19 PM   Distance Tunneling (cm) 0 cm 3/6/2020  1:46 PM   Tunneling Position ___ O'Clock 0 3/6/2020  1:46 PM   Undermining Starts ___ O'Clock 1200 3/6/2020  1:46 PM   Undermining Ends___ O'Clock 1500 3/6/2020  1:46 PM   Undermining Maxium Distance (cm) 1.5 3/6/2020  1:46 PM   Wound Assessment Red;Yellow 3/6/2020  1:46 PM   Drainage Amount Large 3/6/2020  1:46 PM   Drainage Description Serosanguinous 3/6/2020  1:46 PM   Odor None 3/6/2020  1:46 PM   Margins Defined edges 3/6/2020  1:46 PM   Jackelin-wound Assessment Intact 3/6/2020  1:46 PM   Non-staged Wound Description Full thickness 3/6/2020  1:46 PM   [place]%Wound Bed 0 3/6/2020  1:46 PM   Red%Wound Bed 60 3/6/2020  1:46 PM   Yellow%Wound Bed 40 3/6/2020  1:46 PM   Black%Wound Bed 0 3/6/2020  1:46 PM   Purple%Wound Bed 0 3/6/2020  1:46 PM   Other%Wound Bed 0 3/6/2020  1:46 PM   Number of days: 63       Wound 01/03/20 #3 Right Buttock (Active)   Wound Image   3/6/2020  1:46 PM   Wound Pressure Stage  2 3/6/2020  1:46 PM   Dressing Status Clean;Dry; Intact 2/28/2020  4:11 PM   Dressing Changed Changed/New 2/28/2020  4:11 PM   Wound Cleansed Wound cleanser 3/6/2020  1:46 PM   Wound Length (cm) 1.3 cm 3/6/2020  1:46 PM   Wound Width (cm) 0.3 cm 3/6/2020  1:46 PM   Wound Depth (cm) 0.1 cm 3/6/2020  1:46 PM   Wound Surface Area (cm^2) 0.39 cm^2 3/6/2020  1:46 PM   Change in Wound Size % (l*w) 95.27 3/6/2020  1:46 PM   Wound Volume (cm^3) 0.04 cm^3 3/6/2020  1:46 PM   Wound Healing % 95 3/6/2020  1:46 PM   Distance Tunneling (cm) 0 cm 3/6/2020  1:46 PM   Tunneling Position ___ O'Clock 0 3/6/2020  1:46 PM   Undermining Starts ___ O'Clock 0 3/6/2020  1:46 PM   Undermining Ends___ O'Clock 0 3/6/2020  1:46 PM   Undermining Maxium Distance (cm) 0 3/6/2020  1:46 PM   Wound Assessment Pink;Yellow 3/6/2020  1:46 PM   Drainage Amount Moderate 3/6/2020  1:46 PM   Drainage Description Serosanguinous 3/6/2020  1:46 PM   Odor None 3/6/2020  1:46 PM   Margins Defined edges 3/6/2020  1:46 PM   Jackelin-wound Assessment Red 3/6/2020  1:46 PM   Non-staged Wound Description Full thickness 3/6/2020  1:46 PM   [place]%Wound Bed 10 3/6/2020  1:46 PM   Red%Wound Bed 0 3/6/2020  1:46 PM   Yellow%Wound Bed 90 3/6/2020  1:46 PM   Black%Wound Bed 0 3/6/2020  1:46 PM   Purple%Wound Bed 0 3/6/2020  1:46 PM   Other%Wound Bed 0 3/6/2020  1:46 PM   Number of days: 63       Wound 01/31/20 Ankle Right;Posterior #6 right posterior ankle  (Active)   Wound Image   3/6/2020  1:46 PM   Dressing Status Clean;Dry; Intact 2/28/2020  4:11 PM   Dressing Changed Changed/New 2/28/2020  4:11 PM   Wound Cleansed Wound cleanser 3/6/2020  1:46 PM   Wound Length (cm) 0.2 cm 3/6/2020  1:46 PM   Wound Width (cm) 0.4 cm 3/6/2020  1:46 PM   Wound Depth (cm) 0.1 cm 3/6/2020  1:46 PM   Wound Surface Area (cm^2) 0.08 cm^2 3/6/2020  1:46 PM   Change in Wound Size % (l*w) 77.78 3/6/2020  1:46 PM   Wound Volume (cm^3) 0.01 cm^3 3/6/2020  1:46 PM   Wound Healing % 75 3/6/2020  1:46 PM   Distance Tunneling (cm) 0 cm 3/6/2020  1:46 PM   Tunneling Position ___ O'Clock 0 3/6/2020  1:46 PM   Undermining Starts ___ O'Clock 0 3/6/2020  1:46 PM   Undermining Ends___ O'Clock 0 3/6/2020  1:46 PM   Undermining Maxium Distance (cm) 0 3/6/2020  1:46 PM   Wound Assessment Yellow 3/6/2020  1:46 PM   Drainage Amount Moderate 3/6/2020  1:46 PM   Drainage Description Yellow 3/6/2020  1:46 PM   Odor None 3/6/2020  1:46 PM   Margins Defined edges 3/6/2020  1:46 PM   Jackelin-wound Assessment Pink 3/6/2020  1:46 PM   Non-staged Wound Description Full thickness 3/6/2020  1:46 PM   Wound measurement (cm^2) 0 cm2 2/14/2020  2:19 PM   [place]%Wound Bed 0 3/6/2020  1:46 PM   Red%Wound Bed 0 3/6/2020  1:46 PM   Yellow%Wound Bed 100 3/6/2020  1:46 PM   Black%Wound Bed 0 3/6/2020  1:46 PM   Purple%Wound Bed 0 3/6/2020  1:46 PM   Other%Wound Bed 0 3/6/2020  1:46 PM   Number of days: 34       Wound 01/31/20 Pretibial Left;Proximal #7 left medial leg cluster  (Active)   Wound Image   3/6/2020  1:46 PM   Dressing Status Clean;Dry; Intact 2/28/2020  4:11 PM   Dressing Changed Changed/New 2/28/2020  4:11 PM   Wound Cleansed Wound cleanser 3/6/2020  1:46 PM   Wound Length (cm) 0.9 cm 3/6/2020  1:46 PM   Wound Width (cm) 0.8 cm 3/6/2020  1:46 PM   Wound Depth (cm) 0.1 cm 3/6/2020  1:46 PM   Wound Surface Area (cm^2) 0.72 cm^2 3/6/2020  1:46 PM   Change in Wound Size % (l*w) -105.71 3/6/2020  1:46 PM   Wound Volume (cm^3) 0.07 cm^3 3/6/2020  1:46 PM   Wound Healing % -75 3/6/2020  1:46 PM   Distance Tunneling (cm) 0 cm 3/6/2020  1:46 PM   Tunneling Position ___ O'Clock 0 3/6/2020  1:46 PM   Undermining Starts ___ O'Clock 0 3/6/2020  1:46 PM   Undermining Ends___ O'Clock 0 3/6/2020  1:46 PM   Undermining Maxium Distance (cm) 0 3/6/2020  1:46 PM   Wound Assessment Pink;Yellow 3/6/2020  1:46 PM   Drainage Amount Moderate 3/6/2020  1:46 PM   Drainage Description Yellow 3/6/2020  1:46 PM   Odor None 3/6/2020  1:46 PM   Margins Defined edges 3/6/2020  1:46 PM   Jackelin-wound Assessment Pink 3/6/2020  1:46 PM   Non-staged Wound Description Full thickness 3/6/2020  1:46 PM   [place]%Wound Bed 50 3/6/2020  1:46 PM   Red%Wound Bed 0 3/6/2020  1:46 PM   Yellow%Wound Bed 50 3/6/2020  1:46 PM   Black%Wound Bed 0 3/6/2020  1:46 PM   Purple%Wound Bed 0 3/6/2020  1:46 PM   Other%Wound Bed 0 3/6/2020  1:46 PM   Number of days: 34       Wound 02/07/20 Right;Posterior #9 right posterior distal lower leg  (Active)   Wound Image   3/6/2020  1:46 PM   Dressing Status Clean;Dry; Intact 2/28/2020  4:11 PM   Dressing Changed Changed/New 2/28/2020  4:11 PM   Wound

## 2020-03-13 ENCOUNTER — HOSPITAL ENCOUNTER (OUTPATIENT)
Dept: WOUND CARE | Age: 70
Discharge: HOME | End: 2020-03-13
Payer: MEDICARE

## 2020-03-13 VITALS
SYSTOLIC BLOOD PRESSURE: 116 MMHG | RESPIRATION RATE: 16 BRPM | TEMPERATURE: 97.3 F | DIASTOLIC BLOOD PRESSURE: 63 MMHG | HEART RATE: 100 BPM

## 2020-03-13 PROCEDURE — 15275 SKIN SUB GRAFT FACE/NK/HF/G: CPT

## 2020-03-13 PROCEDURE — 15271 SKIN SUB GRAFT TRNK/ARM/LEG: CPT | Performed by: NURSE PRACTITIONER

## 2020-03-13 NOTE — PLAN OF CARE
Problem: Wound:  Goal: Will show signs of wound healing; wound closure and no evidence of infection  Description: Will show signs of wound healing; wound closure and no evidence of infection  Outcome: Ongoing  Note: See FLowsheet     Problem: Wound:  Intervention: Assess ankle, calf, or foot circumference blilaterally  Note: See FLowsheet  Intervention: Assess pain status  Note: See FLowsheet  Intervention: Assess wound size, appearance and drainage  Note: See FLowsheet  Intervention: Assess pedal pulses bilaterally if patient has a foot or leg ulcer  Note: See FLowsheet  Intervention: Doppler if unable to palpate pedal pulse  Note: See FLowsheet

## 2020-03-13 NOTE — PROGRESS NOTES
OLANZapine (ZYPREXA) 2.5 MG tablet Take 2.5 mg by mouth nightly Takes during Chemo      folic acid (FOLVITE) 1 MG tablet Take 800 mcg by mouth daily      vitamin C (ASCORBIC ACID) 500 MG tablet Take 500 mg by mouth daily      omeprazole (PRILOSEC) 20 MG delayed release capsule Take 20 mg by mouth daily       Multiple Vitamin (MULTI VITAMIN DAILY PO) Take by mouth      vitamin E 400 UNIT capsule Take 400 Units by mouth daily      aspirin 81 MG tablet Take 81 mg by mouth every other day       Loratadine 10 MG CAPS Take by mouth daily       morphine (MSIR) 15 MG tablet Take 15 mg by mouth 2 times daily.  furosemide (LASIX) 20 MG tablet Take 1 tablet by mouth daily As needed for swelling 60 tablet 3    dexamethasone (DECADRON) 4 MG tablet Take 0.5 mg by mouth every other day       Mastectomy Bra MISC by Does not apply route 4 each 0    Breast Prosthesis MISC by Does not apply route 1 each 1    ondansetron (ZOFRAN) 8 MG tablet Take 8 mg by mouth every 8 hours as needed for Nausea or Vomiting      acetaminophen (TYLENOL) 325 MG tablet Take 650 mg by mouth every 6 hours as needed for Pain       No current facility-administered medications on file prior to encounter. REVIEW OF SYSTEMS    Pertinent items are noted in HPI. Constitutional: Negative for systemic symptoms including fever, chills and malaise. Objective:      /63   Pulse 100   Temp 97.3 °F (36.3 °C) (Temporal)   Resp 16   LMP 01/13/2005     PHYSICAL EXAM  General: The patient is in no acute distress. Mental status:  Patient is appropriate, is  oriented to place and plan of care.   Dermatologic exam: Visual inspection of the periwound reveals the skin to be normal in turgor and texture  Wound exam: see wound description below in procedure note      Assessment:     Problem List Items Addressed This Visit     WD-Stage IV pressure ulcer of left buttock (HCC)    Pressure ulcer of contiguous site of back, buttock and hip, stage Tunneling Position ___ O'Clock 0 3/13/2020  1:34 PM   Undermining Starts ___ O'Clock 0 3/13/2020  1:34 PM   Undermining Ends___ O'Clock 00 3/13/2020  1:34 PM   Undermining Maxium Distance (cm) 0 3/13/2020  1:34 PM   Wound Assessment Pink;Yellow 3/13/2020  1:34 PM   Drainage Amount Moderate 3/13/2020  1:34 PM   Drainage Description Yellow 3/13/2020  1:34 PM   Odor None 3/13/2020  1:34 PM   Margins Defined edges 3/13/2020  1:34 PM   Jackelin-wound Assessment Red 3/13/2020  1:34 PM   Non-staged Wound Description Full thickness 3/13/2020  1:34 PM   [place]%Wound Bed 10 3/13/2020  1:34 PM   Red%Wound Bed 0 3/13/2020  1:34 PM   Yellow%Wound Bed 90 3/13/2020  1:34 PM   Black%Wound Bed 0 3/13/2020  1:34 PM   Purple%Wound Bed 00 3/13/2020  1:34 PM   Other%Wound Bed 0 3/13/2020  1:34 PM   Number of days: 70       Wound 01/31/20 Ankle Right;Posterior #6 right posterior ankle  (Active)   Wound Image   3/6/2020  1:46 PM   Dressing Status Clean;Dry; Intact 3/13/2020  2:41 PM   Dressing Changed Changed/New 3/13/2020  2:41 PM   Wound Cleansed Soap and water 3/13/2020  1:20 PM   Wound Length (cm) 0.1 cm 3/13/2020  1:20 PM   Wound Width (cm) 0.2 cm 3/13/2020  1:20 PM   Wound Depth (cm) 0.1 cm 3/13/2020  1:20 PM   Wound Surface Area (cm^2) 0.02 cm^2 3/13/2020  1:20 PM   Change in Wound Size % (l*w) 94.44 3/13/2020  1:20 PM   Wound Volume (cm^3) 0 cm^3 3/13/2020  1:20 PM   Wound Healing % 100 3/13/2020  1:20 PM   Distance Tunneling (cm) 0 cm 3/13/2020  1:20 PM   Tunneling Position ___ O'Clock 0 3/13/2020  1:20 PM   Undermining Starts ___ O'Clock 0 3/13/2020  1:20 PM   Undermining Ends___ O'Clock 0 3/13/2020  1:20 PM   Undermining Maxium Distance (cm) 0 3/13/2020  1:20 PM   Wound Assessment Yellow 3/13/2020  1:20 PM   Drainage Amount Moderate 3/13/2020  1:20 PM   Drainage Description Yellow 3/13/2020  1:20 PM   Odor None 3/13/2020  1:20 PM   Margins Defined edges 3/13/2020  1:20 PM   Jackelin-wound Assessment Pink 3/13/2020  1:20 PM   Non-staged 100 3/13/2020  1:20 PM   Distance Tunneling (cm) 0 cm 3/13/2020  1:20 PM   Tunneling Position ___ O'Clock 0 3/13/2020  1:20 PM   Undermining Starts ___ O'Clock 00 3/13/2020  1:20 PM   Undermining Ends___ O'Clock 0 3/13/2020  1:20 PM   Undermining Maxium Distance (cm) 0 3/13/2020  1:20 PM   Wound Assessment Pink;Yellow 3/13/2020  1:20 PM   Drainage Amount Moderate 3/13/2020  1:20 PM   Drainage Description Serosanguinous 3/13/2020  1:20 PM   Odor None 3/13/2020  1:20 PM   Margins Defined edges 3/13/2020  1:20 PM   Jackelin-wound Assessment Pink 3/13/2020  1:20 PM   Non-staged Wound Description Full thickness 3/13/2020  1:20 PM   [place]%Wound Bed 50 3/13/2020  1:20 PM   Red%Wound Bed 0 3/13/2020  1:20 PM   Yellow%Wound Bed 50 3/13/2020  1:20 PM   Black%Wound Bed 0 3/13/2020  1:20 PM   Purple%Wound Bed 00 3/13/2020  1:20 PM   Other%Wound Bed 0 3/13/2020  1:20 PM   Number of days: 28         Total Surface Area Covered 4 sq/cm    Was the Product Layered  No      Amount Wasted 0 sq/cm    Reason for Waste: material provided was greater than wound size      Secured: Yes    Instruments used to complete placement of graft::curette    Secured With:   [] N/A  []Steri Strips    []Sutures     []Staples [x]Other  Wound Vac    Procedural Pain: 0/10     Post Procedural Pain: 0 / 10    Response to Treatment:  Well tolerated by patient. Status of wound progress and description from last visit: Wound is slightly better this week. I will continue with Puraply AM and wound vac a low suction. Plan:     Discharge instructions:  Discharge Instructions         PHYSICIAN ORDERS AND DISCHARGE INSTRUCTIONS     NOTE: Upon discharge from the [address], you will receive a patient experience survey.  We would be grateful if you would take the time to fill this survey out.     Wound care order history:                 TOÑO's   N/A--buttock wound              Vascular studies: done in 11/19               Imaging:               Cultures: Jie forearm-- wash with soap and water, pat dry. Cover with kerramax and TUBI D change daily.      Right thigh-- Cover open areas with kerramax then :Wrap with conform and tape.  Wrap with ace wrap.              Left lower legs--   Wash with mild soap and water. Lotrisone to entire leg.  Cover open areas with kerramax then :Wrap with conform and tape. Tubi E and wrap with ace wrap.              Buttock cluster- Wash with mild soap and water. Paint with betadine, cover with island border gauze. Change Daily. Left buttock proximal       WOUND VAC THERAPY: Puraply #3 placed , covered with sorbact tucked in to stay in place for 1 week then tuck with black foam and bridge to hip. No foam on skin , place drape over and under bridge. Cover wound with drape. Continuous suction 75 MMHG                  Lymphedema pumps referred to [facility]-- 1/31/2020-- received                       Follow up with Dr Sofia Levi. In 1 week in the wound care center  Call [phone] for any questions or concerns.   Date__________   Time____________                 Treatment Note Wound 02/14/20 Tibial Left;Proximal;Posterior #10-Dressing/Treatment: (lotrisone keramax conform tubi e )  Wound 02/07/20 Right;Posterior #9 right posterior distal lower leg -Dressing/Treatment: ( lotrisone keramax conform tubi e)  Wound 01/31/20 Ankle Right;Posterior #6 right posterior ankle -Dressing/Treatment: ( lotrisone keramax conform tubi e)  Wound 01/31/20 Pretibial Left;Proximal #7 left medial leg cluster -Dressing/Treatment: ( lotrisone keramax conform tubi e)  Wound 01/03/20 #1 Left proximal Buttock-Dressing/Treatment: (puraply;sorbact; black foam;vac drape)  Wound 01/03/20 #3 Right Buttock-Dressing/Treatment: (betadine;island border)    Written Patient Dismissal Instructions Given            Electronically signed by MELINDA Mora CNP on 3/13/2020 at 4:00 PM

## 2020-03-13 NOTE — PROGRESS NOTES
PuraPly AMTreatment Note    NAME:  Marcia Medina OF BIRTH:  1950  MEDICAL RECORD NUMBER:  [id number]  DATE:  3/13/2020    Goal:  Patient will receive safe and proper application of skin substitute. Patient will comply with caring for dressing, and reporting complications. Expiration date checked immediately prior to use. Package intact prior to use and no damage noted. Transport temperature controlled and acceptable. PuraPly AM was removed from protective sterile packaging by provider and applied to prepared ulcer bed. PuraPly AM was hydrated with sterile normal saline per provider. PuraPly AM was applied to left buttock and affixed with steri-strips by the provider. PuraPly AM was covered with non-adherent ulcer dressing. Applied steri strips over non-adherent. Applied dry gauze and/or roll gauze. Patient/caregiver was instructed not to remove dressing and to keep it clean and dry. Pt/family/caregiver was instructed on signs and symptoms of complications to report such as draining through dressing, dressing falling down/slipping, getting wet, or severe pain or tingling. PuraPly AM may be applied a total of 10 times per wound over a 12 week period. Date of first application of PuraPly for this current wound is March 13, 2020 .     Guidelines followed    Electronically signed by Tracie Garcia RN on 3/13/2020 at 5:28 PM

## 2020-03-20 ENCOUNTER — HOSPITAL ENCOUNTER (OUTPATIENT)
Dept: WOUND CARE | Age: 70
Discharge: HOME | End: 2020-03-20
Payer: MEDICARE

## 2020-03-20 VITALS
TEMPERATURE: 97.7 F | DIASTOLIC BLOOD PRESSURE: 57 MMHG | HEART RATE: 101 BPM | SYSTOLIC BLOOD PRESSURE: 117 MMHG | RESPIRATION RATE: 18 BRPM

## 2020-03-20 PROCEDURE — 11042 DBRDMT SUBQ TIS 1ST 20SQCM/<: CPT

## 2020-03-20 ASSESSMENT — PAIN SCALES - GENERAL: PAINLEVEL_OUTOF10: 0

## 2020-03-20 NOTE — PROGRESS NOTES
Wound Care Center Progress Note With Procedure    Cindy Haynes  AGE: 72 y.o. GENDER: female  : 1950  EPISODE DATE:  3/20/2020     Subjective:     Chief Complaint   Patient presents with    Wound Check     bilateral legs/buttocks          HISTORY of PRESENT ILLNESS      Cindy Haynes is a 72 y.o. female who presents today for wound evaluation of Chronic pressure and lymphedema ulcer(s) of the legs and buttocks. The ulcer is of marked severity. The underlying cause of the wound is pressure on the buttocks. Her ulcers on the legs are simply areas of drainage. She has significant edema over her whole body. He has constant leakage of fluids. Today, she notes significant problems keeping the vac sealed as the moisture builds up.     Wound Pain Timing/Severity: mild  Quality of pain: N/A  Severity of pain:  0 / 10   Modifying Factors: edema and venous stasis  Associated Signs/Symptoms: none        PAST MEDICAL HISTORY        Diagnosis Date    Anemia     Arthritis     left hip    Cancer ([address] 2014    breast cancer( left)- tx with surgery, chemo and radiation- follows with Dr Jaswinder Nuno and Dr Vinicius Duncan Diverticulosis     Edema     Esophagitis     GERD (gastroesophageal reflux disease)     Glaucoma     \"they say I have in the left eye but no treatment yet\"    Hip fracture ([address]     History of blood transfusion     \"in  after surgery    History of kidney stones     \"with the testing shows I have small kidney stones both kidneys but no trouble with them\"    Hypertension     Lymphedema     Metastatic breast cancer ([address]     Osteoarthritis     Status post bilateral total hip replacement 2020    Tachycardia     WD-Lymphedema of both lower extremities 2020    WD-Pressure injury of contiguous region involving back and right buttock, stage 2 ([address] 1/3/2020    WD-Pressure injury of left buttock, stage 3 ([address] 1/3/2020    WD-Venous stasis ulcer of right lower leg with edema of right lower leg ([facility] 1/10/2020    Wears glasses        PAST SURGICAL HISTORY    Past Surgical History:   Procedure Laterality Date    ABDOMEN SURGERY      \"in 1974 did exploratory surgery- found I had cyst on right ovary that ruptured and said I was bleeding internally\"/ then back age 2 ( 6) did exploratory surgery for hilario colon \"    APPENDECTOMY  age 2   [employer] BONE BIOPSY N/A 03/14/2019    IR    BREAST SURGERY Left 12/2014    Masectomy    COLONOSCOPY  03/2006    Normal exam    COLONOSCOPY  10/04/2017    mild sigmoid divertics    HEMIARTHROPLASTY HIP Left 9/28/2019    HIP HEMIARTHROPLASTY performed by Libby Flaherty MD at [facility] HEMIARTHROPLASTY HIP Right 11/5/2019    HIP HEMIARTHROPLASTY performed by Libby Flaherty MD at [address] [place] [address]      Removed 7/2016( inserted in 2015)       FAMILY HISTORY    Family History   Problem Relation Age of Onset    High Blood Pressure Mother     Arthritis Mother     Diabetes Father     Asthma Father         COPD    Heart Disease Father        SOCIAL HISTORY    Social History     Tobacco Use    Smoking status: Never Smoker    Smokeless tobacco: Never Used   Substance Use Topics    Alcohol use: Not Currently     Comment: per pt on 3/13/2019\"use to drink average one glass per week- none recently\"    Drug use: No       ALLERGIES    Allergies   Allergen Reactions    Sulfa Antibiotics Other (See Comments)     As child unsure of the reaction       MEDICATIONS    Current Outpatient Medications on File Prior to Encounter   Medication Sig Dispense Refill    atenolol (TENORMIN) 25 MG tablet Take 25 mg by mouth daily      morphine (MSIR) 15 MG tablet Take 15 mg by mouth 2 times daily.       furosemide (LASIX) 20 MG tablet Take 1 tablet by mouth daily As needed for swelling 60 tablet 3    potassium chloride (KLOR-CON M) 20 MEQ TBCR extended release tablet Take 20 mEq by mouth 2 times daily      Alpelisib, 200 MG Daily Dose, (PIQRAY, 200 MG DAILY DOSE,) 200 MG TBPK Take 150 mg by mouth daily (with breakfast)       OLANZapine (ZYPREXA) 2.5 MG tablet Take 2.5 mg by mouth nightly Takes during Chemo      folic acid (FOLVITE) 1 MG tablet Take 800 mcg by mouth daily      dexamethasone (DECADRON) 4 MG tablet Take 0.5 mg by mouth every other day       Mastectomy Bra MISC by Does not apply route 4 each 0    Breast Prosthesis MISC by Does not apply route 1 each 1    ondansetron (ZOFRAN) 8 MG tablet Take 8 mg by mouth every 8 hours as needed for Nausea or Vomiting      vitamin C (ASCORBIC ACID) 500 MG tablet Take 500 mg by mouth daily      omeprazole (PRILOSEC) 20 MG delayed release capsule Take 20 mg by mouth daily       Multiple Vitamin (MULTI VITAMIN DAILY PO) Take by mouth      vitamin E 400 UNIT capsule Take 400 Units by mouth daily      aspirin 81 MG tablet Take 81 mg by mouth every other day       Loratadine 10 MG CAPS Take by mouth daily       HYDROcodone-acetaminophen (NORCO) 7.5-325 MG per tablet Take 1 tablet by mouth every 6 hours as needed for Pain. No current facility-administered medications on file prior to encounter. REVIEW OF SYSTEMS    Pertinent items are noted in HPI. Constitutional: Negative for systemic symptoms including fever, chills and malaise. Objective:      BP (!) 117/57   Pulse 101   Temp 97.7 °F (36.5 °C) (Temporal)   Resp 18   LMP 01/13/2005     PHYSICAL EXAM      General: The patient is in no acute distress. Mental status:  Patient is appropriate, is  oriented to place and plan of care.   Dermatologic exam: Visual inspection of the periwound reveals the skin to be normal in turgor and texture  Wound exam: see wound description below in procedure note      Assessment:     Problem List Items Addressed This Visit     WD-Stage IV pressure ulcer of left buttock ([facility]    WD-Pressure injury of contiguous region involving back and right buttock, stage 2 (HCC)    WD-Venous stasis Dressing Changed Changed/New 3/13/2020  2:59 PM   Wound Cleansed Rinsed/Irrigated with saline 3/20/2020  1:23 PM   Wound Length (cm) 0.6 cm 3/20/2020  1:23 PM   Wound Width (cm) 0.4 cm 3/20/2020  1:23 PM   Wound Depth (cm) 0.9 cm 3/20/2020  1:23 PM   Wound Surface Area (cm^2) 0.24 cm^2 3/20/2020  1:23 PM   Change in Wound Size % (l*w) 71.43 3/20/2020  1:23 PM   Wound Volume (cm^3) 0.22 cm^3 3/20/2020  1:23 PM   Wound Healing % 86 3/20/2020  1:23 PM   Post-Procedure Length (cm) 0.6 cm 3/20/2020  1:44 PM   Post-Procedure Width (cm) 0.4 cm 3/20/2020  1:44 PM   Post-Procedure Depth (cm) 0.9 cm 3/20/2020  1:44 PM   Post-Procedure Surface Area (cm^2) 0.24 cm^2 3/20/2020  1:44 PM   Post-Procedure Volume (cm^3) 0.22 cm^3 3/20/2020  1:44 PM   Distance Tunneling (cm) 0 cm 3/20/2020  1:23 PM   Tunneling Position ___ O'Clock 0 3/20/2020  1:23 PM   Undermining Starts ___ O'Clock 1200 3/20/2020  1:23 PM   Undermining Ends___ O'Clock 1400 3/20/2020  1:23 PM   Undermining Maxium Distance (cm) 0.5 3/20/2020  1:23 PM   Wound Assessment Red;Yellow 3/20/2020  1:23 PM   Drainage Amount Large 3/20/2020  1:23 PM   Drainage Description Serosanguinous 3/20/2020  1:23 PM   Odor Mild 3/20/2020  1:23 PM   Margins Defined edges 3/20/2020  1:23 PM   [place]-wound Assessment Intact 3/20/2020  1:23 PM   Non-staged Wound Description Full thickness 3/20/2020  1:23 PM   [place]%Wound Bed 0 3/20/2020  1:23 PM   Red%Wound Bed 60 3/20/2020  1:23 PM   Yellow%Wound Bed 40 3/20/2020  1:23 PM   Black%Wound Bed 0 3/20/2020  1:23 PM   Purple%Wound Bed 0 3/20/2020  1:23 PM   Other%Wound Bed 0 3/20/2020  1:23 PM   Number of days: 77       Wound 01/03/20 #3 Right Buttock (Active)   Wound Image   3/6/2020  1:46 PM   Wound Pressure Stage  2 3/20/2020  1:23 PM   Dressing Status Clean;Dry; Intact 3/13/2020  2:59 PM   Dressing Changed Changed/New 3/13/2020  2:59 PM   Wound Cleansed Rinsed/Irrigated with saline 3/20/2020  1:23 PM   Wound Length (cm) 4.2 cm 3/20/2020  1:23 O'Clock 0 3/20/2020  1:23 PM   Undermining Maxium Distance (cm) 0 3/20/2020  1:23 PM   Wound Assessment Yellow 3/20/2020  1:23 PM   Drainage Amount Moderate 3/20/2020  1:23 PM   Drainage Description Yellow 3/20/2020  1:23 PM   Odor None 3/20/2020  1:23 PM   Margins Defined edges 3/20/2020  1:23 PM   [place]-wound Assessment Pink 3/20/2020  1:23 PM   Non-staged Wound Description Full thickness 3/20/2020  1:23 PM   Wound measurement (cm^2) 0 cm2 2/14/2020  2:19 PM   [place]%Wound Bed 0 3/20/2020  1:23 PM   Red%Wound Bed 0 3/20/2020  1:23 PM   Yellow%Wound Bed 100 3/20/2020  1:23 PM   Black%Wound Bed 0 3/20/2020  1:23 PM   Purple%Wound Bed 0 3/20/2020  1:23 PM   Other%Wound Bed 0 3/20/2020  1:23 PM   Number of days: 48       Wound 01/31/20 Pretibial Left;Proximal #7 left medial leg cluster  (Active)   Wound Image   3/6/2020  1:46 PM   Dressing Status Clean;Dry; Intact 3/13/2020  2:41 PM   Dressing Changed Changed/New 3/13/2020  2:41 PM   Wound Cleansed Rinsed/Irrigated with saline 3/20/2020  1:23 PM   Wound Length (cm) 0.5 cm 3/20/2020  1:23 PM   Wound Width (cm) 0.5 cm 3/20/2020  1:23 PM   Wound Depth (cm) 0.1 cm 3/20/2020  1:23 PM   Wound Surface Area (cm^2) 0.25 cm^2 3/20/2020  1:23 PM   Change in Wound Size % (l*w) 28.57 3/20/2020  1:23 PM   Wound Volume (cm^3) 0.02 cm^3 3/20/2020  1:23 PM   Wound Healing % 50 3/20/2020  1:23 PM   Distance Tunneling (cm) 0 cm 3/20/2020  1:23 PM   Tunneling Position ___ O'Clock 0 3/20/2020  1:23 PM   Undermining Starts ___ O'Clock 0 3/20/2020  1:23 PM   Undermining Ends___ O'Clock 0 3/20/2020  1:23 PM   Undermining Maxium Distance (cm) 0 3/20/2020  1:23 PM   Wound Assessment Pink;Yellow 3/20/2020  1:23 PM   Drainage Amount Moderate 3/20/2020  1:23 PM   Drainage Description Yellow 3/20/2020  1:23 PM   Odor None 3/20/2020  1:23 PM   Margins Defined edges 3/20/2020  1:23 PM   Jack[place]-wound Assessment Pink 3/20/2020  1:23 PM   Non-staged Wound Description Full thickness 3/20/2020  1:23 PM [place]%Wound Bed 50 3/20/2020  1:23 PM   Red%Wound Bed 0 3/20/2020  1:23 PM   Yellow%Wound Bed 50 3/20/2020  1:23 PM   Black%Wound Bed 0 3/20/2020  1:23 PM   Purple%Wound Bed 0 3/20/2020  1:23 PM   Other%Wound Bed 0 3/20/2020  1:23 PM   Number of days: 48       Wound 02/07/20 Right;Posterior #9 right posterior distal lower leg  (Active)   Wound Image   3/6/2020  1:46 PM   Dressing Status Clean;Dry; Intact 3/13/2020  2:41 PM   Dressing Changed Changed/New 3/13/2020  2:41 PM   Wound Cleansed Soap and water 3/20/2020  1:23 PM   Wound Length (cm) 0 cm 3/20/2020  1:23 PM   Wound Width (cm) 0 cm 3/20/2020  1:23 PM   Wound Depth (cm) 0 cm 3/20/2020  1:23 PM   Wound Surface Area (cm^2) 0 cm^2 3/20/2020  1:23 PM   Change in Wound Size % (l*w) 100 3/20/2020  1:23 PM   Wound Volume (cm^3) 0 cm^3 3/20/2020  1:23 PM   Wound Healing % 100 3/20/2020  1:23 PM   Distance Tunneling (cm) 0 cm 3/20/2020  1:23 PM   Tunneling Position ___ O'Clock 0 3/20/2020  1:23 PM   Undermining Starts ___ O'Clock 0 3/20/2020  1:23 PM   Undermining Ends___ O'Clock 0 3/20/2020  1:23 PM   Undermining Max[place] Distance (cm) 0 3/20/2020  1:23 PM   Wound Assessment Pink 3/20/2020  1:23 PM   Drainage Amount None 3/20/2020  1:23 PM   Drainage Description Yellow 3/13/2020  1:20 PM   Odor None 3/20/2020  1:23 PM   Margins Defined edges 3/20/2020  1:23 PM   [place]-wound Assessment Pink 3/20/2020  1:23 PM   Non-staged Wound Description Not applicable 6/45/9747  0:66 PM   [place]%Wound Bed 100 3/20/2020  1:23 PM   Red%Wound Bed 0 3/20/2020  1:23 PM   Yellow%Wound Bed 0 3/20/2020  1:23 PM   Black%Wound Bed 0 3/20/2020  1:23 PM   Purple%Wound Bed 0 3/20/2020  1:23 PM   Other%Wound Bed 0 3/20/2020  1:23 PM   Number of days: 41       Wound 02/14/20 Tibial Left;Proximal;Posterior #10 (Active)   Wound Image   3/6/2020  1:46 PM   Wound Venous 3/20/2020  1:23 PM   Dressing Status Clean;Dry; Intact 3/13/2020  2:41 PM   Dressing Changed Changed/New 3/13/2020  2:41 PM   Wound 11/19               Imaging:               Cultures:   Right great toe on 1/31/2020              Labs/ HbA1c: 5.8 in 11/19              Grafts:  Puraply #1 applied 2/28/2020                            Puraply #2 applied 3/6/2020                            Puraply #3 applied 3/13/2020              HBO: To be determined               Antibiotics:               Earlier Wound care treatments:              Authorizations:               Consults:                           [facility] care physician: Johnny Jaquez      Continuing wound care orders and information:              Residence:  Private              Continue home health care with: Kimberly DUTTON wound-care supplies will be provided by: [facility]               DME provider:              Compression with N/A              SXT loading:  N/A              HADOF Medications:              WVYMM cleansing:                           NL not scrub or use excessive force.                          Wash hands with soap and water before and after dressing changes.                           Prior to applying a clean dressing, cleanse wound with normal saline,                                wound cleanser, or mild soap and water.                           Ask the physician or nurse before getting the wound(s) wet in a shower              Daily Wound management:                          Keep weight off wounds and reposition every 2 hours.                          DCNAE standing for long periods of time.                          OFWIM wraps/stockings in AM and remove at bedtime.                          If swelling is present, elevate legs to the level of the heart or above for 30  minutes 4-5 times a day and/or when sitting.                                                  When taking antibiotics take entire prescription as ordered by physician do not stop taking until medicine is all gone.                                                                 Orders for this week (3/20/20):     Right great toe -- healed 2/14/2020     Left forearm-- healed 3/20/2020      Right thigh-- Cover open areas with kerramax then :Wrap with conform and tape.  Wrap with ace wrap.              Left lower legs--   Wash with mild soap and water. Lotrisone to entire leg.  Cover open areas with kerramax then :Wrap with conform and tape. Tubi E and wrap with ace wrap.              Buttock cluster- Wash with mild soap and water. Paint with betadine, cover with island border gauze. Change Daily. Left buttock proximal       WOUND VAC THERAPY: hold for 1 week starting 3/20/20    Left buttock apply santyl to wound bed and pack gently with 1/2\" plain packing, cover with calcium alginate and border gauze. Change daily and outer as needed if soiled                  Lymphedema pumps referred to [facility]-- 1/31/2020-- received                       Follow up with Dr Martha Sethi. In 1 week in the wound care center  Call [phone] for any questions or concerns.   Date__________   Time____________        Treatment Note      Written Patient Dismissal Instructions Given            Electronically signed by Sarah Mata MD on 3/20/2020 at 2:04 PM

## 2020-03-20 NOTE — PROGRESS NOTES
Nonselective enzymatic debridement performed with Santyl per physician order to wound(s) of the left buttock. Patient tolerated the procedure well.    Electronically signed by Jose Ramon Rios LPN on 4/63/6777 at 9:58 PM

## 2020-03-23 ENCOUNTER — HOSPITAL ENCOUNTER (OUTPATIENT)
Dept: INFUSION THERAPY | Age: 70
Discharge: HOME | End: 2020-03-23
Payer: MEDICARE

## 2020-03-23 LAB
ALBUMIN SERPL-MCNC: 2.4 GM/DL (ref 3.4–5)
ALP BLD-CCNC: 598 IU/L (ref 40–129)
ALT SERPL-CCNC: 62 U/L (ref 10–40)
ANION GAP SERPL CALCULATED.3IONS-SCNC: 11 MMOL/L (ref 4–16)
AST SERPL-CCNC: 86 IU/L (ref 15–37)
BASOPHILS ABSOLUTE: 0.1 K/CU MM
BASOPHILS RELATIVE PERCENT: 0.6 % (ref 0–1)
BILIRUB SERPL-MCNC: 1.3 MG/DL (ref 0–1)
BUN BLDV-MCNC: 10 MG/DL (ref 6–23)
CALCIUM SERPL-MCNC: 7.8 MG/DL (ref 8.3–10.6)
CHLORIDE BLD-SCNC: 95 MMOL/L (ref 99–110)
CO2: 29 MMOL/L (ref 21–32)
CREAT SERPL-MCNC: 0.7 MG/DL (ref 0.6–1.1)
DIFFERENTIAL TYPE: ABNORMAL
EOSINOPHILS ABSOLUTE: 0.2 K/CU MM
EOSINOPHILS RELATIVE PERCENT: 1.5 % (ref 0–3)
GFR AFRICAN AMERICAN: >60 ML/MIN/1.73M2
GFR AFRICAN AMERICAN: >60 ML/MIN/1.73M2
GFR NON-AFRICAN AMERICAN: >60 ML/MIN/1.73M2
GFR NON-AFRICAN AMERICAN: >60 ML/MIN/1.73M2
GLUCOSE BLD-MCNC: 179 MG/DL (ref 70–99)
GLUCOSE BLD-MCNC: 193 MG/DL (ref 70–99)
HCT VFR BLD CALC: 36.4 % (ref 37–47)
HEMOGLOBIN: 12.2 GM/DL (ref 12.5–16)
LYMPHOCYTES ABSOLUTE: 1 K/CU MM
LYMPHOCYTES RELATIVE PERCENT: 10.2 % (ref 24–44)
MCH RBC QN AUTO: 34.2 PG (ref 27–31)
MCHC RBC AUTO-ENTMCNC: 33.5 % (ref 32–36)
MCV RBC AUTO: 102 FL (ref 78–100)
MONOCYTES ABSOLUTE: 1.8 K/CU MM
MONOCYTES RELATIVE PERCENT: 17.8 % (ref 0–4)
PDW BLD-RTO: 17.6 % (ref 11.7–14.9)
PLATELET # BLD: 160 K/CU MM (ref 140–440)
PMV BLD AUTO: 9.8 FL (ref 7.5–11.1)
POC CALCIUM: 1.06 MMOL/L (ref 1.12–1.32)
POC CHLORIDE: 98 MMOL/L (ref 98–109)
POC CREATININE: 0.8 MG/DL (ref 0.6–1.1)
POTASSIUM SERPL-SCNC: 3.8 MMOL/L (ref 3.6–5.1)
POTASSIUM SERPL-SCNC: 4.1 MMOL/L (ref 3.5–5.1)
RBC # BLD: 3.57 M/CU MM (ref 4.2–5.4)
SEGMENTED NEUTROPHILS ABSOLUTE COUNT: 7 K/CU MM
SEGMENTED NEUTROPHILS RELATIVE PERCENT: 69.9 % (ref 36–66)
SODIUM BLD-SCNC: 135 MMOL/L (ref 135–145)
SODIUM BLD-SCNC: 138 MMOL/L (ref 136–145)
TOTAL PROTEIN: 4.5 GM/DL (ref 6.4–8.2)
WBC # BLD: ABNORMAL K/CU MM (ref 4–10.5)

## 2020-03-23 PROCEDURE — 85025 COMPLETE CBC W/AUTO DIFF WBC: CPT

## 2020-03-23 PROCEDURE — 80053 COMPREHEN METABOLIC PANEL: CPT

## 2020-03-23 PROCEDURE — 36415 COLL VENOUS BLD VENIPUNCTURE: CPT

## 2020-03-23 PROCEDURE — 36591 DRAW BLOOD OFF VENOUS DEVICE: CPT

## 2020-03-23 PROCEDURE — 96365 THER/PROPH/DIAG IV INF INIT: CPT

## 2020-03-23 PROCEDURE — 2580000003 HC RX 258: Performed by: INTERNAL MEDICINE

## 2020-03-23 PROCEDURE — 6360000002 HC RX W HCPCS

## 2020-03-23 PROCEDURE — 96402 CHEMO HORMON ANTINEOPL SQ/IM: CPT

## 2020-03-23 PROCEDURE — 6360000002 HC RX W HCPCS: Performed by: INTERNAL MEDICINE

## 2020-03-23 RX ORDER — HEPARIN SODIUM (PORCINE) LOCK FLUSH IV SOLN 100 UNIT/ML 100 UNIT/ML
SOLUTION INTRAVENOUS
Status: DISPENSED
Start: 2020-03-23 | End: 2020-03-23

## 2020-03-23 RX ORDER — LAMOTRIGINE 25 MG/1
500 TABLET ORAL ONCE
Status: DISCONTINUED | OUTPATIENT
Start: 2020-03-23 | End: 2020-03-24 | Stop reason: HOSPADM

## 2020-03-27 ENCOUNTER — HOSPITAL ENCOUNTER (OUTPATIENT)
Dept: WOUND CARE | Age: 70
Discharge: HOME | End: 2020-03-27
Payer: MEDICARE

## 2020-03-27 VITALS
TEMPERATURE: 96.2 F | SYSTOLIC BLOOD PRESSURE: 131 MMHG | DIASTOLIC BLOOD PRESSURE: 62 MMHG | HEART RATE: 105 BPM | RESPIRATION RATE: 16 BRPM

## 2020-03-27 PROCEDURE — 11042 DBRDMT SUBQ TIS 1ST 20SQCM/<: CPT

## 2020-03-27 NOTE — PLAN OF CARE
Problem: Wound:  Goal: Will show signs of wound healing; wound closure and no evidence of infection  Description: Will show signs of wound healing; wound closure and no evidence of infection  Outcome: Ongoing  Intervention: Assess ankle, calf, or foot circumference blilaterally  Note: See flowsheet. Intervention: Assess pain status  Note: See flowsheet. Intervention: Assess wound size, appearance and drainage  Note: See flowsheet. Intervention: Assess pedal pulses bilaterally if patient has a foot or leg ulcer  Note: See flowsheet. Intervention: Doppler if unable to palpate pedal pulse  Note: See flowsheet.

## 2020-03-27 NOTE — PROGRESS NOTES
Nonselective enzymatic debridement performed with Santyl per physician order to wound(s) of the left proximal buttock. Patient tolerated the procedure well.    Electronically signed by Lida Bourne LPN on 2/45/8035 at 2:93 PM

## 2020-03-27 NOTE — PROGRESS NOTES
involving back and right buttock, stage 2 ([address] 1/3/2020    WD-Pressure injury of left buttock, stage 3 ([address] 1/3/2020    WD-Venous stasis ulcer of right lower leg with edema of right lower leg ([address] 1/10/2020    Wears glasses        PAST SURGICAL HISTORY    Past Surgical History:   Procedure Laterality Date    ABDOMEN SURGERY      \"in 1974 did exploratory surgery- found I had cyst on right ovary that ruptured and said I was bleeding internally\"/ then back age 2 ( 6) did exploratory surgery for hilario colon \"    APPENDECTOMY  age 2   Dheeraj Schmidt BONE BIOPSY N/A 03/14/2019    IR    BREAST SURGERY Left 12/2014    Masectomy    COLONOSCOPY  03/2006    Normal exam    COLONOSCOPY  10/04/2017    mild sigmoid divertics    HEMIARTHROPLASTY HIP Left 9/28/2019    HIP HEMIARTHROPLASTY performed by Crystal Tavarez MD at [facility] HEMIARTHROPLASTY HIP Right 11/5/2019    HIP HEMIARTHROPLASTY performed by Crystal Tavarez MD at [address] [place] [address] 7/2016( inserted in 2015)       FAMILY HISTORY    Family History   Problem Relation Age of Onset    High Blood Pressure Mother     Arthritis Mother     Diabetes Father     Asthma Father         COPD    Heart Disease Father        SOCIAL HISTORY    Social History     Tobacco Use    Smoking status: Never Smoker    Smokeless tobacco: Never Used   Substance Use Topics    Alcohol use: Not Currently     Comment: per pt on 3/13/2019\"use to drink average one glass per week- none recently\"    Drug use: No       ALLERGIES    Allergies   Allergen Reactions    Sulfa Antibiotics Other (See Comments)     As child unsure of the reaction       MEDICATIONS    Current Outpatient Medications on File Prior to Encounter   Medication Sig Dispense Refill    atenolol (TENORMIN) 25 MG tablet Take 25 mg by mouth daily      morphine (MSIR) 15 MG tablet Take 15 mg by mouth 2 times daily.       furosemide (LASIX) 20 MG tablet Take 1 tablet by mouth daily As needed for swelling 60 tablet 3    potassium chloride (KLOR-CON M) 20 MEQ TBCR extended release tablet Take 20 mEq by mouth 2 times daily      Alpelisib, 200 MG Daily Dose, (PIQRAY, 200 MG DAILY DOSE,) 200 MG TBPK Take 150 mg by mouth daily (with breakfast)       OLANZapine (ZYPREXA) 2.5 MG tablet Take 2.5 mg by mouth nightly Takes during Chemo      folic acid (FOLVITE) 1 MG tablet Take 800 mcg by mouth daily      dexamethasone (DECADRON) 4 MG tablet Take 0.5 mg by mouth every other day       Mastectomy Bra MISC by Does not apply route 4 each 0    Breast Prosthesis MISC by Does not apply route 1 each 1    ondansetron (ZOFRAN) 8 MG tablet Take 8 mg by mouth every 8 hours as needed for Nausea or Vomiting      vitamin C (ASCORBIC ACID) 500 MG tablet Take 500 mg by mouth daily      omeprazole (PRILOSEC) 20 MG delayed release capsule Take 20 mg by mouth daily       Multiple Vitamin (MULTI VITAMIN DAILY PO) Take by mouth      vitamin E 400 UNIT capsule Take 400 Units by mouth daily      aspirin 81 MG tablet Take 81 mg by mouth every other day       Loratadine 10 MG CAPS Take by mouth daily       HYDROcodone-acetaminophen (NORCO) 7.5-325 MG per tablet Take 1 tablet by mouth every 6 hours as needed for Pain. No current facility-administered medications on file prior to encounter. REVIEW OF SYSTEMS    Pertinent items are noted in HPI. Constitutional: Negative for systemic symptoms including fever, chills and malaise. Objective:      /62   Pulse 105   Temp 96.2 °F (35.7 °C) (Temporal)   Resp 16   LMP 01/13/2005     PHYSICAL EXAM      General: The patient is in no acute distress. Mental status:  Patient is appropriate, is  oriented to place and plan of care.   Dermatologic exam: Visual inspection of the periwound reveals the skin to be moist  Wound exam: see wound description below in procedure note      Assessment:     Problem List Items Addressed This Visit     WD-Stage IV pressure ulcer of left buttock ([address] - Primary        Procedure Note    Indications:  Based on my examination of this patient's wound(s) today, sharp excision into necrotic subcutaneous tissue is required to promote healing and evaluate the extent of previous healing. Performed by: Harpal Linda MD    Consent obtained: Yes    Time out taken:  Yes    Pain Control: Anesthetic  Anesthetic: 4% Lidocaine Liquid Topical     Debridement:Excisional Debridement    Using curette the wound(s) was/were sharply debrided down through and including the removal of epidermis, dermis and subcutaneous tissue. Devitalized Tissue Debrided:  fibrin, biofilm, slough and exudate    Pre Debridement Measurements:  Are located in the Wound Documentation Flow Sheet    All active wounds listed below with today's date are evaluated  Wound(s)    debrided this date include # : 1     Post  Debridement Measurements:  Negative Pressure Wound Therapy (Active)   Unit Type kci 3/13/2020  2:59 PM   Dressing Type Black foam 3/13/2020  2:59 PM   Number of pieces used 1 3/13/2020  2:59 PM   Cycle Continuous 3/13/2020  2:59 PM   Target Pressure (mmHg) 75 3/13/2020  2:59 PM   Intensity 5 3/13/2020  2:59 PM   Canister changed? Yes 3/13/2020  2:59 PM   Dressing Status Clean;Dry; Intact 3/13/2020  2:59 PM   Dressing Changed Changed/New 3/13/2020  2:59 PM   Drainage Amount Moderate 3/13/2020  2:59 PM   Drainage Description Serosanguinous 3/13/2020  2:59 PM   Dressing Change Due 03/09/20 3/6/2020  3:41 PM   Wound Assessment Red 3/13/2020  2:59 PM   Jackelin-wound Assessment Intact 3/13/2020  2:59 PM   Odor Mild 3/13/2020  2:59 PM   Number of days: 63       Wound 11/04/19 Buttocks Left;Upper (Active)   Number of days: 144       Wound 01/03/20 #1 Left proximal Buttock (Active)   Wound Image   3/27/2020  1:22 PM   Wound Pressure Stage  2 3/27/2020  1:22 PM   Dressing Status Clean;Dry; Intact 3/27/2020  3:21 PM   Dressing Changed Changed/New 3/27/2020  3:21 PM saline 3/27/2020  1:22 PM   Wound Length (cm) 0 cm 3/27/2020  1:22 PM   Wound Width (cm) 0 cm 3/27/2020  1:22 PM   Wound Depth (cm) 0 cm 3/27/2020  1:22 PM   Wound Surface Area (cm^2) 0 cm^2 3/27/2020  1:22 PM   Change in Wound Size % (l*w) 100 3/27/2020  1:22 PM   Wound Volume (cm^3) 0 cm^3 3/27/2020  1:22 PM   Wound Healing % 100 3/27/2020  1:22 PM   Distance Tunneling (cm) 0 cm 3/27/2020  1:22 PM   Tunneling Position ___ O'Clock 0 3/27/2020  1:22 PM   Undermining Starts ___ O'Clock 0 3/27/2020  1:22 PM   Undermining Ends___ O'Clock 0 3/27/2020  1:22 PM   Undermining Maxium Distance (cm) 0 3/27/2020  1:22 PM   Wound Assessment Pink 3/27/2020  1:22 PM   Drainage Amount None 3/27/2020  1:22 PM   Drainage Description Serosanguinous 3/20/2020  1:23 PM   Odor None 3/27/2020  1:22 PM   Margins Attached edges 3/27/2020  1:22 PM   Jackelin-wound Assessment Pink 3/27/2020  1:22 PM   Non-staged Wound Description Full thickness 3/27/2020  1:22 PM   [place]%Wound Bed 100 3/27/2020  1:22 PM   Red%Wound Bed 0 3/27/2020  1:22 PM   Yellow%Wound Bed 0 3/27/2020  1:22 PM   Black%Wound Bed 0 3/27/2020  1:22 PM   Purple%Wound Bed 0 3/27/2020  1:22 PM   Other%Wound Bed 0 3/27/2020  1:22 PM   Number of days: 42       Percent of Wound(s) Debrided: approximately 100%    Total  Area  Debrided:  0.3 sq cm     Bleeding:  Minimal    Hemostasis Achieved:  by pressure    Procedural Pain:  0  / 10     Post Procedural Pain:  0 / 10     Response to treatment:  Well tolerated by patient. Status of wound progress and description from last visit:   Improved today- better granulation, but very wet. Her drainage is due to underlying pathology- unfortunately, this is preventing her healing. She is also not able to get off the wound- this is not allowing the healing either.   .      Plan:       Discharge Instructions       PHYSICIAN ORDERS AND DISCHARGE INSTRUCTIONS     NOTE: Upon discharge from the [address], you will receive a patient experience survey. We would be grateful if you would take the time to fill this survey out.     Wound care order history:                 TOÑO's   N/A--buttock wound              Vascular studies: done in 11/19               Imaging:               Cultures:   Right great toe on 1/31/2020              Labs/ HbA1c: 5.8 in 11/19              Grafts:  Puraply #1 applied 2/28/2020                            Puraply #2 applied 3/6/2020                            Puraply #3 applied 3/13/2020              HBO: To be determined               Antibiotics:               Earlier Wound care treatments:              Authorizations:               Consults:                           Primary care physician: Vivian Mendoza      Continuing wound care orders and information:              Residence:  [facility]              Continue home health care with: [facility]               BYSS wound-care supplies will be provided by: [facility]               DME provider:              Compression with N/A              FPY loading:  N/A              OJOUS Medications:              YOLWR cleansing:                           DA not scrub or use excessive force.                          Wash hands with soap and water before and after dressing changes.                           Prior to applying a clean dressing, cleanse wound with normal saline,                                wound cleanser, or mild soap and water.                           Ask the physician or nurse before getting the wound(s) wet in a shower              Daily Wound management:                          Keep weight off wounds and reposition every 2 hours.                          TJDVZ standing for long periods of time.                          HAFIP wraps/stockings in AM and remove at bedtime.                          If swelling is present, elevate legs to the level of the heart or above for 30  minutes 4-5 times a day and/or when sitting.                                                  When taking packing, Ca+ alg, kerramax care border )  Wound 01/03/20 #3 Right Buttock-Dressing/Treatment: (painted with betadine pt refused border )    Written Patient Dismissal Instructions Given            Electronically signed by Jania Armstrong MD on 3/27/2020 at 3:59 PM

## 2020-04-06 ENCOUNTER — HOSPITAL ENCOUNTER (OUTPATIENT)
Age: 70
Setting detail: SPECIMEN
Discharge: HOME | End: 2020-04-06
Payer: MEDICARE

## 2020-04-06 LAB
BASOPHILS ABSOLUTE: 0.1 K/CU MM
BASOPHILS RELATIVE PERCENT: 0.7 % (ref 0–1)
DIFFERENTIAL TYPE: ABNORMAL
EOSINOPHILS ABSOLUTE: 0 K/CU MM
EOSINOPHILS RELATIVE PERCENT: 0.1 % (ref 0–3)
HCT VFR BLD CALC: 38.3 % (ref 37–47)
HEMOGLOBIN: 12.6 GM/DL (ref 12.5–16)
IMMATURE NEUTROPHIL %: 9.2 % (ref 0–0.43)
LYMPHOCYTES ABSOLUTE: 1.3 K/CU MM
LYMPHOCYTES RELATIVE PERCENT: 8.5 % (ref 24–44)
MCH RBC QN AUTO: 33.5 PG (ref 27–31)
MCHC RBC AUTO-ENTMCNC: 32.9 % (ref 32–36)
MCV RBC AUTO: 101.9 FL (ref 78–100)
MONOCYTES ABSOLUTE: 2.8 K/CU MM
MONOCYTES RELATIVE PERCENT: 17.7 % (ref 0–4)
PDW BLD-RTO: 17.4 % (ref 11.7–14.9)
PLATELET # BLD: 147 K/CU MM (ref 140–440)
PMV BLD AUTO: 11.4 FL (ref 7.5–11.1)
RBC # BLD: 3.76 M/CU MM (ref 4.2–5.4)
SEGMENTED NEUTROPHILS ABSOLUTE COUNT: 10 K/CU MM
SEGMENTED NEUTROPHILS RELATIVE PERCENT: 63.8 % (ref 36–66)
TOTAL IMMATURE NEUTOROPHIL: 1.43 K/CU MM
WBC # BLD: 15.6 K/CU MM (ref 4–10.5)

## 2020-04-06 PROCEDURE — 85025 COMPLETE CBC W/AUTO DIFF WBC: CPT

## 2020-04-10 ENCOUNTER — HOSPITAL ENCOUNTER (OUTPATIENT)
Dept: WOUND CARE | Age: 70
Discharge: HOME | End: 2020-04-10
Payer: MEDICARE

## 2020-04-10 VITALS
HEART RATE: 96 BPM | SYSTOLIC BLOOD PRESSURE: 110 MMHG | RESPIRATION RATE: 18 BRPM | DIASTOLIC BLOOD PRESSURE: 62 MMHG | TEMPERATURE: 98.7 F

## 2020-04-10 PROCEDURE — 87077 CULTURE AEROBIC IDENTIFY: CPT

## 2020-04-10 PROCEDURE — 87073 CULTURE BACTERIA ANAEROBIC: CPT

## 2020-04-10 PROCEDURE — 99213 OFFICE O/P EST LOW 20 MIN: CPT

## 2020-04-10 PROCEDURE — 11042 DBRDMT SUBQ TIS 1ST 20SQCM/<: CPT | Performed by: INTERNAL MEDICINE

## 2020-04-10 PROCEDURE — 87186 SC STD MICRODIL/AGAR DIL: CPT

## 2020-04-10 PROCEDURE — 87071 CULTURE AEROBIC QUANT OTHER: CPT

## 2020-04-10 ASSESSMENT — PAIN SCALES - GENERAL: PAINLEVEL_OUTOF10: 0

## 2020-04-10 NOTE — PLAN OF CARE
Problem: Wound:  Goal: Will show signs of wound healing; wound closure and no evidence of infection  Description: Will show signs of wound healing; wound closure and no evidence of infection  Outcome: Ongoing     Problem: Wound:  Intervention: Assess ankle, calf, or foot circumference blilaterally  Note: See flowsheet   Intervention: Assess pain status  Note: See flowsheet   Intervention: Assess wound size, appearance and drainage  Note: See flowsheet   Intervention: Assess pedal pulses bilaterally if patient has a foot or leg ulcer  Note: See flowsheet   Intervention: Doppler if unable to palpate pedal pulse  Note: See flowsheet

## 2020-04-10 NOTE — PROGRESS NOTES
Wound Care Center Progress Note With Procedure    Ranulfo Alvarez  AGE: 72 y.o. GENDER: female  : 1950  EPISODE DATE:  4/10/2020     Subjective:     Chief Complaint   Patient presents with    Wound Check     bilateral legs          HISTORY of PRESENT ILLNESS      Ranulfo Alvarez is a 72 y.o. female who presents today for wound evaluation of Chronic venous and pressure ulcer(s) of the legs (lypmhedema/venous) and buttock (pressure). .  The ulcer is of marked severity. The underlying cause of the wound is pressure on the buttock and lymphedema on the legs. She is in for f/u- has improvement in the buttock (admits that she is still not off the wound! !!!) and has worsening of the right leg today. She has rash and increased redness and wound on the right leg. I take a culture today.     Wound Pain Timing/Severity: mild  Quality of pain: tender  Severity of pain:  1 / 10   Modifying Factors: edema, venous stasis, chronic pressure, decreased mobility and obesity  Associated Signs/Symptoms: drainage        PAST MEDICAL HISTORY        Diagnosis Date    Anemia     Arthritis     left hip    Cancer ([address] 2014    breast cancer( left)- tx with surgery, chemo and radiation- follows with Dr Daisha Morrison and Dr Allan Dominguez Diverticulosis     Edema     Esophagitis     GERD (gastroesophageal reflux disease)     Glaucoma     \"they say I have in the left eye but no treatment yet\"    Hip fracture ([address]     History of blood transfusion     \"in  after surgery    History of kidney stones     \"with the testing shows I have small kidney stones both kidneys but no trouble with them\"    Hypertension     Lymphedema     Metastatic breast cancer ([address]     Osteoarthritis     Status post bilateral total hip replacement 2020    Tachycardia     WD-Lymphedema of both lower extremities 2020    WD-Pressure injury of contiguous region involving back and right buttock, stage 2 ([address] 1/3/2020    WD-Pressure Changed/New 3/27/2020  3:21 PM   Wound Cleansed Soap and water 4/10/2020  1:24 PM   Wound Length (cm) 3.8 cm 4/10/2020  1:24 PM   Wound Width (cm) 5 cm 4/10/2020  1:24 PM   Wound Depth (cm) 0.1 cm 4/10/2020  1:24 PM   Wound Surface Area (cm^2) 19 cm^2 4/10/2020  1:24 PM   Change in Wound Size % (l*w) -130.3 4/10/2020  1:24 PM   Wound Volume (cm^3) 1.9 cm^3 4/10/2020  1:24 PM   Wound Healing % -132 4/10/2020  1:24 PM   Distance Tunneling (cm) 0 cm 4/10/2020  1:24 PM   Tunneling Position ___ O'Clock 0 4/10/2020  1:24 PM   Undermining Starts ___ O'Clock 0 4/10/2020  1:24 PM   Undermining Ends___ O'Clock 0 4/10/2020  1:24 PM   Undermining Maxium Distance (cm) 0 4/10/2020  1:24 PM   Wound Assessment Pink;Yellow 4/10/2020  1:24 PM   Drainage Amount Large 4/10/2020  1:24 PM   Drainage Description Yellow 4/10/2020  1:24 PM   Odor None 4/10/2020  1:24 PM   Margins Defined edges 4/10/2020  1:24 PM   Jackelin-wound Assessment Maceration 4/10/2020  1:24 PM   Non-staged Wound Description Full thickness 4/10/2020  1:24 PM   [place]%Wound Bed 50 4/10/2020  1:24 PM   Red%Wound Bed 0 4/10/2020  1:24 PM   Yellow%Wound Bed 50 4/10/2020  1:24 PM   Black%Wound Bed 0 4/10/2020  1:24 PM   Purple%Wound Bed 0 4/10/2020  1:24 PM   Other%Wound Bed 0 4/10/2020  1:24 PM   Number of days: 98       Wound 01/31/20 #6 right posterior ankle  (Active)   Wound Image   3/27/2020  1:22 PM   Dressing Status Clean;Dry; Intact 3/27/2020  3:21 PM   Dressing Changed Changed/New 3/27/2020  3:21 PM   Wound Cleansed Rinsed/Irrigated with saline 4/10/2020  1:24 PM   Wound Length (cm) 0.2 cm 4/10/2020  1:24 PM   Wound Width (cm) 0.2 cm 4/10/2020  1:24 PM   Wound Depth (cm) 0.1 cm 4/10/2020  1:24 PM   Wound Surface Area (cm^2) 0.04 cm^2 4/10/2020  1:24 PM   Change in Wound Size % (l*w) 88.89 4/10/2020  1:24 PM   Wound Volume (cm^3) 0 cm^3 4/10/2020  1:24 PM   Wound Healing % 100 4/10/2020  1:24 PM   Distance Tunneling (cm) 0 cm 4/10/2020  1:24 PM   Tunneling Position ___

## 2020-04-13 LAB
CULTURE: ABNORMAL
Lab: ABNORMAL
SPECIMEN: ABNORMAL

## 2020-04-20 ENCOUNTER — HOSPITAL ENCOUNTER (OUTPATIENT)
Dept: INFUSION THERAPY | Age: 70
Discharge: HOME | End: 2020-04-20
Payer: MEDICARE

## 2020-04-20 LAB
ALBUMIN SERPL-MCNC: 2.2 GM/DL (ref 3.4–5)
ALP BLD-CCNC: 1402 IU/L (ref 40–129)
ALT SERPL-CCNC: 126 U/L (ref 10–40)
ANION GAP SERPL CALCULATED.3IONS-SCNC: 11 MMOL/L (ref 4–16)
AST SERPL-CCNC: 131 IU/L (ref 15–37)
BASOPHILS ABSOLUTE: 0.1 K/CU MM
BASOPHILS RELATIVE PERCENT: 0.4 % (ref 0–1)
BILIRUB SERPL-MCNC: 3.6 MG/DL (ref 0–1)
BUN BLDV-MCNC: 28 MG/DL (ref 6–23)
CALCIUM SERPL-MCNC: 8.2 MG/DL (ref 8.3–10.6)
CHLORIDE BLD-SCNC: 92 MMOL/L (ref 99–110)
CO2: 23 MMOL/L (ref 21–32)
CREAT SERPL-MCNC: 1.3 MG/DL (ref 0.6–1.1)
DIFFERENTIAL TYPE: ABNORMAL
EOSINOPHILS ABSOLUTE: 0 K/CU MM
EOSINOPHILS RELATIVE PERCENT: 0.1 % (ref 0–3)
GFR AFRICAN AMERICAN: 49 ML/MIN/1.73M2
GFR NON-AFRICAN AMERICAN: 41 ML/MIN/1.73M2
GLUCOSE BLD-MCNC: 199 MG/DL (ref 70–99)
HCT VFR BLD CALC: 38.4 % (ref 37–47)
HEMOGLOBIN: 13.4 GM/DL (ref 12.5–16)
LYMPHOCYTES ABSOLUTE: 0.8 K/CU MM
LYMPHOCYTES RELATIVE PERCENT: 5.9 % (ref 24–44)
MCH RBC QN AUTO: 33.8 PG (ref 27–31)
MCHC RBC AUTO-ENTMCNC: 34.9 % (ref 32–36)
MCV RBC AUTO: 96.7 FL (ref 78–100)
MONOCYTES ABSOLUTE: 1.9 K/CU MM
MONOCYTES RELATIVE PERCENT: 14.2 % (ref 0–4)
PDW BLD-RTO: 20.6 % (ref 11.7–14.9)
PLATELET # BLD: 156 K/CU MM (ref 140–440)
PMV BLD AUTO: 11.2 FL (ref 7.5–11.1)
POTASSIUM SERPL-SCNC: 6.6 MMOL/L (ref 3.5–5.1)
RBC # BLD: 3.97 M/CU MM (ref 4.2–5.4)
SEGMENTED NEUTROPHILS ABSOLUTE COUNT: 10.7 K/CU MM
SEGMENTED NEUTROPHILS RELATIVE PERCENT: 79.4 % (ref 36–66)
SODIUM BLD-SCNC: 126 MMOL/L (ref 135–145)
TOTAL PROTEIN: 4.3 GM/DL (ref 6.4–8.2)
WBC # BLD: 13.5 K/CU MM (ref 4–10.5)

## 2020-04-20 PROCEDURE — 80053 COMPREHEN METABOLIC PANEL: CPT

## 2020-04-20 PROCEDURE — 36415 COLL VENOUS BLD VENIPUNCTURE: CPT

## 2020-04-20 PROCEDURE — 6360000002 HC RX W HCPCS

## 2020-04-20 PROCEDURE — 85025 COMPLETE CBC W/AUTO DIFF WBC: CPT

## 2020-04-20 PROCEDURE — 36591 DRAW BLOOD OFF VENOUS DEVICE: CPT

## 2020-04-20 PROCEDURE — 96402 CHEMO HORMON ANTINEOPL SQ/IM: CPT

## 2020-04-20 PROCEDURE — 6360000002 HC RX W HCPCS: Performed by: INTERNAL MEDICINE

## 2020-04-20 RX ORDER — HEPARIN SODIUM (PORCINE) LOCK FLUSH IV SOLN 100 UNIT/ML 100 UNIT/ML
SOLUTION INTRAVENOUS
Status: DISCONTINUED
Start: 2020-04-20 | End: 2020-04-21 | Stop reason: HOSPADM

## 2020-04-20 RX ORDER — LAMOTRIGINE 25 MG/1
500 TABLET ORAL ONCE
Status: DISCONTINUED | OUTPATIENT
Start: 2020-04-20 | End: 2020-04-21 | Stop reason: HOSPADM

## 2020-04-20 RX ORDER — LAMOTRIGINE 25 MG/1
250 TABLET ORAL ONCE
Status: DISCONTINUED | OUTPATIENT
Start: 2020-04-20 | End: 2020-04-20 | Stop reason: CLARIF

## 2020-04-21 ENCOUNTER — HOSPITAL ENCOUNTER (OUTPATIENT)
Dept: INFUSION THERAPY | Age: 70
Discharge: HOME | End: 2020-04-21
Payer: MEDICARE

## 2020-04-21 LAB
ALBUMIN SERPL-MCNC: 2.2 GM/DL (ref 3.4–5)
ALP BLD-CCNC: 1392 IU/L (ref 40–128)
ALT SERPL-CCNC: 124 U/L (ref 10–40)
ANION GAP SERPL CALCULATED.3IONS-SCNC: 15 MMOL/L (ref 4–16)
AST SERPL-CCNC: 150 IU/L (ref 15–37)
BILIRUB SERPL-MCNC: 3.4 MG/DL (ref 0–1)
BUN BLDV-MCNC: 33 MG/DL (ref 6–23)
CALCIUM SERPL-MCNC: 8.3 MG/DL (ref 8.3–10.6)
CHLORIDE BLD-SCNC: 91 MMOL/L (ref 99–110)
CO2: 23 MMOL/L (ref 21–32)
CREAT SERPL-MCNC: 1.6 MG/DL (ref 0.6–1.1)
GFR AFRICAN AMERICAN: 34 ML/MIN/1.73M2
GFR AFRICAN AMERICAN: 39 ML/MIN/1.73M2
GFR NON-AFRICAN AMERICAN: 28 ML/MIN/1.73M2
GFR NON-AFRICAN AMERICAN: 32 ML/MIN/1.73M2
GLUCOSE BLD-MCNC: 123 MG/DL (ref 70–99)
GLUCOSE BLD-MCNC: 132 MG/DL (ref 70–99)
POC CALCIUM: 1.02 MMOL/L (ref 1.12–1.32)
POC CHLORIDE: 92 MMOL/L (ref 98–109)
POC CREATININE: 1.8 MG/DL (ref 0.6–1.1)
POTASSIUM SERPL-SCNC: 4.5 MMOL/L (ref 3.6–5.1)
POTASSIUM SERPL-SCNC: 4.8 MMOL/L (ref 3.5–5.1)
SODIUM BLD-SCNC: 127 MMOL/L (ref 136–145)
SODIUM BLD-SCNC: 129 MMOL/L (ref 135–145)
TOTAL PROTEIN: 4.1 GM/DL (ref 6.4–8.2)

## 2020-04-21 PROCEDURE — 80053 COMPREHEN METABOLIC PANEL: CPT

## 2020-04-21 PROCEDURE — 36415 COLL VENOUS BLD VENIPUNCTURE: CPT

## 2020-04-22 ENCOUNTER — HOSPITAL ENCOUNTER (OUTPATIENT)
Dept: INFUSION THERAPY | Age: 70
Discharge: HOME | End: 2020-04-22
Payer: MEDICARE

## 2020-04-22 PROCEDURE — 87086 URINE CULTURE/COLONY COUNT: CPT

## 2020-04-22 PROCEDURE — 87186 SC STD MICRODIL/AGAR DIL: CPT

## 2020-04-22 PROCEDURE — 87077 CULTURE AEROBIC IDENTIFY: CPT

## 2020-04-24 LAB
CULTURE: ABNORMAL
Lab: ABNORMAL
SPECIMEN: ABNORMAL
TOTAL COLONY COUNT: ABNORMAL

## 2020-04-29 ENCOUNTER — TELEPHONE (OUTPATIENT)
Dept: WOUND CARE | Age: 70
End: 2020-04-29

## 2023-01-17 NOTE — DISCHARGE SUMMARY
Order placed for the next year.    Electronically signed by:  Kasi Boone M.D.  1/17/2023     [facility] well. Discharge instructions explained written copy given. Understanding verbalized. Discharged home with spouse. Down to private auto per self.

## (undated) DEVICE — FAN SPRAY KIT: Brand: PULSAVAC®

## (undated) DEVICE — GLOVE ORANGE PI 8   MSG9080

## (undated) DEVICE — HEWSON SUTURE RETRIEVER: Brand: HEWSON SUTURE RETRIEVER

## (undated) DEVICE — SOLUTION IV IRRIG WATER 1000ML POUR BRL 2F7114

## (undated) DEVICE — PACK PROCEDURE SURG TOT HIP LF

## (undated) DEVICE — GOWN,SIRUS,POLYRNF,BRTHSLV,XLN/XL,20/CS: Brand: MEDLINE

## (undated) DEVICE — LINER SUCT CANSTR 1500CC SEMI RIG W/ POR HYDROPHOBIC SHUT

## (undated) DEVICE — 3M™ IOBAN™ 2 ANTIMICROBIAL INCISE DRAPE 6650EZ: Brand: IOBAN™ 2

## (undated) DEVICE — PILLOW POS W15XH6XL22IN RASPBERRY FOAM ABD W/ STRP DISP FOR

## (undated) DEVICE — OSCILLATOR BLADE, 19.5 X 71 X 0.8 MM (.031 IN.): Brand: CONMED

## (undated) DEVICE — DUAL CUT SAGITTAL BLADE

## (undated) DEVICE — GLOVE SURG SZ 65 L12IN FNGR THK87MIL WHT LTX FREE

## (undated) DEVICE — Device: Brand: POWER-FLO®

## (undated) DEVICE — YANKAUER,FLEXIBLE HANDLE,REGLR CAPACITY: Brand: MEDLINE INDUSTRIES, INC.

## (undated) DEVICE — CHLORAPREP 26ML ORANGE

## (undated) DEVICE — GLOVE SURG SZ 65 THK91MIL LTX FREE SYN POLYISOPRENE

## (undated) DEVICE — HOOD, PEEL-AWAY: Brand: FLYTE

## (undated) DEVICE — RESERVOIR,SUCTION,100CC,SILICONE: Brand: MEDLINE

## (undated) DEVICE — 3M™ MICROFOAM™ SURGICAL TAPE 4 ROLLS/CARTON 6 CARTONS/CASE 1528-3: Brand: 3M™ MICROFOAM™

## (undated) DEVICE — DRAIN SURG 19FR 100% SIL RADPQ RND CHN FULL FLUT

## (undated) DEVICE — HIGH CAPACITY 12" INTRAMEDULLARY TIP: Brand: PULSAVAC®

## (undated) DEVICE — DRAPE SHEET ULTRAGARD: Brand: MEDLINE

## (undated) DEVICE — ANESTHESIA CIRCUIT ADULT-LF: Brand: MEDLINE INDUSTRIES, INC.

## (undated) DEVICE — Z DISCONTINUED USE 2744636  DRESSING AQUACEL 14 IN ALG W3.5XL14IN POLYUR FLM CVR W/ HYDRCOLL

## (undated) DEVICE — GAUZE,SPONGE,4"X4",16PLY,XRAY,STRL,LF: Brand: MEDLINE

## (undated) DEVICE — BIT DRL L110MM DIA2MM QUIK CONN W/O STP N RADLUC REUSE

## (undated) DEVICE — SUTURE FIBERWIRE 2 L97CM NONABSORBABLE BLU L36.6MM 1/2 CIR AR7206

## (undated) DEVICE — BANDAGE,SELF ADHRNT,COFLEX,4"X5YD,STRL: Brand: COLABEL

## (undated) DEVICE — TOWEL,OR,DSP,ST,BLUE,STD,6/PK,12PK/CS: Brand: MEDLINE

## (undated) DEVICE — SUTURE ABSORBABLE BRAIDED 2-0 CT-1 27 IN UD VICRYL J259H

## (undated) DEVICE — MAT FLOOR ULTRA ABS 28X48IN

## (undated) DEVICE — SOLUTION IV 1000ML 0.9% SOD CHL FOR IRRIG PLAS CONT

## (undated) DEVICE — SUTURE PDS II SZ 1 L96IN ABSRB VLT TP-1 L65MM 1/2 CIR Z880G

## (undated) DEVICE — SKIN AFFIX SURG ADHESIVE 72/CS 0.55ML: Brand: MEDLINE

## (undated) DEVICE — KIT EVAC 0.13IN RECT TB DIA10FR 400CC PVC 3 SPR Y CONN DRN

## (undated) DEVICE — MARKER SURG SKIN UTIL REGULAR/FINE 2 TIP W/ RUL AND 9 LBL

## (undated) DEVICE — Device

## (undated) DEVICE — DRESSING TRNSPAR W2XL2.75IN FLM SHT SEMIPERMEABLE WIND

## (undated) DEVICE — DRAIN SURG 15FR SIL RND CHN W/ TRCR FULL FLUT DBL WRP TRAD

## (undated) DEVICE — COVER,TABLE,44X90,STERILE: Brand: MEDLINE

## (undated) DEVICE — DUAL SPIKE Y-CONNECTOR SET, PACKAGED: Brand: PULSAVAC®

## (undated) DEVICE — HIGH CAPACITY FAN SPRAY TIP WITH SHIELD: Brand: PULSAVAC®

## (undated) DEVICE — DRAIN SURG 10FR L1/8IN DIA3.2MM SIL CHN RND FULL FLUT TRCR

## (undated) DEVICE — GLOVE ORANGE PI 7   MSG9070

## (undated) DEVICE — Z INACTIVE PER PHARM Z INACTIVE USE 2530107 SOLUTION ANTISEP 70% ISOPROPYL RUBBING ALC 16 OZ PLAS BTL

## (undated) DEVICE — 3M™ IOBAN™ 2 ANTIMICROBIAL INCISE DRAPE 6648EZ: Brand: IOBAN™ 2

## (undated) DEVICE — ELECTRODE ES AD CRDLSS PT RET REM POLYHESIVE

## (undated) DEVICE — GOWN,ECLIPSE,POLYRNF,BRTHSLV,XL,30/CS: Brand: MEDLINE